# Patient Record
Sex: FEMALE | Race: WHITE | NOT HISPANIC OR LATINO | Employment: OTHER | URBAN - METROPOLITAN AREA
[De-identification: names, ages, dates, MRNs, and addresses within clinical notes are randomized per-mention and may not be internally consistent; named-entity substitution may affect disease eponyms.]

---

## 2017-01-02 ENCOUNTER — HOSPITAL ENCOUNTER (OUTPATIENT)
Dept: PHYSICAL THERAPY | Facility: HOSPITAL | Age: 67
Setting detail: THERAPIES SERIES
Discharge: HOME | End: 2017-01-02
Attending: ORTHOPAEDIC SURGERY

## 2017-01-02 DIAGNOSIS — S83.8X2S MENISCAL INJURY, LEFT, SEQUELA: ICD-10-CM

## 2017-01-02 DIAGNOSIS — Z98.890 S/P ARTHROSCOPY OF LEFT KNEE: Primary | ICD-10-CM

## 2017-01-02 PROCEDURE — 97110 THERAPEUTIC EXERCISES: CPT

## 2017-01-02 PROCEDURE — 97161 PT EVAL LOW COMPLEX 20 MIN: CPT

## 2017-01-02 PROCEDURE — G8979 MOBILITY GOAL STATUS: HCPCS

## 2017-01-02 PROCEDURE — G8978 MOBILITY CURRENT STATUS: HCPCS

## 2017-01-02 NOTE — PROGRESS NOTES
Outpatient Physical Therapy Ortho Initial Evaluation  [facility]     Patient Name: Hellen Delgado  : 1950  MRN: [id number]  Today's Date: 2017      Visit Date: 2017    Patient Active Problem List   Diagnosis   • Current tear of meniscus of knee   • S/P arthroscopy of knee        Past Medical History   Diagnosis Date   • Anxiety    • Arthritis    • Bronchitis    • Depression    • Depression    • Disease of thyroid gland      underactive thyroid   • GERD (gastroesophageal reflux disease)    • Hypercholesteremia    • Migraines    • Osteopenia    • PONV (postoperative nausea and vomiting)    • Seasonal allergies    • Sleep apnea      CPAP   • Vertigo         Past Surgical History   Procedure Laterality Date   • Back surgery  2008   • Knee arthroscopy  1988   • Thoracic spine surgery  2001   • Hysterectomy     • Gallbladder surgery     • Knee arthroscopy Left 2016     Procedure: LEFT KNEE ARTHROSCOPY AND PARTIAL MEDIAL AND LATERAL MENISECTOMIES; DEBRIDEMENT ARTHRITIS;  Surgeon: Starr Thomas MD;  Location: [facility] OR [facility];  Service:        Visit Dx:     ICD-10-CM ICD-9-CM   1. S/P arthroscopy of left knee Z98.890 V45.89   2. Meniscal injury, left, sequela S83.92XS 905.7             Patient History       17 1400          History    Chief Complaint Joint stiffness  -LS      Date Current Problem(s) Began 16  -LS      Brief Description of Current Complaint Pt reports in March she was cleaning on her hands and knees and tore R medial and lateral meniscus. She had R knee arthroscopic surgery years ago which is unsure exactly what was done but she thinks she tore something. She had L knee B menisectomy 16 performed by Dr. Thomas. She currently reports minimal pain unless she sits for long periods of time or twists.  -LS      Previous treatment for THIS PROBLEM Surgery  -LS      Onset Date- PT 17  -LS      Surgery Date: 16  -LS      Patient/Caregiver Goals  Relieve pain;Return to prior level of function;Improve mobility;Improve strength  -LS      Occupation/sports/leisure activities walking, playing with her granddaughter  -LS      Patient seeing anyone else for problem(s)? Yes  -LS      How has patient tried to help current problem? rest, ice  -LS      Surgery Date 1 12/13/16  -LS      Pain     Pain Location Knee  -LS      Pain at Present 1  -LS      Pain at Best 1  -LS      Pain at Worst 1  -LS      Pain Frequency Intermittent  -LS      Pain Description Sore;Sharp   generally sore, sharp with twisting  -LS      What Performance Factors Make the Current Problem(s) WORSE? twisting, standing for long periods of time  -LS      What Performance Factors Make the Current Problem(s) BETTER? ice, rest  -LS      Pain Comments I don't have hardly any pain unless I twist, or sit for a long period of time.  -LS      Tolerance Time- Standing no issue  -LS      Tolerance Time- Sitting increased pain after long period of time  -LS      Tolerance Time- Walking 4 hours  -LS      Tolerance Time- Lying no issue  -LS      Is your sleep disturbed? No  -LS      Is medication used to assist with sleep? No  -LS      What position do you sleep in? Right sidelying  -LS      Difficulties at work? pt is retired  -LS      Difficulties with ADL's? no issue  -LS      Difficulties with recreational activities? pt would like to return to normal walking for exercise  -LS      Fall Risk Assessment    Any falls in the past year: No  -LS      Services    Prior Rehab/Home Health Experiences No  -LS      Daily Activities    Primary Language English  -LS      Pt Participated in POC and Goals Yes  -LS      Safety    Are you being hurt, hit, or frightened by anyone at home or in your life? No  -LS      Are you being neglected by a caregiver No  -LS        User Key  (r) = Recorded By, (t) = Taken By, (c) = Cosigned By    Initials Name Provider Type    LS Marissa Montague PT Physical Therapist                PT  Ortho       01/02/17 1400    Subjective Pain    Able to rate subjective pain? yes  -LS    Pre-Treatment Pain Level 1  -LS    Post-Treatment Pain Level 1  -LS    Subjective Pain Comment It is sore sometimes and I have occasional sharp pain if I move a certain way but it is generally minimal.  -LS    Posture/Observations    Posture- WNL Posture is WNL  -LS    Sensation    Sensation WNL? WNL  -LS    DTR- Lower Quarter Clearing    Patellar tendon (L2-4) Left:;1- Minimal response;Right:;2- Normal response  -LS    Achilles tendon (S1-2) Left:;1- Minimal response;Right:;2- Normal response  -LS    Sensory Screen for Light Touch- Lower Quarter Clearing    L4 (medial lower leg/foot) Left:;Intact  -LS    L5 (lateral lower leg/great toe) Left:;Intact  -LS    S1 (bottom of foot) Left:;Intact  -LS    Myotomal Screen- Lower Quarter Clearing    Hip flexion (L2) Bilateral:;4- (Good -)  -LS    Knee extension (L3) Left:;4- (Good -);Right:;4+ (Good +)  -LS    Ankle DF (L4) Bilateral:;5 (Normal)  -LS    Ankle PF (S1) Bilateral:;5 (Normal)  -LS    Knee flexion (S2) Left:;4+ (Good +);Right:;5 (Normal)  -LS    Special Tests/Palpation    Special Tests/Palpation Knee  -LS    Knee Palpation    Patella --   non-tender  -LS    Patella Tendon --   non-tender  -LS    Medial Joint Line Left:;Tender  -LS    Lateral Joint Line Left:;Tender  -LS    Lateral Condyle --   non-tender  -LS    Medial Condyle --   non-tender  -LS    Knee Palpation? Yes  -LS    ROM (Range of Motion)    General ROM Detail L knee 0-106; R knee 0-110 degrees; soft end feel due to soft tissue approximation  -LS    MMT (Manual Muscle Testing)    General MMT Assessment Detail L knee flexion/extension decreaed compared to R; painful with resisted extension  -LS    Lower Extremity Flexibility    Hamstrings Bilateral:;WNL  -LS    Hip External Rotators Bilateral:;WNL  -LS    Hip Internal Rotators Bilateral:;WNL  -LS    Gastrocnemius Bilateral:;WNL  -LS    Gait Assessment/Treatment     Gait, Meeker Level independent  -LS    Gait, Distance (Feet) 150  -LS    Gait, Gait Deviations left:;decreased heel strike;weight-shifting ability decreased  -LS    Gait, Comment decreased TKE and heel strike L knee; corrected with cues  -LS    Stairs Assessment/Treatment    Number of Stairs 4  -LS    Stairs, Handrail Location left side (ascending)  -LS    Stairs, Meeker Level independent  -LS    Stairs, Technique Used step over step (ascending);step over step (descending)  -LS    Stairs, Comment slightly antalgic, especially descending, pt very cautious  -LS      User Key  (r) = Recorded By, (t) = Taken By, (c) = Cosigned By    Initials Name Provider Type    ANTHONY Montague PT Physical Therapist                            Therapy Education       01/02/17 1749    Therapy Education    Given HEP;Symptoms/condition management;Pain management  -LS    Program New  -LS    How Provided Verbal;Demonstration;Written  -LS    Provided to Patient  -LS    Level of Understanding Teach back education performed;Verbalized;Demonstrated  -LS      User Key  (r) = Recorded By, (t) = Taken By, (c) = Cosigned By    Initials Name Provider Type    ANTHONY Montague PT Physical Therapist                PT OP Goals       01/02/17 1700          PT Short Term Goals    STG Date to Achieve 01/16/17  -LS      STG 1 Pt will demonstrate understanding and compliance with initial HEP.  -LS      STG 1 Progress New  -LS      STG 2 Pt will report decreased incidence of stiffness with prolonged sitting and tolerance to 30-60 minutes of sitting without exacerbation.  -LS      STG 2 Progress New  -LS      STG 3 Pt will demonstrate equal step length, weight shift, and heel strike B during ambulation distances within clinic.   -LS      STG 3 Progress New  -LS      Long Term Goals    LTG Date to Achieve 02/01/17  -LS      LTG 1 Pt will demonstrate understanding and compliance with advanced HEP and independent exercise program.  -LS      LTG 1  Progress New  -LS      LTG 2 Pt will demonstrate improved overall function as evidenced by reduced KOS disability score from 17% to less than or equal to 5% disability.  -LS      LTG 2 Progress New  -LS      LTG 3 Pt will report ability to tolerate 30-45 minutes of independent ambulation at normal speed to allow her to return to prior level of function.  -LS      LTG 3 Progress New  -LS      Time Calculation    PT Goal Re-Cert Due Date 02/01/17  -LS        User Key  (r) = Recorded By, (t) = Taken By, (c) = Cosigned By    Initials Name Provider Type    LS Marissa Montague PT Physical Therapist                PT Assessment/Plan       01/02/17 2627          PT Assessment    Functional Limitations Impaired gait;Limitations in community activities;Performance in sport activities;Limitations in functional capacity and performance;Impaired locomotion;Performance in leisure activities  -      Impairments Balance;Gait;Pain;Muscle strength;Impaired reflex integrity;Joint mobility;Locomotion;Posture;Range of motion  -LS      Assessment Comments Pt is 66 year old female s/p L medial and lateral arthroscopic meniscectomy 12/13/16. She reports minimal pain but stiffness and occasional sharp pain with twisting movements and prolonged sitting. She demonstrates decreased LLE strength with 4-/5 extension (painful) and 4+/5 flexion. B hip flexion 4-/5 and B hip abduction 4/5. She demonstrates minimal ROM deficits L compared to R with R knee AROM 0-110 and L knee AROM 0-106 degrees. She demonstrates mildlly antalgic gait pattern with decreased R step length, decreased L heel strike and decreased L TKE. She demonstrates decreased L quadricep control and requires increased time and UE with descending stairs due to decreased quad eccentric control. Her patellar and [place]'s reflexes are decreased L compared to R. Light touch sensation is intact and equal B and pt flexibility is WFL B. She will benefit from continued skilled PT services  to improve LLE strength and stability and provide her with advanced HEP in order to continue to improve her tolerance to functional mobility and recreational exercise.   -LS      Please refer to paper survey for additional self-reported information Yes  -LS      Rehab Potential Good  -LS      Patient/caregiver participated in establishment of treatment plan and goals Yes  -LS      Patient would benefit from skilled therapy intervention Yes  -LS      PT Plan    PT Frequency 2x/week;1x/week  -LS      Predicted Duration of Therapy Intervention (days/wks) 4 weeks   -LS      Planned CPT's? PT EVAL: 30534;PT MANUAL THERAPY EA 15 MIN: 38772;PT RE-EVAL: 63964;PT NEUROMUSC RE-EDUCATION EA 15 MIN: 77701;PT THER PROC EA 15 MIN: 99163;PT THER ACT EA 15 MIN: 08816;PT GAIT TRAINING EA 15 MIN: 51278;PT HOT OR COLD PACK TREAT MCARE;PT HOT/COLD PACK WC NONMCARE: 21342  -LS      PT Plan Comments Initiate LLE strengthening and stability program with focus on education and HEP creation for transition to independent exercise.   -LS        User Key  (r) = Recorded By, (t) = Taken By, (c) = Cosigned By    Initials Name Provider Type    LS Marissa Montague, PT Physical Therapist                  Exercises       01/02/17 1400          Subjective Comments    Subjective Comments I am doing well, little pain. I just feel like it is weaker and gets stiff sometimes.  -LS      Subjective Pain    Able to rate subjective pain? yes  -LS      Pre-Treatment Pain Level 1  -LS      Post-Treatment Pain Level 1  -LS      Subjective Pain Comment It is sore sometimes and I have occasional sharp pain if I move a certain way but it is generally minimal.  -LS      Exercise 1    Exercise Name 1 quad set  -LS      Exercise 2    Exercise Name 2 LAQ  -LS      Exercise 3    Exercise Name 3 heel slide  -LS      Exercise 4    Exercise Name 4 standing abduction/extension  -LS        User Key  (r) = Recorded By, (t) = Taken By, (c) = Cosigned By    Initials Name Provider  Type    LS Marissa Montague PT Physical Therapist                              Outcome Measures       01/02/17 1400          Knee Outcome Score    Knee Outcome Score Comments 17% disability   66/80  -LS      Functional Assessment    Outcome Measure Options Knee Outcome Score- ADL  -LS        User Key  (r) = Recorded By, (t) = Taken By, (c) = Cosigned By    Initials Name Provider Type    LS Marissa Montague PT Physical Therapist            Time Calculation:   Start Time: 1400  Stop Time: 1500  Time Calculation (min): 60 min     Therapy Charges for Today     Code Description Service Date Service Provider Modifiers Qty    70797919919 HC PT MOBILITY CURRENT 1/2/2017 Marissa Montague, PT GP, CI 1    39449031942 HC PT MOBILITY PROJECTED 1/2/2017 Marissa Montague, PT GP, CH 1    51022087879 HC PT EVAL LOW COMPLEXITY 3 1/2/2017 Marissa Montague, PT GP 1    30972329729 HC PT THER PROC EA 15 MIN 1/2/2017 Marissa Montague, PT GP 1          PT G-Codes  PT Professional Judgement Used?: Yes  Outcome Measure Options: Knee Outcome Score- ADL  Score: 17%  Functional Limitation: Mobility: Walking and moving around  Mobility: Walking and Moving Around Current Status (): At least 1 percent but less than 20 percent impaired, limited or restricted  Mobility: Walking and Moving Around Goal Status (): 0 percent impaired, limited or restricted         Marissa Montague PT  1/2/2017

## 2017-01-05 ENCOUNTER — HOSPITAL ENCOUNTER (OUTPATIENT)
Dept: PHYSICAL THERAPY | Facility: HOSPITAL | Age: 67
Setting detail: THERAPIES SERIES
Discharge: HOME | End: 2017-01-05

## 2017-01-05 DIAGNOSIS — Z98.890 S/P ARTHROSCOPY OF LEFT KNEE: Primary | ICD-10-CM

## 2017-01-05 DIAGNOSIS — S83.8X2S MENISCAL INJURY, LEFT, SEQUELA: ICD-10-CM

## 2017-01-05 PROCEDURE — 97110 THERAPEUTIC EXERCISES: CPT

## 2017-01-05 NOTE — PROGRESS NOTES
Outpatient Physical Therapy Ortho Treatment Note  [facility]     Patient Name: Hellen Delgado  : 1950  MRN: [id number]  Today's Date: 2017      Visit Date: 2017    Visit Dx:    ICD-10-CM ICD-9-CM   1. S/P arthroscopy of left knee Z98.890 V45.89   2. Meniscal injury, left, sequela S83.92XS 905.7       Patient Active Problem List   Diagnosis   • Current tear of meniscus of knee   • S/P arthroscopy of knee        Past Medical History   Diagnosis Date   • Anxiety    • Arthritis    • Bronchitis    • Depression    • Depression    • Disease of thyroid gland      underactive thyroid   • GERD (gastroesophageal reflux disease)    • Hypercholesteremia    • Migraines    • Osteopenia    • PONV (postoperative nausea and vomiting)    • Seasonal allergies    • Sleep apnea      CPAP   • Vertigo         Past Surgical History   Procedure Laterality Date   • Back surgery  2008   • Knee arthroscopy  1988   • Thoracic spine surgery  2001   • Hysterectomy     • Gallbladder surgery     • Knee arthroscopy Left 2016     Procedure: LEFT KNEE ARTHROSCOPY AND PARTIAL MEDIAL AND LATERAL MENISECTOMIES; DEBRIDEMENT ARTHRITIS;  Surgeon: Starr Thomas MD;  [facility] [place] [facility];  Service:              PT Ortho       17 1400    Subjective Pain    Able to rate subjective pain? yes  -LS    Pre-Treatment Pain Level 1  -LS    Post-Treatment Pain Level 1  -LS    Subjective Pain Comment It is sore sometimes and I have occasional sharp pain if I move a certain way but it is generally minimal.  -LS    Posture/Observations    Posture- WNL Posture is WNL  -LS    Sensation    Sensation WNL? WNL  -LS    DTR- Lower Quarter Clearing    Patellar tendon (L2-4) Left:;1- Minimal response;Right:;2- Normal response  -LS    Achilles tendon (S1-2) Left:;1- Minimal response;Right:;2- Normal response  -LS    Sensory Screen for Light Touch- Lower Quarter Clearing    L4 (medial lower leg/foot) Left:;Intact  -LS    L5  (lateral lower leg/great toe) Left:;Intact  -LS    S1 (bottom of foot) Left:;Intact  -LS    Myotomal Screen- Lower Quarter Clearing    Hip flexion (L2) Bilateral:;4- (Good -)  -LS    Knee extension (L3) Left:;4- (Good -);Right:;4+ (Good +)  -LS    Ankle DF (L4) Bilateral:;5 (Normal)  -LS    Ankle PF (S1) Bilateral:;5 (Normal)  -LS    Knee flexion (S2) Left:;4+ (Good +);Right:;5 (Normal)  -LS    Special Tests/Palpation    Special Tests/Palpation Knee  -LS    Knee Palpation    Patella --   non-tender  -LS    Patella Tendon --   non-tender  -LS    Medial Joint Line Left:;Tender  -LS    Lateral Joint Line Left:;Tender  -LS    Lateral Condyle --   non-tender  -LS    Medial Condyle --   non-tender  -LS    Knee Palpation? Yes  -LS    ROM (Range of Motion)    General ROM Detail L knee 0-106; R knee 0-110 degrees; soft end feel due to soft tissue approximation  -LS    MMT (Manual Muscle Testing)    General MMT Assessment Detail L knee flexion/extension decreaed compared to R; painful with resisted extension  -LS    Lower Extremity Flexibility    Hamstrings Bilateral:;WNL  -LS    Hip External Rotators Bilateral:;WNL  -LS    Hip Internal Rotators Bilateral:;WNL  -LS    Gastrocnemius Bilateral:;WNL  -LS    Gait Assessment/Treatment    Gait, [place] Level independent  -    Gait, Distance (Feet) 150  -LS    Gait, Gait Deviations left:;decreased heel strike;weight-shifting ability decreased  -    Gait, Comment decreased TKE and heel strike L knee; corrected with cues  -LS    Stairs Assessment/Treatment    Number of Stairs 4  -LS    Stairs, Handrail Location left side (ascending)  -    Stairs, [place] Level independent  -    Stairs, Technique Used step over step (ascending);step over step (descending)  -    Stairs, Comment slightly antalgic, especially descending, pt very cautious  -      User Key  (r) = Recorded By, (t) = Taken By, (c) = Cosigned By    Initials Name Provider Type    ANTHONY Montague, PT  Physical Therapist                            PT Assessment/Plan       01/05/17 1012 01/02/17 7326       PT Assessment    Functional Limitations  Impaired gait;Limitations in community activities;Performance in sport activities;Limitations in functional capacity and performance;Impaired locomotion;Performance in leisure activities  -LS     Impairments  Balance;Gait;Pain;Muscle strength;Impaired reflex integrity;Joint mobility;Locomotion;Posture;Range of motion  -LS     Assessment Comments Patient reports no pain in left knee. Mild tenderness to palpation pes-anserine. Added exercises to home program.   -WS Pt is 66 year old female s/p L medial and lateral arthroscopic meniscectomy 12/13/16. She reports minimal pain but stiffness and occasional sharp pain with twisting movements and prolonged sitting. She demonstrates decreased LLE strength with 4-/5 extension (painful) and 4+/5 flexion. B hip flexion 4-/5 and B hip abduction 4/5. She demonstrates minimal ROM deficits L compared to R with R knee AROM 0-110 and L knee AROM 0-106 degrees. She demonstrates mildlly antalgic gait pattern with decreased R step length, decreased L heel strike and decreased L TKE. She demonstrates decreased L quadricep control and requires increased time and UE with descending stairs due to decreased quad eccentric control. Her patellar and [place]'s reflexes are decreased L compared to R. Light touch sensation is intact and equal B and pt flexibility is WFL B. She will benefit from continued skilled PT services to improve LLE strength and stability and provide her with advanced HEP in order to continue to improve her tolerance to functional mobility and recreational exercise.   -LS     Please refer to paper survey for additional self-reported information  Yes  -LS     Rehab Potential  Good  -LS     Patient/caregiver participated in establishment of treatment plan and goals  Yes  -LS     Patient would benefit from skilled therapy  intervention  Yes  -LS     PT Plan    PT Frequency  2x/week;1x/week  -LS     Predicted Duration of Therapy Intervention (days/wks)  4 weeks   -LS     Planned CPT's?  PT EVAL: 67668;PT MANUAL THERAPY EA 15 MIN: 23992;PT RE-EVAL: 95276;PT NEUROMUSC RE-EDUCATION EA 15 MIN: 11968;PT THER PROC EA 15 MIN: 75735;PT THER ACT EA 15 MIN: 72499;PT GAIT TRAINING EA 15 MIN: 34025;PT HOT OR COLD PACK TREAT MCARE;PT HOT/COLD PACK WC NONMCARE: 57786  -LS     PT Plan Comments  Initiate LLE strengthening and stability program with focus on education and HEP creation for transition to independent exercise.   -LS       User Key  (r) = Recorded By, (t) = Taken By, (c) = Cosigned By    Initials Name Provider Type    MALLORIE Mosley PTA Physical Therapy Assistant    LS Marissa Montague, PT Physical Therapist                Modalities       01/05/17 0955          Ice    Patient denies application of Ice Yes  -        User Key  (r) = Recorded By, (t) = Taken By, (c) = Cosigned By    Initials Name Provider Type    MALLORIE Mosley PTA Physical Therapy Assistant                Exercises       01/05/17 0955          Subjective Comments    Subjective Comments No pain in left knee. No problems with exercises.   -WS      Subjective Pain    Able to rate subjective pain? yes  -WS      Pre-Treatment Pain Level 0  -WS      Exercise 1    Exercise Name 1 quad set  -WS      Reps 1 15  -WS      Exercise 2    Exercise Name 2 LAQ  -WS      Weights/Plates 2 2  -WS      Reps 2 15  -WS      Exercise 3    Exercise Name 3 heel slide  -WS      Reps 3 15  -WS      Exercise 4    Exercise Name 4 standing abduction/extension  -WS      Weights/Plates 4 2  -WS      Reps 4 15  -WS      Exercise 5    Exercise Name 5 calf raise  -WS      Reps 5 15  -WS      Exercise 6    Exercise Name 6 Hamstring curl  -WS      Weights/Plates 6 2  -WS      Reps 6 15  -WS      Exercise 7    Exercise Name 7 SAQ  -WS      Weights/Plates 7 2  -WS      Reps 7 15  -WS      Exercise 8     Exercise Name 8 Nu-step  -WS      Resistance 8 --   L4  -WS      Time (Minutes) 8 5  -WS        User Key  (r) = Recorded By, (t) = Taken By, (c) = Cosigned By    Initials Name Provider Type    MALLORIE Mosley PTA Physical Therapy Assistant                               PT OP Goals       01/05/17 1000 01/02/17 1700       PT Short Term Goals    STG Date to Achieve 01/16/17  -WS 01/16/17  -LS     STG 1 Pt will demonstrate understanding and compliance with initial HEP.  -WS Pt will demonstrate understanding and compliance with initial HEP.  -LS     STG 1 Progress Partially Met  -WS New  -LS     STG 2 Pt will report decreased incidence of stiffness with prolonged sitting and tolerance to 30-60 minutes of sitting without exacerbation.  -WS Pt will report decreased incidence of stiffness with prolonged sitting and tolerance to 30-60 minutes of sitting without exacerbation.  -LS     STG 2 Progress Ongoing  -WS New  -LS     STG 3 Pt will demonstrate equal step length, weight shift, and heel strike B during ambulation distances within clinic.   -WS Pt will demonstrate equal step length, weight shift, and heel strike B during ambulation distances within clinic.   -LS     STG 3 Progress Ongoing  -WS New  -LS     Long Term Goals    LTG Date to Achieve 02/01/17  -WS 02/01/17  -LS     LTG 1 Pt will demonstrate understanding and compliance with advanced HEP and independent exercise program.  -WS Pt will demonstrate understanding and compliance with advanced HEP and independent exercise program.  -LS     LTG 1 Progress Ongoing  -WS New  -LS     LTG 2 Pt will demonstrate improved overall function as evidenced by reduced KOS disability score from 17% to less than or equal to 5% disability.  -WS Pt will demonstrate improved overall function as evidenced by reduced KOS disability score from 17% to less than or equal to 5% disability.  -LS     LTG 2 Progress Ongoing  -WS New  -LS     LTG 3 Pt will report ability to tolerate 30-45  minutes of independent ambulation at normal speed to allow her to return to prior level of function.  -WS Pt will report ability to tolerate 30-45 minutes of independent ambulation at normal speed to allow her to return to prior level of function.  -LS     LTG 3 Progress Ongoing  -WS New  -LS     Time Calculation    PT Goal Re-Cert Due Date  02/01/17  -LS       User Key  (r) = Recorded By, (t) = Taken By, (c) = Cosigned By    Initials Name Provider Type    MALLORIE Mosley PTA Physical Therapy Assistant    ANTHONY Montague PT Physical Therapist                Therapy Education       01/05/17 1011    Therapy Education    Given HEP;Symptoms/condition management  -WS    Program Reinforced  -WS    How Provided Verbal  -WS    Provided to Patient  -WS      01/02/17 1749    Therapy Education    Given HEP;Symptoms/condition management;Pain management  -LS    Program New  -LS    How Provided Verbal;Demonstration;Written  -LS    Provided to Patient  -LS    Level of Understanding Teach back education performed;Verbalized;Demonstrated  -LS      User Key  (r) = Recorded By, (t) = Taken By, (c) = Cosigned By    Initials Name Provider Type    MALLORIE Mosley PTA Physical Therapy Assistant    ANTHONY Montague PT Physical Therapist                Outcome Measures       01/02/17 1400          Knee Outcome Score    Knee Outcome Score Comments 17% disability   66/80  -LS      Functional Assessment    Outcome Measure Options Knee Outcome Score- ADL  -LS        User Key  (r) = Recorded By, (t) = Taken By, (c) = Cosigned By    Initials Name Provider Type    ANTHONY Montague PT Physical Therapist            Time Calculation:   Start Time: 0955  Stop Time: 1023  Time Calculation (min): 28 min    Therapy Charges for Today     Code Description Service Date Service Provider Modifiers Qty    [id number]  PT THER PROC EA 15 MIN 1/5/2017 Shahid Mosley PTA GP 2                    Shahid Mosley PTA  1/5/2017

## 2017-01-09 ENCOUNTER — HOSPITAL ENCOUNTER (OUTPATIENT)
Dept: PHYSICAL THERAPY | Facility: HOSPITAL | Age: 67
Setting detail: THERAPIES SERIES
Discharge: HOME | End: 2017-01-09

## 2017-01-09 DIAGNOSIS — Z98.890 S/P ARTHROSCOPY OF LEFT KNEE: Primary | ICD-10-CM

## 2017-01-09 DIAGNOSIS — S83.8X2S MENISCAL INJURY, LEFT, SEQUELA: ICD-10-CM

## 2017-01-09 PROCEDURE — 97110 THERAPEUTIC EXERCISES: CPT

## 2017-01-09 NOTE — PROGRESS NOTES
Outpatient Physical Therapy Ortho Treatment Note  [facility]     Patient Name: Hellen Delgado  : 1950  MRN: [id number]  Today's Date: 2017      Visit Date: 2017    Visit Dx:    ICD-10-CM ICD-9-CM   1. S/P arthroscopy of left knee Z98.890 V45.89   2. Meniscal injury, left, sequela S83.92XS 905.7       Patient Active Problem List   Diagnosis   • Current tear of meniscus of knee   • S/P arthroscopy of knee        Past Medical History   Diagnosis Date   • Anxiety    • Arthritis    • Bronchitis    • Depression    • Depression    • Disease of thyroid gland      underactive thyroid   • GERD (gastroesophageal reflux disease)    • Hypercholesteremia    • Migraines    • Osteopenia    • PONV (postoperative nausea and vomiting)    • Seasonal allergies    • Sleep apnea      CPAP   • Vertigo         Past Surgical History   Procedure Laterality Date   • Back surgery  2008   • Knee arthroscopy  1988   • Thoracic spine surgery  2001   • Hysterectomy     • Gallbladder surgery     • Knee arthroscopy Left 2016     Procedure: LEFT KNEE ARTHROSCOPY AND PARTIAL MEDIAL AND LATERAL MENISECTOMIES; DEBRIDEMENT ARTHRITIS;  Surgeon: Starr Thomas MD;  [place] [facility] [place] [facility] [place]  [place]:                              PT Assessment/Plan       17 1210 17 1012 17 1755    PT Assessment    Functional Limitations   Impaired gait;Limitations in community activities;Performance in sport activities;Limitations in functional capacity and performance;Impaired locomotion;Performance in leisure activities  -LS    Impairments   Balance;Gait;Pain;Muscle strength;Impaired reflex integrity;Joint mobility;Locomotion;Posture;Range of motion  -LS    Assessment Comments Able to walk up/down stairs with reciprocal gait. No pain or tenderness in left knee. Will aquire ankle weights. Next visit new KOS.   -MALLORIE Patient reports no pain in left knee. Mild tenderness to palpation pes-anserine. Added exercises to  home program.   -WS Pt is 66 year old female s/p L medial and lateral arthroscopic meniscectomy 12/13/16. She reports minimal pain but stiffness and occasional sharp pain with twisting movements and prolonged sitting. She demonstrates decreased LLE strength with 4-/5 extension (painful) and 4+/5 flexion. B hip flexion 4-/5 and B hip abduction 4/5. She demonstrates minimal ROM deficits L compared to R with R knee AROM 0-110 and L knee AROM 0-106 degrees. She demonstrates mildlly antalgic gait pattern with decreased R step length, decreased L heel strike and decreased L TKE. She demonstrates decreased L quadricep control and requires increased time and UE with descending stairs due to decreased quad eccentric control. Her patellar and [place]'s reflexes are decreased L compared to R. Light touch sensation is intact and equal B and pt flexibility is WFL B. She will benefit from continued skilled PT services to improve LLE strength and stability and provide her with advanced HEP in order to continue to improve her tolerance to functional mobility and recreational exercise.   -LS    Please refer to paper survey for additional self-reported information   Yes  -LS    Rehab Potential   Good  -LS    Patient/caregiver participated in establishment of treatment plan and goals   Yes  -LS    Patient would benefit from skilled therapy intervention   Yes  -LS    PT Plan    PT Frequency   2x/week;1x/week  -LS    Predicted Duration of Therapy Intervention (days/wks)   4 weeks   -LS    Planned CPT's?   PT EVAL: 14203;PT MANUAL THERAPY EA 15 MIN: 31389;PT RE-EVAL: 12374;PT NEUROMUSC RE-EDUCATION EA 15 MIN: 80386;PT THER PROC EA 15 MIN: 12791;PT THER ACT EA 15 MIN: 51649;PT GAIT TRAINING EA 15 MIN: 02301;PT HOT OR COLD PACK TREAT MCARE;PT HOT/COLD PACK WC NONMCARE: 60846  -LS    PT Plan Comments   Initiate LLE strengthening and stability program with focus on education and HEP creation for transition to independent exercise.   -LS       User Key  (r) = Recorded By, (t) = Taken By, (c) = Cosigned By    Initials Name Provider Type    MALLORIE Mosley PTA Physical Therapy Assistant    LS Marissa Montague, PT Physical Therapist                Modalities       01/09/17 1145          Ice    Patient denies application of Ice Yes  -WS        User Key  (r) = Recorded By, (t) = Taken By, (c) = Cosigned By    Initials Name Provider Type    MALLORIE Mosley PTA Physical Therapy Assistant                Exercises       01/09/17 1145          Subjective Comments    Subjective Comments No pain in knee. Did well with last treatment.   -WS      Subjective Pain    Able to rate subjective pain? yes  -WS      Pre-Treatment Pain Level 0  -WS      Exercise 1    Exercise Name 1 quad set  -WS      Reps 1 15  -WS      Exercise 2    Exercise Name 2 LAQ  -WS      Weights/Plates 2 3  -WS      Reps 2 15  -WS      Exercise 3    Exercise Name 3 heel slide  -WS      Reps 3 15  -WS      Exercise 4    Exercise Name 4 standing abduction/extension  -WS      Weights/Plates 4 3  -WS      Reps 4 15  -WS      Exercise 5    Exercise Name 5 calf raise  -WS      Reps 5 15  -WS      Exercise 6    Exercise Name 6 Hamstring curl  -WS      Weights/Plates 6 3  -WS      Reps 6 15  -WS      Exercise 7    Exercise Name 7 SAQ  -WS      Weights/Plates 7 3  -WS      Reps 7 15  -WS      Exercise 8    Exercise Name 8 Nu-step  -WS      Resistance 8 --   L4  -WS      Time (Minutes) 8 7  -WS        User Key  (r) = Recorded By, (t) = Taken By, (c) = Cosigned By    Initials Name Provider Type    MALLORIE Mosley PTA Physical Therapy Assistant                               PT OP Goals       01/09/17 1200 01/05/17 1000 01/02/17 1700    PT Short Term Goals    STG Date to Achieve 01/16/17  -WS 01/16/17  -WS 01/16/17  -    STG 1 Pt will demonstrate understanding and compliance with initial HEP.  -WS Pt will demonstrate understanding and compliance with initial HEP.  -WS Pt will demonstrate  understanding and compliance with initial HEP.  -LS    STG 1 Progress Met  -WS Partially Met  -WS New  -LS    STG 2 Pt will report decreased incidence of stiffness with prolonged sitting and tolerance to 30-60 minutes of sitting without exacerbation.  -WS Pt will report decreased incidence of stiffness with prolonged sitting and tolerance to 30-60 minutes of sitting without exacerbation.  -WS Pt will report decreased incidence of stiffness with prolonged sitting and tolerance to 30-60 minutes of sitting without exacerbation.  -LS    STG 2 Progress Met  -WS Ongoing  -WS New  -LS    STG 3 Pt will demonstrate equal step length, weight shift, and heel strike B during ambulation distances within clinic.   -WS Pt will demonstrate equal step length, weight shift, and heel strike B during ambulation distances within clinic.   -WS Pt will demonstrate equal step length, weight shift, and heel strike B during ambulation distances within clinic.   -LS    STG 3 Progress Met  -WS Ongoing  -WS New  -LS    Long Term Goals    LTG Date to Achieve 02/01/17  -WS 02/01/17  -WS 02/01/17  -LS    LTG 1 Pt will demonstrate understanding and compliance with advanced HEP and independent exercise program.  -WS Pt will demonstrate understanding and compliance with advanced HEP and independent exercise program.  -WS Pt will demonstrate understanding and compliance with advanced HEP and independent exercise program.  -LS    LTG 1 Progress Ongoing  -WS Ongoing  -WS New  -LS    LTG 2 Pt will demonstrate improved overall function as evidenced by reduced KOS disability score from 17% to less than or equal to 5% disability.  -WS Pt will demonstrate improved overall function as evidenced by reduced KOS disability score from 17% to less than or equal to 5% disability.  -WS Pt will demonstrate improved overall function as evidenced by reduced KOS disability score from 17% to less than or equal to 5% disability.  -LS    LTG 2 Progress Ongoing  -WS Ongoing   -WS New  -LS    LTG 3 Pt will report ability to tolerate 30-45 minutes of independent ambulation at normal speed to allow her to return to prior level of function.  -WS Pt will report ability to tolerate 30-45 minutes of independent ambulation at normal speed to allow her to return to prior level of function.  -WS Pt will report ability to tolerate 30-45 minutes of independent ambulation at normal speed to allow her to return to prior level of function.  -LS    LTG 3 Progress Met  -WS Ongoing  -WS New  -LS    Time Calculation    PT Goal Re-Cert Due Date   02/01/17  -LS      User Key  (r) = Recorded By, (t) = Taken By, (c) = Cosigned By    Initials Name Provider Type    MALLORIE Mosley PTA Physical Therapy Assistant    ANTHONY Montague, JENS Physical Therapist                Therapy Education       01/09/17 1202    Therapy Education    Given HEP;Symptoms/condition management  -WS    Program Reinforced  -WS    How Provided Verbal  -WS    Provided to Patient  -WS      01/05/17 1011    Therapy Education    Given HEP;Symptoms/condition management  -WS    Program Reinforced  -WS    How Provided Verbal  -WS    Provided to Patient  -WS      01/02/17 1749    Therapy Education    Given HEP;Symptoms/condition management;Pain management  -LS    Program New  -LS    How Provided Verbal;Demonstration;Written  -LS    Provided to Patient  -LS    Level of Understanding Teach back education performed;Verbalized;Demonstrated  -LS      User Key  (r) = Recorded By, (t) = Taken By, (c) = Cosigned By    Initials Name Provider Type    MALLORIE Mosley PTA Physical Therapy Assistant    ANTHONY Montague, JENS Physical Therapist                Time Calculation:   Start Time: 1145  Stop Time: 1210  Time Calculation (min): 25 min    Therapy Charges for Today     Code Description Service Date Service Provider Modifiers Qty    [id number]  PT THER PROC EA 15 MIN 1/9/2017 Shahid Mosley PTA GP 2                    Shahid Mosley  PTA  1/9/2017

## 2017-01-12 ENCOUNTER — APPOINTMENT (OUTPATIENT)
Dept: PHYSICAL THERAPY | Facility: HOSPITAL | Age: 67
End: 2017-01-12

## 2017-01-16 ENCOUNTER — HOSPITAL ENCOUNTER (OUTPATIENT)
Dept: PHYSICAL THERAPY | Facility: HOSPITAL | Age: 67
Setting detail: THERAPIES SERIES
Discharge: HOME | End: 2017-01-16

## 2017-01-16 DIAGNOSIS — S83.8X2S MENISCAL INJURY, LEFT, SEQUELA: ICD-10-CM

## 2017-01-16 DIAGNOSIS — Z98.890 S/P ARTHROSCOPY OF LEFT KNEE: Primary | ICD-10-CM

## 2017-01-16 PROCEDURE — 97110 THERAPEUTIC EXERCISES: CPT

## 2017-01-16 NOTE — PROGRESS NOTES
Outpatient Physical Therapy Ortho Treatment Note  [facility]     Patient Name: Hellen Delgado  : 1950  MRN: [id number]  Today's Date: 2017      Visit Date: 2017    Visit Dx:    ICD-10-CM ICD-9-CM   1. S/P arthroscopy of left knee Z98.890 V45.89   2. Meniscal injury, left, sequela S83.92XS 905.7       Patient Active Problem List   Diagnosis   • Current tear of meniscus of knee   • S/P arthroscopy of knee        Past Medical History   Diagnosis Date   • Anxiety    • Arthritis    • Bronchitis    • Depression    • Depression    • Disease of thyroid gland      underactive thyroid   • GERD (gastroesophageal reflux disease)    • Hypercholesteremia    • Migraines    • Osteopenia    • PONV (postoperative nausea and vomiting)    • Seasonal allergies    • Sleep apnea      CPAP   • Vertigo         Past Surgical History   Procedure Laterality Date   • Back surgery  2008   • Knee arthroscopy  1988   • Thoracic spine surgery  2001   • Hysterectomy     • Gallbladder surgery     • Knee arthroscopy Left 2016     Procedure: LEFT KNEE ARTHROSCOPY AND PARTIAL MEDIAL AND LATERAL MENISECTOMIES; DEBRIDEMENT ARTHRITIS;  Surgeon: Starr Thomas MD;  Location: [facility] [place] [facility] [place];  Service:                              PT Assessment/Plan       17 1217          PT Assessment    Assessment Comments Patient is independent with progressive HEP and symptom management. Patient is discharged to continue work on strengthening.   -        User Key  (r) = Recorded By, (t) = Taken By, (c) = Cosigned By    Initials Name Provider Type    MALLORIE Mosley PTA Physical Therapy Assistant                Modalities       17 1100          Ice    Patient denies application of Ice Yes  -        User Key  (r) = Recorded By, (t) = Taken By, (c) = Cosigned By    Initials Name Provider Type    MALLORIE Mosley PTA Physical Therapy Assistant                Exercises       17 1100 17 1045        Subjective Comments    Subjective Comments  No pain.   -WS     Subjective Pain    Able to rate subjective pain?  yes  -WS     Pre-Treatment Pain Level  0  -WS     Exercise 1    Exercise Name 1 quad set  -WS      Reps 1 15  -WS      Exercise 2    Exercise Name 2 LAQ  -WS      Weights/Plates 2 4  -WS      Reps 2 15  -WS      Exercise 3    Exercise Name 3 heel slide  -WS      Reps 3 15  -WS      Exercise 4    Exercise Name 4 standing abduction/extension  -WS      Weights/Plates 4 4  -WS      Reps 4 15  -WS      Exercise 5    Exercise Name 5 calf raise  -WS      Reps 5 15  -WS      Exercise 6    Exercise Name 6 Hamstring curl  -WS      Weights/Plates 6 4  -WS      Reps 6 15  -WS      Exercise 7    Exercise Name 7 SAQ  -WS      Weights/Plates 7 4  -WS      Reps 7 15  -WS      Exercise 8    Exercise Name 8 Nu-step  -WS      Resistance 8 --   L4  -WS      Time (Minutes) 8 7  -WS        User Key  (r) = Recorded By, (t) = Taken By, (c) = Cosigned By    Initials Name Provider Type    MALLORIE Mosley PTA Physical Therapy Assistant                               PT OP Goals       01/16/17 1200 01/16/17 1100 01/09/17 1200    PT Short Term Goals    STG Date to Achieve 01/16/17  -WS 01/16/17  -WS 01/16/17  -WS    STG 1 Pt will demonstrate understanding and compliance with initial HEP.  -WS Pt will demonstrate understanding and compliance with initial HEP.  -WS Pt will demonstrate understanding and compliance with initial HEP.  -WS    STG 1 Progress Met  -WS Met  -WS Met  -WS    STG 2 Pt will report decreased incidence of stiffness with prolonged sitting and tolerance to 30-60 minutes of sitting without exacerbation.  -WS Pt will report decreased incidence of stiffness with prolonged sitting and tolerance to 30-60 minutes of sitting without exacerbation.  -WS Pt will report decreased incidence of stiffness with prolonged sitting and tolerance to 30-60 minutes of sitting without exacerbation.  -    STG 2 Progress Met  - Met   -WS Met  -WS    STG 3 Pt will demonstrate equal step length, weight shift, and heel strike B during ambulation distances within clinic.   -WS Pt will demonstrate equal step length, weight shift, and heel strike B during ambulation distances within clinic.   -WS Pt will demonstrate equal step length, weight shift, and heel strike B during ambulation distances within clinic.   -WS    STG 3 Progress Met  -WS Met  -WS Met  -WS    Long Term Goals    LTG Date to Achieve 02/01/17  -WS 02/01/17  -WS 02/01/17  -WS    LTG 1 Pt will demonstrate understanding and compliance with advanced HEP and independent exercise program.  -WS Pt will demonstrate understanding and compliance with advanced HEP and independent exercise program.  -WS Pt will demonstrate understanding and compliance with advanced HEP and independent exercise program.  -WS    LTG 1 Progress Met  -WS Ongoing  -WS Ongoing  -WS    LTG 2 Pt will demonstrate improved overall function as evidenced by reduced KOS disability score from 17% to less than or equal to 5% disability.  -WS Pt will demonstrate improved overall function as evidenced by reduced KOS disability score from 17% to less than or equal to 5% disability.  -WS Pt will demonstrate improved overall function as evidenced by reduced KOS disability score from 17% to less than or equal to 5% disability.  -WS    LTG 2 Progress Not Met  -WS Not Met  -WS Ongoing  -WS    LTG 3 Pt will report ability to tolerate 30-45 minutes of independent ambulation at normal speed to allow her to return to prior level of function.  -WS Pt will report ability to tolerate 30-45 minutes of independent ambulation at normal speed to allow her to return to prior level of function.  -WS Pt will report ability to tolerate 30-45 minutes of independent ambulation at normal speed to allow her to return to prior level of function.  -WS    LTG 3 Progress Met  -WS Met  -WS Met  -WS      01/05/17 1000          PT Short Term Goals    STG Date to  Achieve 01/16/17  -WS      STG 1 Pt will demonstrate understanding and compliance with initial HEP.  -WS      STG 1 Progress Partially Met  -WS      STG 2 Pt will report decreased incidence of stiffness with prolonged sitting and tolerance to 30-60 minutes of sitting without exacerbation.  -WS      STG 2 Progress Ongoing  -WS      STG 3 Pt will demonstrate equal step length, weight shift, and heel strike B during ambulation distances within clinic.   -WS      STG 3 Progress Ongoing  -WS      Long Term Goals    LTG Date to Achieve 02/01/17  -WS      LTG 1 Pt will demonstrate understanding and compliance with advanced HEP and independent exercise program.  -WS      LTG 1 Progress Ongoing  -WS      LTG 2 Pt will demonstrate improved overall function as evidenced by reduced KOS disability score from 17% to less than or equal to 5% disability.  -WS      LTG 2 Progress Ongoing  -WS      LTG 3 Pt will report ability to tolerate 30-45 minutes of independent ambulation at normal speed to allow her to return to prior level of function.  -WS      LTG 3 Progress Ongoing  -WS        User Key  (r) = Recorded By, (t) = Taken By, (c) = Cosigned By    Initials Name Provider Type    MALLORIE Mosley PTA Physical Therapy Assistant                Therapy Education       01/16/17 1217    Therapy Education    Given HEP;Symptoms/condition management  -WS    Program Reinforced  -WS    How Provided Verbal  -WS    Provided to Patient  -WS      User Key  (r) = Recorded By, (t) = Taken By, (c) = Cosigned By    Initials Name Provider Type    MALLORIE Mosley PTA Physical Therapy Assistant                Time Calculation:   Start Time: 1050  Stop Time: 1120  Time Calculation (min): 30 min    Therapy Charges for Today     Code Description Service Date Service Provider Modifiers Qty    [id number] HC PT THER PROC EA 15 MIN 1/16/2017 Shahid Mosley PTA GP 2                    Shahid Mosley PTA  1/16/2017

## 2017-01-19 ENCOUNTER — OFFICE VISIT (OUTPATIENT)
Dept: ORTHOPEDIC SURGERY | Facility: CLINIC | Age: 67
End: 2017-01-19

## 2017-01-19 VITALS — WEIGHT: 210 LBS | BODY MASS INDEX: 34.99 KG/M2 | TEMPERATURE: 98.3 F | HEIGHT: 65 IN

## 2017-01-19 DIAGNOSIS — Z98.890 S/P ARTHROSCOPY OF KNEE: Primary | ICD-10-CM

## 2017-01-19 PROCEDURE — 99024 POSTOP FOLLOW-UP VISIT: CPT | Performed by: ORTHOPAEDIC SURGERY

## 2017-01-19 NOTE — MR AVS SNAPSHOT
Hellen Delgado   1/19/2017 10:45 AM   Office Visit    Dept Phone:  [phone]   Encounter #:  [id number]    Provider:  Starr Thomas MD   Department:  [facility]                Your Full Care Plan              Your Updated Medication List          This list is accurate as of: 1/19/17 11:25 AM.  Always use your most recent med list.                ADVIL 200 MG tablet   Generic drug:  ibuprofen       atorvastatin 40 MG tablet   Commonly known as:  LIPITOR       azithromycin 250 MG tablet   Commonly known as:  ZITHROMAX       conjugated estrogens 0.625 MG/GM vaginal cream   Commonly known as:  PREMARIN       fexofenadine 180 MG tablet   Commonly known as:  ALLEGRA       fluticasone-salmeterol 100-50 MCG/DOSE DISKUS   Commonly known as:  ADVAIR       HYDROcodone-acetaminophen 7.5-325 MG per tablet   Commonly known as:  NORCO   1-2 Oral Q4H to Q6H PRN severe pain       lansoprazole 30 MG capsule   Commonly known as:  PREVACID       levothyroxine 137 MCG tablet   Commonly known as:  SYNTHROID, LEVOTHROID       liothyronine 25 MCG tablet   Commonly known as:  CYTOMEL       meclizine 25 MG chewable tablet chewable tablet       mometasone 50 MCG/ACT nasal spray   Commonly known as:  NASONEX       * MULTIVITAMIN ADULT PO       * VITAMIN D3 COMPLETE PO       naproxen sodium 220 MG tablet   Commonly known as:  ALEVE       predniSONE 10 MG tablet   Commonly known as:  DELTASONE       PROAIR  (90 BASE) MCG/ACT inhaler   Generic drug:  albuterol       promethazine-codeine 6.25-10 MG/5ML syrup   Commonly known as:  PHENERGAN with CODEINE       SUMAtriptan 50 MG tablet   Commonly known as:  IMITREX       triamcinolone 0.1 % cream   Commonly known as:  KENALOG       vitamin B-12 1000 MCG tablet   Commonly known as:  CYANOCOBALAMIN       vitamin D3 5000 UNITS capsule capsule       zolpidem 5 MG tablet   Commonly known as:  AMBIEN       *  "Notice:  This list has 2 medication(s) that are the same as other medications prescribed for you. Read the directions carefully, and ask your doctor or other care provider to review them with you.            You Were Diagnosed With        Codes Comments    S/P arthroscopy of knee    -  Primary ICD-10-CM: Z98.890  ICD-9-CM: V45.89       Instructions     None    Patient Instructions History      Upcoming Appointments     Visit Type Date Time Department    FOLLOW UP 1/19/2017 10:45 AM [facility]    POST-OP 3/6/2017  1:30 PM [facility]      [employer] Signup     Our records indicate that you have an active [employer] account.    You can view your After Visit Summary by going to [employer] and logging in with your [employer] username and password.  If you don't have a [employer] username and password but a parent or guardian has access to your record, the parent or guardian should login with their own [employer] username and password and access your record to view the After Visit Summary.    If you have questions, you can email [email or web address] or call [phone] to talk to our [employer] staff.  Remember, [employer] is NOT to be used for urgent needs.  For medical emergencies, dial 911.               Other Info from Your Visit           Your Appointments     Mar 06, 2017  1:30 PM EST   Post-Op with Starr Thomas MD   [facility] (--)    [address] [place], [place] [id number] [phone]              Allergies     Gabapentin      MIGRAINE    Telithromycin      PALPITATIONS LIVER ABNORMALITY    Cephalexin  Palpitations, Rash    Tegaderm Ag Mesh 2\"x2\" [Wound Dressings]  Rash    Thyroid  Rash    [place] thyroid      Reason for Visit     Left Knee - Follow-up           Vital Signs     Temperature Height Weight Body Mass Index Smoking Status       98.3 °F (36.8 °C) 65\" (165.1 cm) 210 lb (95.3 kg) 34.95 kg/m2 Never Smoker     "   Problems and Diagnoses Noted     Status post arthroscopy of knee

## 2017-01-19 NOTE — PROGRESS NOTES
"Knee Scope follow Up       Patient: Hellen Delgado        YOB: 1950      Chief Complaints: knee pain      History of Present Illness: Pt is here f/u knee arthroscopy on the left.  She is doing good feels like she has some achiness but overall the stabbing pain is gone she is overall happy with where she is        Allergies:   Allergies   Allergen Reactions   • Gabapentin      MIGRAINE   • Telithromycin      PALPITATIONS LIVER ABNORMALITY   • Cephalexin Palpitations and Rash   • Tegaderm Ag Mesh 2\"X2\" [Wound Dressings] Rash   • Thyroid Rash     [place] thyroid       Medications:   Home Medications:  Current Outpatient Prescriptions on File Prior to Visit   Medication Sig   • atorvastatin (LIPITOR) 40 MG tablet Take 40 mg by mouth daily.   • azithromycin (ZITHROMAX) 250 MG tablet TAKE 2 TABLETS BY MOUTH TODAY, THEN TAKE 1 TABLET DAILY FOR 4 DAYS   • Cholecalciferol (VITAMIN D3) 5000 UNITS capsule capsule Take 7,200 Units by mouth Daily.   • conjugated estrogens (PREMARIN) 0.625 MG/GM vaginal cream Insert 0.625 g into the vagina 2 (Two) Times a Week.   • fexofenadine (ALLEGRA) 180 MG tablet Take 180 mg by mouth As Needed.   • fluticasone-salmeterol (ADVAIR) 100-50 MCG/DOSE DISKUS Inhale 2 puffs As Needed.   • HYDROcodone-acetaminophen (NORCO) 7.5-325 MG per tablet 1-2 Oral Q4H to Q6H PRN severe pain   • ibuprofen (ADVIL) 200 MG tablet Take 600 mg by mouth Every 6 (Six) Hours As Needed for mild pain (1-3). PATIENT AWARE TO STOP FOR SURGERY   • lansoprazole (PREVACID) 30 MG capsule Take 30 mg by mouth As Needed.   • levothyroxine (SYNTHROID, LEVOTHROID) 137 MCG tablet Take 137 mcg by mouth Daily.   • liothyronine (CYTOMEL) 25 MCG tablet Take 12.5 mcg by mouth Daily.   • meclizine 25 MG chewable tablet chewable tablet Chew 25 mg 3 (three) times a day as needed.   • mometasone (NASONEX) 50 MCG/ACT nasal spray 2 sprays into each nostril As Needed.   • Multiple Vitamins-Minerals (MULTIVITAMIN ADULT PO) Take 1 " "tablet by mouth Daily.   • Multiple Vitamins-Minerals (VITAMIN D3 COMPLETE PO) Take 7,200 Units by mouth Daily.   • naproxen sodium (ALEVE) 220 MG tablet Take 220 mg by mouth 2 (Two) Times a Day As Needed for mild pain (1-3). PATIENT AWARE TO STOP FOR SURGERY   • predniSONE (DELTASONE) 10 MG tablet TAKE 3 TABLETS BY MOUTH FOR THREE DAYS, THEN 2 TABLETS FOR THREE DAYS, THEN 1 TABLET FOR FIVE DAYS   • PROAIR  (90 BASE) MCG/ACT inhaler INHALE 2 PUFFS INTO THE LUNGS EVERY 4 (FOUR) HOURS AS NEEDED (WHEEZ)   • promethazine-codeine (PHENERGAN with CODEINE) 6.25-10 MG/5ML syrup TAKE FIVE MLS BY MOUTH EVERY 4 HOURS AS NEEDED FOR COUGH   • SUMAtriptan (IMITREX) 50 MG tablet Take one tablet at onset of headache. May repeat dose one time in 2 hours if headache not relieved.   • triamcinolone (KENALOG) 0.1 % cream Apply 1 application topically 2 (Two) Times a Day. PRN   • vitamin B-12 (CYANOCOBALAMIN) 1000 MCG tablet Take 1,000 mcg by mouth Daily.   • zolpidem (AMBIEN) 5 MG tablet Take 5 mg by mouth at night as needed for sleep.     No current facility-administered medications on file prior to visit.      Current Medications:  Scheduled Meds:  Continuous Infusions:  No current facility-administered medications for this visit.   PRN Meds:.          Physical Exam: 66 y.o. female  General Appearance:    Alert, cooperative, in no acute distress                 Vitals:    01/19/17 1106   Temp: 98.3 °F (36.8 °C)   Weight: 210 lb (95.3 kg)   Height: 65\" (165.1 cm)      Patient is alert and oriented ×3 no acute distress normal mood physical exam.  Physical exam of the knee, incisions looked good there is no erythema, calf is soft and non-tender.  No sign or sx of DVT quads are improving knee is stable      Assessment  S/P knee scope.  Overall doing well.         Plan: Continue with strengthening, progression of activities she will make an appointment for 6 weeks I if she is doing well she may call and cancel that " appointment

## 2017-02-02 ENCOUNTER — DOCUMENTATION (OUTPATIENT)
Dept: PHYSICAL THERAPY | Facility: HOSPITAL | Age: 67
End: 2017-02-02

## 2017-02-02 NOTE — THERAPY DISCHARGE NOTE
Outpatient Physical Therapy Ortho Treatment Note/Discharge Summary       Patient Name: Hellen Delgado  : 1950  MRN: [id number]  Today's Date: 2017      Visit Date: 2017    Visit Dx:  No diagnosis found.    Patient Active Problem List   Diagnosis   • Current tear of meniscus of knee   • S/P arthroscopy of knee        Past Medical History   Diagnosis Date   • Anxiety    • Arthritis    • Bronchitis    • Depression    • Depression    • Disease of thyroid gland      underactive thyroid   • GERD (gastroesophageal reflux disease)    • Hypercholesteremia    • Migraines    • Osteopenia    • PONV (postoperative nausea and vomiting)    • Seasonal allergies    • Sleep apnea      CPAP   • Vertigo         Past Surgical History   Procedure Laterality Date   • Back surgery  2008   • Knee arthroscopy  1988   • Thoracic spine surgery  2001   • Hysterectomy     • Gallbladder surgery     • Knee arthroscopy Left 2016     Procedure: LEFT KNEE ARTHROSCOPY AND PARTIAL MEDIAL AND LATERAL MENISECTOMIES; DEBRIDEMENT ARTHRITIS;  Surgeon: Starr Thomas MD;  Location: [facility] OR [facility] [place];  Service:                                                         PT OP Goals       17 1800          PT Short Term Goals    STG Date to Achieve 17  -WS      STG 1 Pt will demonstrate understanding and compliance with initial HEP.  -WS      STG 1 Progress Met  -WS      STG 2 Pt will report decreased incidence of stiffness with prolonged sitting and tolerance to 30-60 minutes of sitting without exacerbation.  -      STG 2 Progress Met  -      STG 3 Pt will demonstrate equal step length, weight shift, and heel strike B during ambulation distances within clinic.   -      STG 3 Progress Met  -      Long Term Goals    LTG Date to Achieve 17  -WS      LTG 1 Pt will demonstrate understanding and compliance with advanced HEP and independent exercise program.  -      LTG 1 Progress Met  -      LTG 2 Pt  will demonstrate improved overall function as evidenced by reduced KOS disability score from 17% to less than or equal to 5% disability.  -      LTG 2 Progress Not Met  -      LTG 3 Pt will report ability to tolerate 30-45 minutes of independent ambulation at normal speed to allow her to return to prior level of function.  -      LTG 3 Progress Met  -        User Key  (r) = Recorded By, (t) = Taken By, (c) = Cosigned By    Initials Name Provider Type    MALLORIE Mosley PTA Physical Therapy Assistant                    Time Calculation:                  OP PT Discharge Summary  Date of Discharge: 02/02/17  Outcomes Achieved: Patient able to partially acheive established goals  Discharge Destination: Home with home program  Discharge Instructions: Patient reported no knee pain. Independent with progressive home exercise program and symptom management. Patient was discharged to continue work on strengthening with home program.       Shahid Mosley PTA  2/2/2017

## 2017-03-06 ENCOUNTER — OFFICE VISIT (OUTPATIENT)
Dept: ORTHOPEDIC SURGERY | Facility: CLINIC | Age: 67
End: 2017-03-06

## 2017-03-06 VITALS — HEIGHT: 65 IN | BODY MASS INDEX: 34.99 KG/M2 | WEIGHT: 210 LBS | TEMPERATURE: 98.3 F

## 2017-03-06 DIAGNOSIS — Z98.890 S/P ARTHROSCOPY OF KNEE: Primary | ICD-10-CM

## 2017-03-06 PROCEDURE — 20610 DRAIN/INJ JOINT/BURSA W/O US: CPT | Performed by: ORTHOPAEDIC SURGERY

## 2017-03-06 RX ORDER — METHYLPREDNISOLONE ACETATE 80 MG/ML
80 INJECTION, SUSPENSION INTRA-ARTICULAR; INTRALESIONAL; INTRAMUSCULAR; SOFT TISSUE
Status: COMPLETED | OUTPATIENT
Start: 2017-03-06 | End: 2017-03-06

## 2017-03-06 RX ORDER — TRIAMCINOLONE ACETONIDE 55 UG/1
2 SPRAY, METERED NASAL DAILY
COMMUNITY
End: 2018-03-12

## 2017-03-06 RX ORDER — BUPIVACAINE HYDROCHLORIDE 5 MG/ML
4 INJECTION, SOLUTION EPIDURAL; INTRACAUDAL
Status: COMPLETED | OUTPATIENT
Start: 2017-03-06 | End: 2017-03-06

## 2017-03-06 RX ADMIN — BUPIVACAINE HYDROCHLORIDE 4 ML: 5 INJECTION, SOLUTION EPIDURAL; INTRACAUDAL at 14:22

## 2017-03-06 RX ADMIN — METHYLPREDNISOLONE ACETATE 80 MG: 80 INJECTION, SUSPENSION INTRA-ARTICULAR; INTRALESIONAL; INTRAMUSCULAR; SOFT TISSUE at 14:22

## 2017-03-06 NOTE — PROGRESS NOTES
"Knee scope follow up follow Up 1st Visit      Patient: Hellen Delgado        YOB: 1950      Chief Complaints: knee pain  Chief Complaint   Patient presents with   • Left Knee - Follow-up         History of Present Illness: Pt is here f/u knee arthroscopy, she's doing well still little achy she thought she be less achy by now.  I did review her operative pictures with her she did have grade 4 changes in the medial compartment and patellofemoral.  I think her achiness is from her arthritis        Allergies:   Allergies   Allergen Reactions   • Gabapentin      MIGRAINE   • Telithromycin      PALPITATIONS LIVER ABNORMALITY   • Cephalexin Palpitations and Rash   • Tegaderm Ag Mesh 2\"X2\" [Wound Dressings] Rash   • Thyroid Rash     [place] thyroid       Medications:   Home Medications:  Current Outpatient Prescriptions on File Prior to Visit   Medication Sig   • atorvastatin (LIPITOR) 40 MG tablet Take 40 mg by mouth daily.   • Cholecalciferol (VITAMIN D3) 5000 UNITS capsule capsule Take 7,200 Units by mouth Daily.   • conjugated estrogens (PREMARIN) 0.625 MG/GM vaginal cream Insert 0.625 g into the vagina 2 (Two) Times a Week.   • fexofenadine (ALLEGRA) 180 MG tablet Take 180 mg by mouth As Needed.   • fluticasone-salmeterol (ADVAIR) 100-50 MCG/DOSE DISKUS Inhale 2 puffs As Needed.   • ibuprofen (ADVIL) 200 MG tablet Take 600 mg by mouth Every 6 (Six) Hours As Needed for mild pain (1-3). PATIENT AWARE TO STOP FOR SURGERY   • lansoprazole (PREVACID) 30 MG capsule Take 30 mg by mouth As Needed.   • levothyroxine (SYNTHROID, LEVOTHROID) 137 MCG tablet Take 137 mcg by mouth Daily.   • liothyronine (CYTOMEL) 25 MCG tablet Take 12.5 mcg by mouth Daily.   • meclizine 25 MG chewable tablet chewable tablet Chew 25 mg 3 (three) times a day as needed.   • Multiple Vitamins-Minerals (MULTIVITAMIN ADULT PO) Take 1 tablet by mouth Daily.   • Multiple Vitamins-Minerals (VITAMIN D3 COMPLETE PO) Take 7,200 Units by mouth " "Daily.   • naproxen sodium (ALEVE) 220 MG tablet Take 220 mg by mouth 2 (Two) Times a Day As Needed for mild pain (1-3). PATIENT AWARE TO STOP FOR SURGERY   • PROAIR  (90 BASE) MCG/ACT inhaler INHALE 2 PUFFS INTO THE LUNGS EVERY 4 (FOUR) HOURS AS NEEDED (WHEEZ)   • SUMAtriptan (IMITREX) 50 MG tablet Take one tablet at onset of headache. May repeat dose one time in 2 hours if headache not relieved.   • triamcinolone (KENALOG) 0.1 % cream Apply 1 application topically 2 (Two) Times a Day. PRN   • vitamin B-12 (CYANOCOBALAMIN) 1000 MCG tablet Take 1,000 mcg by mouth Daily.   • zolpidem (AMBIEN) 5 MG tablet Take 5 mg by mouth at night as needed for sleep.   • [DISCONTINUED] azithromycin (ZITHROMAX) 250 MG tablet TAKE 2 TABLETS BY MOUTH TODAY, THEN TAKE 1 TABLET DAILY FOR 4 DAYS   • [DISCONTINUED] HYDROcodone-acetaminophen (NORCO) 7.5-325 MG per tablet 1-2 Oral Q4H to Q6H PRN severe pain   • [DISCONTINUED] mometasone (NASONEX) 50 MCG/ACT nasal spray 2 sprays into each nostril As Needed.   • [DISCONTINUED] predniSONE (DELTASONE) 10 MG tablet TAKE 3 TABLETS BY MOUTH FOR THREE DAYS, THEN 2 TABLETS FOR THREE DAYS, THEN 1 TABLET FOR FIVE DAYS   • [DISCONTINUED] promethazine-codeine (PHENERGAN with CODEINE) 6.25-10 MG/5ML syrup TAKE FIVE MLS BY MOUTH EVERY 4 HOURS AS NEEDED FOR COUGH     No current facility-administered medications on file prior to visit.      Current Medications:  Scheduled Meds:  Continuous Infusions:  No current facility-administered medications for this visit.   PRN Meds:.          Physical Exam: 66 y.o. female  General Appearance:    Alert, cooperative, in no acute distress                 Vitals:    03/06/17 1320   Temp: 98.3 °F (36.8 °C)   TempSrc: Temporal Artery    Weight: 210 lb (95.3 kg)   Height: 65\" (165.1 cm)      Patient is alert and oriented ×3 no acute distress normal mood physical exam.  Physical exam of the knee, incisions looked good there is no erythema, calf is soft and non-tender. "  No sign or sx of DVT. Full ROM, Neg Lachman, Good SLR and quad control      Assessment  S/P knee scope.  I did review intraoperative findings and arthroscopic pictures with the patient.  Again          Plan: She is symptomatically today she like to proceed with an injection which I think is a great idea and then have her continue working on weight loss.  She can follow-up as needed    Large Joint Arthrocentesis  Date/Time: 3/6/2017 2:22 PM  Consent given by: patient  Site marked: site marked  Timeout: Immediately prior to procedure a time out was called to verify the correct patient, procedure, equipment, support staff and site/side marked as required   Supporting Documentation  Indications: pain   Procedure Details  Location: knee - L knee  Needle size: 25 G  Approach: anteromedial  Medications administered: 80 mg methylPREDNISolone acetate 80 MG/ML; 4 mL bupivacaine (PF) 0.5 %

## 2017-06-06 ENCOUNTER — CLINICAL SUPPORT (OUTPATIENT)
Dept: ORTHOPEDIC SURGERY | Facility: CLINIC | Age: 67
End: 2017-06-06

## 2017-06-06 VITALS — WEIGHT: 220 LBS | BODY MASS INDEX: 36.65 KG/M2 | TEMPERATURE: 99 F | HEIGHT: 65 IN

## 2017-06-06 DIAGNOSIS — M72.2 PLANTAR FASCIITIS: ICD-10-CM

## 2017-06-06 DIAGNOSIS — R52 PAIN: Primary | ICD-10-CM

## 2017-06-06 PROCEDURE — 20610 DRAIN/INJ JOINT/BURSA W/O US: CPT | Performed by: ORTHOPAEDIC SURGERY

## 2017-06-06 PROCEDURE — 99212 OFFICE O/P EST SF 10 MIN: CPT | Performed by: ORTHOPAEDIC SURGERY

## 2017-06-06 RX ORDER — METHYLPREDNISOLONE ACETATE 80 MG/ML
80 INJECTION, SUSPENSION INTRA-ARTICULAR; INTRALESIONAL; INTRAMUSCULAR; SOFT TISSUE
Status: COMPLETED | OUTPATIENT
Start: 2017-06-06 | End: 2017-06-06

## 2017-06-06 RX ORDER — BUPIVACAINE HYDROCHLORIDE 5 MG/ML
4 INJECTION, SOLUTION EPIDURAL; INTRACAUDAL
Status: COMPLETED | OUTPATIENT
Start: 2017-06-06 | End: 2017-06-06

## 2017-06-06 RX ORDER — BUPROPION HYDROCHLORIDE 150 MG/1
TABLET, EXTENDED RELEASE ORAL
Refills: 3 | COMMUNITY
Start: 2017-05-10 | End: 2019-08-01

## 2017-06-06 RX ADMIN — BUPIVACAINE HYDROCHLORIDE 4 ML: 5 INJECTION, SOLUTION EPIDURAL; INTRACAUDAL at 12:56

## 2017-06-06 RX ADMIN — METHYLPREDNISOLONE ACETATE 80 MG: 80 INJECTION, SUSPENSION INTRA-ARTICULAR; INTRALESIONAL; INTRAMUSCULAR; SOFT TISSUE at 12:56

## 2017-06-06 NOTE — PROGRESS NOTES
"Left Knee Joint Injection      Patient: Hellen Delgado        YOB: 1950            Chief Complaints: Left Knee pain right heel pain  Chief Complaint   Patient presents with   • Left Knee - Follow-up           History of Present Illness: Pt gets intermittent  injections with good relief. Is here for repeat injection. Understands options.Her heels been bothering for several months.  She does some active stretching just standing nonweightbearing worse in the morning and then progressed with activities it is dedicated heel pain no swelling overlying skin changes no history of injury or change in shoe wear  Physical Exam: 66 y.o. female  General Appearance:    Alert, cooperative, in no acute distress                   Vitals:    06/06/17 1254   Temp: 99 °F (37.2 °C)   TempSrc: Temporal Artery    Weight: 220 lb (99.8 kg)   Height: 65\" (165.1 cm)      Patient is alert and read ×3 no acute distress appears her above-listed at height weight and age.  Affect is normal respiratory rate is normal unlabored. Heart rate regular rate rhythm, sclera, dentition and hearing are normal for the purpose of this exam.  Exam and complaints are unchanged.Physical exam her right heel she has palpable tenderness over the origin the plantar fascia she has tight heel cords no overlying skin changes ankle exam is otherwise normal good distal pulses  Procedure:  Large Joint Arthrocentesis  Date/Time: 6/6/2017 12:56 PM  Consent given by: patient  Site marked: site marked  Timeout: Immediately prior to procedure a time out was called to verify the correct patient, procedure, equipment, support staff and site/side marked as required   Supporting Documentation  Indications: pain   Procedure Details  Location: knee - L knee  Needle size: 25 G  Approach: anteromedial  Medications administered: 4 mL bupivacaine (PF) 0.5 %; 80 mg methylPREDNISolone acetate 80 MG/ML                  Assessment. Persistent knee pain plan is proceed with " an injection, as far as her heel goes I think this is plantar fasciitis.  I did show her how to stretch this against the wall we talked about specifics are stretching she understands these      Plan: Is to proceed with injection    The knee joint was injected under strict sterile technique with Marcaine and Depo-Medrol this was done sterilely and tolerated tolerated well.  We'll work on stretching her heel cords if her heel pain does not improve we will pursue other means of testing

## 2017-09-12 ENCOUNTER — CLINICAL SUPPORT (OUTPATIENT)
Dept: ORTHOPEDIC SURGERY | Facility: CLINIC | Age: 67
End: 2017-09-12

## 2017-09-12 VITALS — WEIGHT: 221.6 LBS | TEMPERATURE: 98 F | HEIGHT: 65 IN | BODY MASS INDEX: 36.92 KG/M2

## 2017-09-12 DIAGNOSIS — G89.29 CHRONIC PAIN OF LEFT KNEE: Primary | ICD-10-CM

## 2017-09-12 DIAGNOSIS — M25.562 CHRONIC PAIN OF LEFT KNEE: Primary | ICD-10-CM

## 2017-09-12 PROCEDURE — 20610 DRAIN/INJ JOINT/BURSA W/O US: CPT | Performed by: ORTHOPAEDIC SURGERY

## 2017-09-12 PROCEDURE — 73562 X-RAY EXAM OF KNEE 3: CPT | Performed by: ORTHOPAEDIC SURGERY

## 2017-09-12 RX ORDER — METHYLPREDNISOLONE ACETATE 80 MG/ML
80 INJECTION, SUSPENSION INTRA-ARTICULAR; INTRALESIONAL; INTRAMUSCULAR; SOFT TISSUE
Status: COMPLETED | OUTPATIENT
Start: 2017-09-12 | End: 2017-09-12

## 2017-09-12 RX ORDER — BUPIVACAINE HYDROCHLORIDE 5 MG/ML
4 INJECTION, SOLUTION EPIDURAL; INTRACAUDAL
Status: COMPLETED | OUTPATIENT
Start: 2017-09-12 | End: 2017-09-12

## 2017-09-12 RX ADMIN — METHYLPREDNISOLONE ACETATE 80 MG: 80 INJECTION, SUSPENSION INTRA-ARTICULAR; INTRALESIONAL; INTRAMUSCULAR; SOFT TISSUE at 12:15

## 2017-09-12 RX ADMIN — BUPIVACAINE HYDROCHLORIDE 4 ML: 5 INJECTION, SOLUTION EPIDURAL; INTRACAUDAL at 12:15

## 2017-09-12 NOTE — PROGRESS NOTES
"Left Knee Joint Injection      Patient: Hellen Delgado        YOB: 1950            Chief Complaints: Left Knee pain  Chief Complaint   Patient presents with   • Left Knee - Follow-up           History of Present Illness: Pt gets intermittent  injections with good relief. Is here for repeat injection. Understands options.      Physical Exam: 66 y.o. female  General Appearance:    Alert, cooperative, in no acute distress                   Vitals:    09/12/17 1214   Temp: 98 °F (36.7 °C)   TempSrc: Temporal Artery    Weight: 221 lb 9.6 oz (101 kg)   Height: 65\" (165.1 cm)      Patient is alert and read ×3 no acute distress appears her above-listed at height weight and age.  Affect is normal respiratory rate is normal unlabored. Heart rate regular rate rhythm, sclera, dentition and hearing are normal for the purpose of this exam.  Exam and complaints are unchanged.      Procedure:  Large Joint Arthrocentesis  Date/Time: 9/12/2017 12:15 PM  Consent given by: patient  Site marked: site marked  Timeout: Immediately prior to procedure a time out was called to verify the correct patient, procedure, equipment, support staff and site/side marked as required   Supporting Documentation  Indications: pain   Procedure Details  Location: knee - L knee  Preparation: Patient was prepped and draped in the usual sterile fashion  Needle size: 25 G  Approach: anteromedial  Medications administered: 80 mg methylPREDNISolone acetate 80 MG/ML; 4 mL bupivacaine (PF) 0.5 %            X-rays AP lateral merchant view were taken of the left knee to evaluate her symptoms and compared to previous films.  She does have narrowing of her medial compartment with approximately 50% less joint space apparent on the medial aspect versus her last x-rays      Assessment. Persistent knee pain      Plan: Is to proceed with injection    The knee joint was injected under strict sterile technique with Marcaine and Depo-Medrol this was done " sterilely and tolerated tolerated well.

## 2017-12-12 ENCOUNTER — CLINICAL SUPPORT (OUTPATIENT)
Dept: ORTHOPEDIC SURGERY | Facility: CLINIC | Age: 67
End: 2017-12-12

## 2017-12-12 VITALS — HEIGHT: 65 IN | WEIGHT: 220 LBS | BODY MASS INDEX: 36.65 KG/M2

## 2017-12-12 DIAGNOSIS — G89.29 CHRONIC PAIN OF LEFT KNEE: Primary | ICD-10-CM

## 2017-12-12 DIAGNOSIS — M17.12 PRIMARY LOCALIZED OSTEOARTHROSIS OF LEFT LOWER LEG: ICD-10-CM

## 2017-12-12 DIAGNOSIS — M25.562 CHRONIC PAIN OF LEFT KNEE: Primary | ICD-10-CM

## 2017-12-12 PROCEDURE — 20610 DRAIN/INJ JOINT/BURSA W/O US: CPT | Performed by: ORTHOPAEDIC SURGERY

## 2017-12-12 RX ADMIN — BUPIVACAINE HYDROCHLORIDE 4 ML: 5 INJECTION, SOLUTION EPIDURAL; INTRACAUDAL at 13:25

## 2017-12-12 RX ADMIN — METHYLPREDNISOLONE ACETATE 80 MG: 80 INJECTION, SUSPENSION INTRA-ARTICULAR; INTRALESIONAL; INTRAMUSCULAR; SOFT TISSUE at 13:25

## 2017-12-12 NOTE — PROGRESS NOTES
"Knee Joint Injection      Patient: Hellen Delgado        YOB: 1950            Chief Complaints: Knee pain left  History of Present Illness: Pt gets intermittent  injections with good relief. Is here for repeat injection. Understands options.      Physical Exam: 66 y.o. female  General Appearance:    Alert, cooperative, in no acute distress                   Vitals:    12/12/17 1324   Weight: 99.8 kg (220 lb)   Height: 165.1 cm (65\")      Patient is alert and read ×3 no acute distress appears her above-listed at height weight and age.  Affect is normal respiratory rate is normal unlabored. Heart rate regular rate rhythm, sclera, dentition and hearing are normal for the purpose of this exam.  Exam and complaints are unchanged.      Procedure:  Large Joint Arthrocentesis  Date/Time: 12/12/2017 1:25 PM  Consent given by: patient  Site marked: site marked  Timeout: Immediately prior to procedure a time out was called to verify the correct patient, procedure, equipment, support staff and site/side marked as required   Supporting Documentation  Indications: pain   Procedure Details  Location: knee - L knee  Needle size: 25 G  Approach: anteromedial  Medications administered: 80 mg methylPREDNISolone acetate 80 MG/ML; 4 mL bupivacaine (PF) 0.5 %  Patient tolerance: patient tolerated the procedure well with no immediate complications                Assessment. Persistent knee pain      Plan: Is to proceed with injection    The knee joint was injected under strict sterile technique with Marcaine and Depo-Medrol this was done sterilely and tolerated tolerated well.  I would like her to see Dr. Kinsey to talk about arthroplasty options she would like to hold off on this at this time.        "

## 2017-12-14 RX ORDER — BUPIVACAINE HYDROCHLORIDE 5 MG/ML
4 INJECTION, SOLUTION EPIDURAL; INTRACAUDAL
Status: COMPLETED | OUTPATIENT
Start: 2017-12-12 | End: 2017-12-12

## 2017-12-14 RX ORDER — METHYLPREDNISOLONE ACETATE 80 MG/ML
80 INJECTION, SUSPENSION INTRA-ARTICULAR; INTRALESIONAL; INTRAMUSCULAR; SOFT TISSUE
Status: COMPLETED | OUTPATIENT
Start: 2017-12-12 | End: 2017-12-12

## 2018-03-12 ENCOUNTER — CLINICAL SUPPORT (OUTPATIENT)
Dept: ORTHOPEDIC SURGERY | Facility: CLINIC | Age: 68
End: 2018-03-12

## 2018-03-12 VITALS — BODY MASS INDEX: 35.03 KG/M2 | WEIGHT: 218 LBS | TEMPERATURE: 99 F | HEIGHT: 66 IN

## 2018-03-12 DIAGNOSIS — M17.12 PRIMARY LOCALIZED OSTEOARTHROSIS OF LEFT LOWER LEG: Primary | ICD-10-CM

## 2018-03-12 PROCEDURE — 20610 DRAIN/INJ JOINT/BURSA W/O US: CPT | Performed by: ORTHOPAEDIC SURGERY

## 2018-03-12 RX ORDER — LIDOCAINE HYDROCHLORIDE 10 MG/ML
4 INJECTION, SOLUTION EPIDURAL; INFILTRATION; INTRACAUDAL; PERINEURAL
Status: COMPLETED | OUTPATIENT
Start: 2018-03-12 | End: 2018-03-12

## 2018-03-12 RX ORDER — METHYLPREDNISOLONE ACETATE 80 MG/ML
80 INJECTION, SUSPENSION INTRA-ARTICULAR; INTRALESIONAL; INTRAMUSCULAR; SOFT TISSUE
Status: COMPLETED | OUTPATIENT
Start: 2018-03-12 | End: 2018-03-12

## 2018-03-12 RX ADMIN — METHYLPREDNISOLONE ACETATE 80 MG: 80 INJECTION, SUSPENSION INTRA-ARTICULAR; INTRALESIONAL; INTRAMUSCULAR; SOFT TISSUE at 12:34

## 2018-03-12 RX ADMIN — LIDOCAINE HYDROCHLORIDE 4 ML: 10 INJECTION, SOLUTION EPIDURAL; INFILTRATION; INTRACAUDAL; PERINEURAL at 12:34

## 2018-03-12 NOTE — PROGRESS NOTES
"Left Knee Joint Injection      Patient: Hellen Delgado        YOB: 1950            Chief Complaints: Left knee pain      History of Present Illness: Pt gets intermittent  injections with good relief. Is here for repeat injection. Understands options.      Physical Exam: 67 y.o. female  General Appearance:    Alert, cooperative, in no acute distress                   Vitals:    03/12/18 1228   Temp: 99 °F (37.2 °C)   TempSrc: Temporal Artery    Weight: 98.9 kg (218 lb)   Height: 166.4 cm (65.5\")      Patient is alert and read ×3 no acute distress appears her above-listed at height weight and age.  Affect is normal respiratory rate is normal unlabored. Heart rate regular rate rhythm, sclera, dentition and hearing are normal for the purpose of this exam.  Exam and complaints are unchanged.      Procedure:  Large Joint Arthrocentesis  Date/Time: 3/12/2018 12:34 PM  Consent given by: patient  Site marked: site marked  Timeout: Immediately prior to procedure a time out was called to verify the correct patient, procedure, equipment, support staff and site/side marked as required   Supporting Documentation  Indications: pain   Procedure Details  Location: knee - L knee  Preparation: Patient was prepped and draped in the usual sterile fashion  Needle size: 25 G  Approach: anteromedial  Medications administered: 4 mL lidocaine PF 1% 1 %; 80 mg methylPREDNISolone acetate 80 MG/ML                  Assessment. Persistent knee pain      Plan: Is to proceed with injection    The knee joint was injected under strict sterile technique with Marcaine and Depo-Medrol this was done sterilely and tolerated tolerated well.          "

## 2018-06-25 ENCOUNTER — CLINICAL SUPPORT (OUTPATIENT)
Dept: ORTHOPEDIC SURGERY | Facility: CLINIC | Age: 68
End: 2018-06-25

## 2018-06-25 VITALS — BODY MASS INDEX: 34.89 KG/M2 | HEIGHT: 65 IN | TEMPERATURE: 98.4 F | WEIGHT: 209.4 LBS

## 2018-06-25 DIAGNOSIS — M19.90 ARTHRITIS: Primary | ICD-10-CM

## 2018-06-25 PROCEDURE — 20610 DRAIN/INJ JOINT/BURSA W/O US: CPT | Performed by: ORTHOPAEDIC SURGERY

## 2018-06-25 RX ORDER — LIDOCAINE HYDROCHLORIDE 10 MG/ML
4 INJECTION, SOLUTION EPIDURAL; INFILTRATION; INTRACAUDAL; PERINEURAL
Status: COMPLETED | OUTPATIENT
Start: 2018-06-25 | End: 2018-06-25

## 2018-06-25 RX ORDER — MULTIVIT WITH MINERALS/LUTEIN
250 TABLET ORAL DAILY
COMMUNITY
End: 2019-08-15 | Stop reason: HOSPADM

## 2018-06-25 RX ORDER — METHYLPREDNISOLONE ACETATE 80 MG/ML
80 INJECTION, SUSPENSION INTRA-ARTICULAR; INTRALESIONAL; INTRAMUSCULAR; SOFT TISSUE
Status: COMPLETED | OUTPATIENT
Start: 2018-06-25 | End: 2018-06-25

## 2018-06-25 RX ADMIN — LIDOCAINE HYDROCHLORIDE 4 ML: 10 INJECTION, SOLUTION EPIDURAL; INFILTRATION; INTRACAUDAL; PERINEURAL at 12:36

## 2018-06-25 RX ADMIN — METHYLPREDNISOLONE ACETATE 80 MG: 80 INJECTION, SUSPENSION INTRA-ARTICULAR; INTRALESIONAL; INTRAMUSCULAR; SOFT TISSUE at 12:36

## 2018-06-25 NOTE — PROGRESS NOTES
"Left Knee Joint Injection      Patient: Hellen Delgado        YOB: 1950            Chief Complaints: left Knee pain      History of Present Illness: Pt gets intermittent  injections with good relief. Is here for repeat injection. Understands options.      Physical Exam: 67 y.o. female  General Appearance:    Alert, cooperative, in no acute distress                   Vitals:    06/25/18 1233   Temp: 98.4 °F (36.9 °C)   TempSrc: Temporal Artery    Weight: 95 kg (209 lb 6.4 oz)   Height: 163.8 cm (64.5\")      Patient is alert and read ×3 no acute distress appears her above-listed at height weight and age.  Affect is normal respiratory rate is normal unlabored. Heart rate regular rate rhythm, sclera, dentition and hearing are normal for the purpose of this exam.  Exam and complaints are unchanged.      Procedure:  Large Joint Arthrocentesis  Date/Time: 6/25/2018 12:36 PM  Consent given by: patient  Site marked: site marked  Timeout: Immediately prior to procedure a time out was called to verify the correct patient, procedure, equipment, support staff and site/side marked as required   Supporting Documentation  Indications: pain   Procedure Details  Location: knee - L knee  Preparation: Patient was prepped and draped in the usual sterile fashion  Needle size: 22 G  Approach: anteromedial  Medications administered: 4 mL lidocaine PF 1% 1 %; 80 mg methylPREDNISolone acetate 80 MG/ML  Patient tolerance: patient tolerated the procedure well with no immediate complications                Assessment. Persistent knee pain      Plan: Is to proceed with injection    The knee joint was injected under strict sterile technique with Marcaine and Depo-Medrol this was done sterilely and tolerated tolerated well.          "

## 2018-10-08 ENCOUNTER — CLINICAL SUPPORT (OUTPATIENT)
Dept: ORTHOPEDIC SURGERY | Facility: CLINIC | Age: 68
End: 2018-10-08

## 2018-10-08 VITALS — TEMPERATURE: 98.1 F | WEIGHT: 213.2 LBS | HEIGHT: 65 IN | BODY MASS INDEX: 35.52 KG/M2

## 2018-10-08 DIAGNOSIS — M17.12 PRIMARY LOCALIZED OSTEOARTHROSIS OF LEFT LOWER LEG: Primary | ICD-10-CM

## 2018-10-08 PROCEDURE — 73562 X-RAY EXAM OF KNEE 3: CPT | Performed by: ORTHOPAEDIC SURGERY

## 2018-10-08 PROCEDURE — 20610 DRAIN/INJ JOINT/BURSA W/O US: CPT | Performed by: ORTHOPAEDIC SURGERY

## 2018-10-08 RX ADMIN — LIDOCAINE HYDROCHLORIDE 4 ML: 20 INJECTION, SOLUTION EPIDURAL; INFILTRATION; INTRACAUDAL; PERINEURAL at 12:29

## 2018-10-08 RX ADMIN — METHYLPREDNISOLONE ACETATE 80 MG: 80 INJECTION, SUSPENSION INTRA-ARTICULAR; INTRALESIONAL; INTRAMUSCULAR; SOFT TISSUE at 12:29

## 2018-10-08 NOTE — PROGRESS NOTES
"Left Knee Joint Injection      Patient: Hellen Delgado        YOB: 1950            Chief Complaints:  left Knee pain  Chief Complaint   Patient presents with   • Left Knee - Follow-up, Pain           History of Present Illness: Pt gets intermittent  injections with good relief. Is here for repeat injection. Understands options.      Physical Exam: 67 y.o. female  General Appearance:    Alert, cooperative, in no acute distress                   Vitals:    10/08/18 1227   Temp: 98.1 °F (36.7 °C)   TempSrc: Temporal Artery    Weight: 96.7 kg (213 lb 3.2 oz)   Height: 165.1 cm (65\")      Patient is alert and read ×3 no acute distress appears her above-listed at height weight and age.  Affect is normal respiratory rate is normal unlabored. Heart rate regular rate rhythm, sclera, dentition and hearing are normal for the purpose of this exam.  Exam and complaints are unchanged.      Procedure:  Large Joint Arthrocentesis  Date/Time: 10/8/2018 12:29 PM  Consent given by: patient  Site marked: site marked  Timeout: Immediately prior to procedure a time out was called to verify the correct patient, procedure, equipment, support staff and site/side marked as required   Supporting Documentation  Indications: pain   Procedure Details  Location: knee - L knee  Needle size: 25 G  Approach: anteromedial  Medications administered: 80 mg methylPREDNISolone acetate 80 MG/ML; 4 mL lidocaine PF 2% 2 %  Patient tolerance: patient tolerated the procedure well with no immediate complications            X-rays AP lateral merchant view of the left knee were taken to evaluate her symptoms and compared to previous films she does have some narrowing about her medial compartment which is somewhat worse from her last x-rays    Assessment. Persistent knee pain      Plan: Is to proceed with injection    The knee joint was injected under strict sterile technique with Marcaine and Depo-Medrol this was done sterilely and tolerated " tolerated well.

## 2018-10-09 RX ORDER — LIDOCAINE HYDROCHLORIDE 20 MG/ML
4 INJECTION, SOLUTION EPIDURAL; INFILTRATION; INTRACAUDAL; PERINEURAL
Status: COMPLETED | OUTPATIENT
Start: 2018-10-08 | End: 2018-10-08

## 2018-10-09 RX ORDER — METHYLPREDNISOLONE ACETATE 80 MG/ML
80 INJECTION, SUSPENSION INTRA-ARTICULAR; INTRALESIONAL; INTRAMUSCULAR; SOFT TISSUE
Status: COMPLETED | OUTPATIENT
Start: 2018-10-08 | End: 2018-10-08

## 2019-01-22 ENCOUNTER — CLINICAL SUPPORT (OUTPATIENT)
Dept: ORTHOPEDIC SURGERY | Facility: CLINIC | Age: 69
End: 2019-01-22

## 2019-01-22 VITALS — WEIGHT: 210 LBS | HEIGHT: 65 IN | TEMPERATURE: 98.3 F | BODY MASS INDEX: 34.99 KG/M2

## 2019-01-22 DIAGNOSIS — M17.12 PRIMARY LOCALIZED OSTEOARTHROSIS OF LEFT LOWER LEG: Primary | ICD-10-CM

## 2019-01-22 PROCEDURE — 20610 DRAIN/INJ JOINT/BURSA W/O US: CPT | Performed by: ORTHOPAEDIC SURGERY

## 2019-01-22 RX ORDER — METHYLPREDNISOLONE ACETATE 80 MG/ML
80 INJECTION, SUSPENSION INTRA-ARTICULAR; INTRALESIONAL; INTRAMUSCULAR; SOFT TISSUE
Status: COMPLETED | OUTPATIENT
Start: 2019-01-22 | End: 2019-01-22

## 2019-01-22 RX ORDER — LIDOCAINE HYDROCHLORIDE 20 MG/ML
4 INJECTION, SOLUTION EPIDURAL; INFILTRATION; INTRACAUDAL; PERINEURAL
Status: COMPLETED | OUTPATIENT
Start: 2019-01-22 | End: 2019-01-22

## 2019-01-22 RX ADMIN — METHYLPREDNISOLONE ACETATE 80 MG: 80 INJECTION, SUSPENSION INTRA-ARTICULAR; INTRALESIONAL; INTRAMUSCULAR; SOFT TISSUE at 12:29

## 2019-01-22 RX ADMIN — LIDOCAINE HYDROCHLORIDE 4 ML: 20 INJECTION, SOLUTION EPIDURAL; INFILTRATION; INTRACAUDAL; PERINEURAL at 12:29

## 2019-01-22 NOTE — PROGRESS NOTES
Left Knee Joint Injection      Patient: Hellen Delgado        YOB: 1950            Chief Complaints:left Knee pain  Chief Complaint   Patient presents with   • Left Knee - Injections         History of Present Illness: Pt gets intermittent  injections with good relief. Is here for repeat injection. Understands options.      Physical Exam: 68 y.o. female  General Appearance:    Alert, cooperative, in no acute distress                 There were no vitals filed for this visit.   Patient is alert and read ×3 no acute distress appears her above-listed at height weight and age.  Affect is normal respiratory rate is normal unlabored. Heart rate regular rate rhythm, sclera, dentition and hearing are normal for the purpose of this exam.  Exam and complaints are unchanged.      Procedure:  Large Joint Arthrocentesis: L knee  Date/Time: 1/22/2019 12:29 PM  Consent given by: patient  Site marked: site marked  Timeout: Immediately prior to procedure a time out was called to verify the correct patient, procedure, equipment, support staff and site/side marked as required   Supporting Documentation  Indications: pain   Procedure Details  Location: knee - L knee  Preparation: Patient was prepped and draped in the usual sterile fashion  Needle size: 22 G  Approach: anteromedial  Medications administered: 4 mL lidocaine PF 2% 2 %; 80 mg methylPREDNISolone acetate 80 MG/ML  Patient tolerance: patient tolerated the procedure well with no immediate complications                Assessment. Persistent knee pain      Plan: Is to proceed with injection    The knee joint was injected under strict sterile technique with Marcaine and Depo-Medrol this was done sterilely and tolerated tolerated well.

## 2019-04-23 ENCOUNTER — CLINICAL SUPPORT (OUTPATIENT)
Dept: ORTHOPEDIC SURGERY | Facility: CLINIC | Age: 69
End: 2019-04-23

## 2019-04-23 VITALS — HEIGHT: 65 IN | TEMPERATURE: 98.8 F | BODY MASS INDEX: 37.82 KG/M2 | WEIGHT: 227 LBS

## 2019-04-23 DIAGNOSIS — M17.12 PRIMARY LOCALIZED OSTEOARTHROSIS OF LEFT LOWER LEG: Primary | ICD-10-CM

## 2019-04-23 PROCEDURE — 20610 DRAIN/INJ JOINT/BURSA W/O US: CPT | Performed by: ORTHOPAEDIC SURGERY

## 2019-04-23 RX ORDER — METHYLPREDNISOLONE ACETATE 80 MG/ML
80 INJECTION, SUSPENSION INTRA-ARTICULAR; INTRALESIONAL; INTRAMUSCULAR; SOFT TISSUE
Status: COMPLETED | OUTPATIENT
Start: 2019-04-23 | End: 2019-04-23

## 2019-04-23 RX ORDER — LIDOCAINE HYDROCHLORIDE 20 MG/ML
4 INJECTION, SOLUTION EPIDURAL; INFILTRATION; INTRACAUDAL; PERINEURAL
Status: COMPLETED | OUTPATIENT
Start: 2019-04-23 | End: 2019-04-23

## 2019-04-23 RX ADMIN — METHYLPREDNISOLONE ACETATE 80 MG: 80 INJECTION, SUSPENSION INTRA-ARTICULAR; INTRALESIONAL; INTRAMUSCULAR; SOFT TISSUE at 12:15

## 2019-04-23 RX ADMIN — LIDOCAINE HYDROCHLORIDE 4 ML: 20 INJECTION, SOLUTION EPIDURAL; INFILTRATION; INTRACAUDAL; PERINEURAL at 12:15

## 2019-04-23 NOTE — PROGRESS NOTES
Left Knee Joint Injection      Patient: Hellen Delgado        YOB: 1950            Chief Complaints:left  Knee pain  Chief Complaint   Patient presents with   • Left Knee - Injections         History of Present Illness: Pt gets intermittent  injections with good relief. Is here for repeat injection. Understands options.      Physical Exam: 68 y.o. female  General Appearance:    Alert, cooperative, in no acute distress                 There were no vitals filed for this visit.   Patient is alert and read ×3 no acute distress appears her above-listed at height weight and age.  Affect is normal respiratory rate is normal unlabored. Heart rate regular rate rhythm, sclera, dentition and hearing are normal for the purpose of this exam.  Exam and complaints are unchanged.      Procedure:  Large Joint Arthrocentesis: L knee  Date/Time: 4/23/2019 12:15 PM  Consent given by: patient  Site marked: site marked  Timeout: Immediately prior to procedure a time out was called to verify the correct patient, procedure, equipment, support staff and site/side marked as required   Supporting Documentation  Indications: pain   Procedure Details  Location: knee - L knee  Preparation: Patient was prepped and draped in the usual sterile fashion  Needle size: 22 G  Approach: anteromedial  Medications administered: 4 mL lidocaine PF 2% 2 %; 80 mg methylPREDNISolone acetate 80 MG/ML  Patient tolerance: patient tolerated the procedure well with no immediate complications                Assessment. Persistent knee pain      Plan: Is to proceed with injection    The knee joint was injected under strict sterile technique with Marcaine and Depo-Medrol this was done sterilely and tolerated tolerated well.    Sx are worsening  I will have her see [facility]

## 2019-05-21 ENCOUNTER — TELEPHONE (OUTPATIENT)
Dept: ORTHOPEDIC SURGERY | Facility: CLINIC | Age: 69
End: 2019-05-21

## 2019-05-22 NOTE — TELEPHONE ENCOUNTER
I would ice this and rested and see if her symptoms resolve.  If it is not fracture it should resolve if it is still symptomatic in a week we could x-ray it.  An alternative option would be to get an x-ray at an urgent care

## 2019-06-13 ENCOUNTER — CONSULT (OUTPATIENT)
Dept: ORTHOPEDIC SURGERY | Facility: CLINIC | Age: 69
End: 2019-06-13

## 2019-06-13 VITALS — WEIGHT: 222.6 LBS | BODY MASS INDEX: 37.09 KG/M2 | HEIGHT: 65 IN

## 2019-06-13 DIAGNOSIS — M25.562 CHRONIC PAIN OF LEFT KNEE: Primary | ICD-10-CM

## 2019-06-13 DIAGNOSIS — G89.29 CHRONIC PAIN OF LEFT KNEE: Primary | ICD-10-CM

## 2019-06-13 DIAGNOSIS — M17.12 PRIMARY OSTEOARTHRITIS OF LEFT KNEE: ICD-10-CM

## 2019-06-13 PROCEDURE — 73562 X-RAY EXAM OF KNEE 3: CPT | Performed by: ORTHOPAEDIC SURGERY

## 2019-06-13 PROCEDURE — 99214 OFFICE O/P EST MOD 30 MIN: CPT | Performed by: ORTHOPAEDIC SURGERY

## 2019-06-13 RX ORDER — MELOXICAM 15 MG/1
15 TABLET ORAL ONCE
Status: CANCELLED | OUTPATIENT
Start: 2019-08-14 | End: 2019-06-13

## 2019-06-13 RX ORDER — CLINDAMYCIN PHOSPHATE 900 MG/50ML
900 INJECTION INTRAVENOUS ONCE
Status: CANCELLED | OUTPATIENT
Start: 2019-08-14 | End: 2019-06-13

## 2019-06-13 RX ORDER — PREGABALIN 75 MG/1
150 CAPSULE ORAL ONCE
Status: CANCELLED | OUTPATIENT
Start: 2019-08-14 | End: 2019-06-13

## 2019-06-13 NOTE — PROGRESS NOTES
"Patient: Hellen Delgado  YOB: 1950 68 y.o. female  Medical Record Number: [id number]    Chief Complaints: Left knee pain  Chief Complaint   Patient presents with   • Left Knee - Establish Care, Pain       History of Present Illness:Hellen Delgado is a 68 y.o. female who presents with complaints of severe constant left knee pain.  She has a moderate to severe stabbing aching pain which is been present for over 4 years.  Is worse with standing or sitting and also has pain with walking even short distances.  The pain has begun to limit basic activities of daily living despite conservative measures such as injections physical therapy and anti-inflammatories.    Allergies:   Allergies   Allergen Reactions   • Gabapentin      MIGRAINE   • Telithromycin      PALPITATIONS LIVER ABNORMALITY   • Cephalexin Palpitations and Rash   • Tegaderm Ag Mesh 2\"X2\" [Wound Dressings] Rash   • Thyroid Rash     [place] thyroid       Medications:   Current Outpatient Medications   Medication Sig Dispense Refill   • atorvastatin (LIPITOR) 40 MG tablet Take 40 mg by mouth daily.     • buPROPion SR (WELLBUTRIN SR) 150 MG 12 hr tablet TAKE 1 TABLET BY MOUTH TWICE A DAY  3   • Cholecalciferol (VITAMIN D3) 5000 UNITS capsule capsule Take 7,200 Units by mouth Daily.     • Coenzyme Q10 10 MG capsule Take  by mouth.     • conjugated estrogens (PREMARIN) 0.625 MG/GM vaginal cream Insert 0.625 g into the vagina 2 (Two) Times a Week.     • fexofenadine (ALLEGRA) 180 MG tablet Take 180 mg by mouth As Needed.     • hydrocortisone 2.5 % ointment Apply 1 application topically 2 (Two) Times a Day.     • ibuprofen (ADVIL) 200 MG tablet Take 600 mg by mouth Every 6 (Six) Hours As Needed for mild pain (1-3). PATIENT AWARE TO STOP FOR SURGERY     • lansoprazole (PREVACID) 30 MG capsule Take 30 mg by mouth As Needed.     • levothyroxine (SYNTHROID, LEVOTHROID) 137 MCG tablet Take 137 mcg by mouth Daily.     • liothyronine (CYTOMEL) 25 MCG " "tablet Take 12.5 mcg by mouth Daily.     • Multiple Vitamins-Minerals (MULTIVITAMIN ADULT PO) Take 1 tablet by mouth Daily.     • Multiple Vitamins-Minerals (VITAMIN D3 COMPLETE PO) Take 7,200 Units by mouth Daily.     • naproxen sodium (ALEVE) 220 MG tablet Take 220 mg by mouth 2 (Two) Times a Day As Needed for mild pain (1-3). PATIENT AWARE TO STOP FOR SURGERY     • SUMAtriptan (IMITREX) 50 MG tablet Take one tablet at onset of headache. May repeat dose one time in 2 hours if headache not relieved.     • triamcinolone (KENALOG) 0.1 % cream Apply 1 application topically 2 (Two) Times a Day. PRN     • vitamin B-12 (CYANOCOBALAMIN) 1000 MCG tablet Take 1,000 mcg by mouth Daily.     • vitamin C (ASCORBIC ACID) 250 MG tablet Take 250 mg by mouth Daily.       No current facility-administered medications for this visit.          The following portions of the patient's history were reviewed and updated as appropriate: allergies, current medications, past family history, past medical history, past social history, past surgical history and problem list.    Review of Systems:   A 14 point review of systems was performed. All systems negative except pertinent positives/negative listed in HPI above    Physical Exam:   Vitals:    06/13/19 0826   Weight: 101 kg (222 lb 9.6 oz)   Height: 163.8 cm (64.5\")       General: A and O x 3, ASA, NAD    SCLERA:    Normal    DENTITION:   Normal   Knee:  left    ALIGNMENT:     Varus  ,   Patella  tracks  midline    GAIT:    Antalgic    SKIN:    No abnormality    RANGE OF MOTION:   3  -  120   DEG    STRENGTH:   4  / 5    LIGAMENTS:    No varus / valgus instability.   Negative  Lachman.    MENISCUS:     Negative   Ney       DISTAL PULSES:    Paplable    DISTAL SENSATION :   Intact    LYMPHATICS:     No   lymphadenopathy    OTHER:          - Positive   effusion      - Crepitance with ROM   .       Radiology:  Xrays 3views left knee (ap,lateral, sunrise) were ordered and reviewed for " evaluation of knee pain demonstrating advanced varus osteoarthritis with bone on bone articulation, subchondral cysts, and periarticular osteophytes. In comparison to previous films there are no changes    Assessment/Plan: Left knee advanced end-stage osteoarthritis.  She has failed the full complement of conservative measures.  Continuation of conservative management vs. TKA discussed.  The patient wishes to proceed with total knee replacement.  At this point the patient has failed the full compliment of conservative treatment and stating complete understanding of the risks/benefits/ anternatives wishes to proceed with surgical treatment.    Risk and benefits of surgery were reviewed.  Including, but not limited to, blood clots or pulmonary embolism, anesthesia risk, infection, fracture, skin/leg numbness, persistent pain/crepitance/popping/catching, failure of the implant, need for future surgeries, hematoma, possible nerve or blood vessel injury, need for transfusion, and potential risk of stroke,heart attack or death, among others.  The patient understands and wishes to proceed.     It was explained that if tissue has been repaired or reconstructed, there is also an increased chance of failure which may require further management.  Following the completion of the discussion, the patient expressed understanding of this planned course of care, all their questions were answered and consent will be obtained preoperatively.    Operative Plan: left [facility] Total Knee Replacement an overnight staywith home health rehab        Stan Kinsey MD  6/13/2019

## 2019-06-19 PROBLEM — M17.12 PRIMARY OSTEOARTHRITIS OF LEFT KNEE: Status: ACTIVE | Noted: 2019-06-19

## 2019-08-01 ENCOUNTER — APPOINTMENT (OUTPATIENT)
Dept: PREADMISSION TESTING | Facility: HOSPITAL | Age: 69
End: 2019-08-01

## 2019-08-01 VITALS
RESPIRATION RATE: 16 BRPM | OXYGEN SATURATION: 100 % | HEART RATE: 56 BPM | SYSTOLIC BLOOD PRESSURE: 119 MMHG | TEMPERATURE: 97.5 F | WEIGHT: 211 LBS | BODY MASS INDEX: 36.02 KG/M2 | DIASTOLIC BLOOD PRESSURE: 76 MMHG | HEIGHT: 64 IN

## 2019-08-01 DIAGNOSIS — M17.12 PRIMARY OSTEOARTHRITIS OF LEFT KNEE: ICD-10-CM

## 2019-08-01 LAB
ANION GAP SERPL CALCULATED.3IONS-SCNC: 11.7 MMOL/L (ref 5–15)
BILIRUB UR QL STRIP: NEGATIVE
BUN BLD-MCNC: 14 MG/DL (ref 8–23)
BUN/CREAT SERPL: 16.1 (ref 7–25)
CALCIUM SPEC-SCNC: 9.8 MG/DL (ref 8.6–10.5)
CHLORIDE SERPL-SCNC: 105 MMOL/L (ref 98–107)
CLARITY UR: CLEAR
CO2 SERPL-SCNC: 26.3 MMOL/L (ref 22–29)
COLOR UR: YELLOW
CREAT BLD-MCNC: 0.87 MG/DL (ref 0.57–1)
DEPRECATED RDW RBC AUTO: 42.6 FL (ref 37–54)
ERYTHROCYTE [DISTWIDTH] IN BLOOD BY AUTOMATED COUNT: 13.1 % (ref 12.3–15.4)
GFR SERPL CREATININE-BSD FRML MDRD: 65 ML/MIN/1.73
GLUCOSE BLD-MCNC: 96 MG/DL (ref 65–99)
GLUCOSE UR STRIP-MCNC: NEGATIVE MG/DL
HCT VFR BLD AUTO: 42.8 % (ref 34–46.6)
HGB BLD-MCNC: 13.7 G/DL (ref 12–15.9)
HGB UR QL STRIP.AUTO: NEGATIVE
KETONES UR QL STRIP: NEGATIVE
LEUKOCYTE ESTERASE UR QL STRIP.AUTO: NEGATIVE
MCH RBC QN AUTO: 28.5 PG (ref 26.6–33)
MCHC RBC AUTO-ENTMCNC: 32 G/DL (ref 31.5–35.7)
MCV RBC AUTO: 89.2 FL (ref 79–97)
NITRITE UR QL STRIP: NEGATIVE
PH UR STRIP.AUTO: 6 [PH] (ref 5–8)
PLATELET # BLD AUTO: 206 10*3/MM3 (ref 140–450)
PMV BLD AUTO: 11.8 FL (ref 6–12)
POTASSIUM BLD-SCNC: 4.6 MMOL/L (ref 3.5–5.2)
PROT UR QL STRIP: NEGATIVE
RBC # BLD AUTO: 4.8 10*6/MM3 (ref 3.77–5.28)
SODIUM BLD-SCNC: 143 MMOL/L (ref 136–145)
SP GR UR STRIP: 1.01 (ref 1–1.03)
UROBILINOGEN UR QL STRIP: NORMAL
WBC NRBC COR # BLD: 7.67 10*3/MM3 (ref 3.4–10.8)

## 2019-08-01 PROCEDURE — 93010 ELECTROCARDIOGRAM REPORT: CPT | Performed by: INTERNAL MEDICINE

## 2019-08-01 PROCEDURE — 36415 COLL VENOUS BLD VENIPUNCTURE: CPT

## 2019-08-01 PROCEDURE — 80048 BASIC METABOLIC PNL TOTAL CA: CPT | Performed by: ORTHOPAEDIC SURGERY

## 2019-08-01 PROCEDURE — 81003 URINALYSIS AUTO W/O SCOPE: CPT | Performed by: ORTHOPAEDIC SURGERY

## 2019-08-01 PROCEDURE — 93005 ELECTROCARDIOGRAM TRACING: CPT

## 2019-08-01 PROCEDURE — 85027 COMPLETE CBC AUTOMATED: CPT | Performed by: ORTHOPAEDIC SURGERY

## 2019-08-01 RX ORDER — BUPROPION HYDROCHLORIDE 150 MG/1
150 TABLET ORAL 2 TIMES DAILY
COMMUNITY
End: 2020-08-11

## 2019-08-01 RX ORDER — CHLORHEXIDINE GLUCONATE 500 MG/1
CLOTH TOPICAL
COMMUNITY
End: 2019-08-15 | Stop reason: HOSPADM

## 2019-08-01 ASSESSMENT — KOOS JR
KOOS JR SCORE: 10
KOOS JR SCORE: 61.583

## 2019-08-01 NOTE — DISCHARGE INSTRUCTIONS
Take the following medications the morning of surgery with a small sip of water:    LEVOTHYROXINE,  CYTOMEL AND WELLBUTRIN    TIME OF ARRIVAL WILL BE GIVEN TO YOU BY THE OFFICE    General Instructions:  • Do not eat solid food after midnight the night before surgery.  • You may drink clear liquids day of surgery but must stop at least one hour before your hospital arrival time.  • It is beneficial for you to have a clear drink that contains carbohydrates the day of surgery.  We suggest a 12 to 20 ounce bottle of Gatorade or Powerade for non-diabetic patients or a 12 to 20 ounce bottle of G2 or Powerade Zero for diabetic patients. (Pediatric patients, are not advised to drink a 12 to 20 ounce carbohydrate drink)    Clear liquids are liquids you can see through.  Nothing red in color.     Plain water                               Sports drinks  Sodas                                   Gelatin (Jell-O)  Fruit juices without pulp such as white grape juice and apple juice  Popsicles that contain no fruit or yogurt  Tea or coffee (no cream or milk added)  Gatorade / Powerade  G2 / Powerade Zero    • Infants may have breast milk up to four hours before surgery.  • Infants drinking formula may drink formula up to six hours before surgery.   • Patients who avoid smoking, chewing tobacco and alcohol for 4 weeks prior to surgery have a reduced risk of post-operative complications.  Quit smoking as many days before surgery as you can.  • Do not smoke, use chewing tobacco or drink alcohol the day of surgery.   • If applicable bring your C-PAP/ BI-PAP machine.  • Bring any papers given to you in the doctor’s office.  • Wear clean comfortable clothes and socks.  • Do not wear contact lenses, false eyelashes or make-up.  Bring a case for your glasses.   • Bring crutches or walker if applicable.  • Remove all piercings.  Leave jewelry and any other valuables at home.  • Hair extensions with metal clips must be removed prior to  surgery.  • The Pre-Admission Testing nurse will instruct you to bring medications if unable to obtain an accurate list in Pre-Admission Testing.        If you were given a blood bank ID arm band remember to bring it with you the day of surgery.    Preventing a Surgical Site Infection:  • For 2 to 3 days before surgery, avoid shaving with a razor because the razor can irritate skin and make it easier to develop an infection.    • Any areas of open skin can increase the risk of a post-operative wound infection by allowing bacteria to enter and travel throughout the body.  Notify your surgeon if you have any skin wounds / rashes even if it is not near the expected surgical site.  The area will need assessed to determine if surgery should be delayed until it is healed.  • The night prior to surgery sleep in a clean bed with clean clothing.  Do not allow pets to sleep with you.  • Shower on the morning of surgery using a fresh bar of anti-bacterial soap (such as Dial) and clean washcloth.  Dry with a clean towel and dress in clean clothing.  • Ask your surgeon if you will be receiving antibiotics prior to surgery.  • Make sure you, your family, and all healthcare providers clean their hands with soap and water or an alcohol based hand  before caring for you or your wound.    Day of surgery:  Upon arrival, a Pre-op nurse and Anesthesiologist will review your health history, obtain vital signs, and answer questions you may have.  The only belongings needed at this time will be a list of your home medications and if applicable your C-PAP/BI-PAP machine.  If you are staying overnight your family can leave the rest of your belongings in the car and bring them to your room later.  A Pre-op nurse will start an IV and you may receive medication in preparation for surgery, including something to help you relax.  Your family will be able to see you in the Pre-op area.  While you are in surgery your family should notify  the waiting room  if they leave the waiting room area and provide a contact phone number.    Please be aware that surgery does come with discomfort.  We want to make every effort to control your discomfort so please discuss any uncontrolled symptoms with your nurse.   Your doctor will most likely have prescribed pain medications.      If you are going home after surgery you will receive individualized written care instructions before being discharged.  A responsible adult must drive you to and from the hospital on the day of your surgery and stay with you for 24 hours.    If you are staying overnight following surgery, you will be transported to your hospital room following the recovery period.  [facility] has all private rooms.    You have received a list of surgical assistants for your reference.  If you have any questions please call Pre-Admission Testing at [phone].  Deductibles and co-payments are collected on the day of service. Please be prepared to pay the required co-pay, deductible or deposit on the day of service as defined by your plan.    2% CHLORAHEXIDINE GLUCONATE* CLOTH  Preparing or “prepping” skin before surgery can reduce the risk of infection at the surgical site. To make the process easier, [facility] has chosen disposable cloths moistened with a rinse-free, 2% Chlorhexidine Gluconate (CHG) antiseptic solution. The steps below outline the prepping process and should be carefully followed.        Use the prep cloth on the area that is circled in the diagram             Directions Night before Surgery  1) Shower using a fresh bar of anti-bacterial soap (such as Dial) and clean washcloth.  Use a clean towel to completely dry your skin.  2) Do not use any lotions, oils or creams on your skin.  3) Open the package and remove 1 cloth, wipe your skin for 30 seconds in a circular motion.  Allow to dry for 3 minutes.  4) Repeat #3 with second cloth.  5) Do not  touch your eyes, ears, or mouth with the prep cloth.  6) Allow the wet prep solution to air dry.  7) Discard the prep cloth and wash your hands with soap and water.   8) Dress in clean bed clothes and sleep on fresh clean bed sheets.   9) You may experience some temporary itching after the prep.    Directions Day of Surgery  1) Repeat steps 1,2,3,4,5,6,7, and 9.   2) Dress in clean clothes before coming to the hospital.    BACTROBAN NASAL OINTMENT  There are many germs normally in your nose. Bactroban is an ointment that will help reduce these germs. Please follow these instructions for Bactroban use:      ____The day before surgery in the morning  Date________    ____The day before surgery in the evening              Date________    ____The day of surgery in the morning    Date________    **Squirt ½ package of Bactroban Ointment onto a cotton applicator and apply to inside of 1st nostril.  Squirt the remaining Bactroban and apply to the inside of the other nostril.    PERIDEX- ORAL:  Use only if your surgeon has ordered  Use the night before and morning of surgery - Swish, gargle, and spit - do not swallow.

## 2019-08-02 NOTE — PROGRESS NOTES
Pre Op Ortho Assessment    [facility]     Patient Name: Hellen Delgado  MRN: [id number]  Today's Date: 8/2/2019        PRE-OPERATIVE ORTHOPEDIC ASSESSMENT     Row Name 08/02/19 1531       Discharge Disposition/Planning:    Outpatient Rehabilitation Facility of Choice:  other *see comment    Home Health Services Preferred:  Other *see comment    Row Name 08/02/19 1525       Question    What is your age group?  1    Gender?  1    How far on average can you walk? (a block is 200 meters)  1    Which gait aid do you use? (more often than not)  2    Do you use community supports: (home help, meals on wheels, district nursing)  1    Will you live with someone who can care for you after your operation?  3    Your Score (out of 12)  9       Discharge Disposition/Planning:    Discussed the predicted discharge disposition needed based on RAPT Assessment with the patient  Yes    Patient Selected Discharge Disposition:  Home    Outpatient Rehabilitation Facility of Choice:  -- [facility]    Home Health Services Preferred:  -- [facility]    Post-operative Caregiver Name and Phone Number  Aracely WATT - [phone]       Home Equipment    Does patient have a walker for home use?  No    Does patient have a 3 in 1 commode for home use?  Yes    Does patient have a shower chair for home use?  No    Does patient have an elevated commode seat for home use?  No    Does patient have a cane for home use?  No    Is there any other medical equipment in the home?  No       Pre-Operative Class Attendance    Attended or scheduled to attend the pre-operative class within 1 year of total joint replacement?  Yes       Patient Education    Expected time of discharge discussed  Yes    Encouraged to attend pre-operative class  Yes    Education regarding predicted discharge disposition based on RAPT score  Yes    Patient receptive and voiced understanding of information given  Yes            Gordy Ha LPN

## 2019-08-08 ENCOUNTER — OFFICE VISIT (OUTPATIENT)
Dept: ORTHOPEDIC SURGERY | Facility: CLINIC | Age: 69
End: 2019-08-08

## 2019-08-08 VITALS
TEMPERATURE: 98 F | BODY MASS INDEX: 36.02 KG/M2 | SYSTOLIC BLOOD PRESSURE: 116 MMHG | DIASTOLIC BLOOD PRESSURE: 66 MMHG | WEIGHT: 211 LBS | HEIGHT: 64 IN

## 2019-08-08 DIAGNOSIS — M17.12 PRIMARY OSTEOARTHRITIS OF LEFT KNEE: Primary | ICD-10-CM

## 2019-08-08 PROCEDURE — 77077 JOINT SURVEY SINGLE VIEW: CPT | Performed by: NURSE PRACTITIONER

## 2019-08-08 PROCEDURE — S0260 H&P FOR SURGERY: HCPCS | Performed by: NURSE PRACTITIONER

## 2019-08-08 NOTE — H&P
"Patient: Hellen Delgado    Date of Admission: 8/14/19    YOB: 1950    Medical Record Number: [id number]    Admitting Physician: Dr. Stan Kinsey    Reason for Admission: End Stage Left Knee OA    History of Present Illness: 68 y.o. female presents with severe end stage knee osteoarthritis which has not been responsive to the full compliment of conservative measures. Despite conservative attempts, there is still severe, constant activity limiting pain. Given the severity of the pain, the patient has elected to proceed with knee replacement.    Allergies:   Allergies   Allergen Reactions   • Gabapentin      MIGRAINE   • Telithromycin      PALPITATIONS LIVER ABNORMALITY   • Cephalexin Palpitations and Rash   • Tegaderm Ag Mesh 2\"X2\" [Wound Dressings] Rash   • Thyroid Rash     [employer] thyroid         Current Medications:  Home Medications:    Current Outpatient Medications on File Prior to Visit   Medication Sig   • atorvastatin (LIPITOR) 40 MG tablet Take 40 mg by mouth Every Night.   • buPROPion XL (WELLBUTRIN XL) 150 MG 24 hr tablet Take 150 mg by mouth 2 (Two) Times a Day.   • Chlorhexidine Gluconate Cloth 2 % pads Apply  topically. As directed pre op   • Coenzyme Q10 10 MG capsule Take 10 mg by mouth Every Night. TO HOLD 1 WEEK BEFORE SURGERY   • conjugated estrogens (PREMARIN) 0.625 MG/GM vaginal cream Insert 0.625 g into the vagina 2 (Two) Times a Week.   • fexofenadine (ALLEGRA) 180 MG tablet Take 180 mg by mouth As Needed.   • hydrocortisone 2.5 % ointment Apply 1 application topically to the appropriate area as directed Daily As Needed.   • ibuprofen (ADVIL) 200 MG tablet Take 600 mg by mouth Every 6 (Six) Hours As Needed for Mild Pain . TO HOLD 1 WEEK BEFORE SURGERY   • lansoprazole (PREVACID) 30 MG capsule Take 30 mg by mouth As Needed.   • levothyroxine (SYNTHROID, LEVOTHROID) 137 MCG tablet Take 137 mcg by mouth Every Morning.   • liothyronine (CYTOMEL) 25 MCG tablet Take 12.5 mcg by mouth " Every Morning.   • Multiple Vitamins-Minerals (MULTIVITAMIN ADULT PO) Take 1 tablet by mouth Every Morning. TO HOLD 1 WEEK BEFORE SURGERY   • mupirocin (BACTROBAN) 2 % nasal ointment into the nostril(s) as directed by provider. As directed pre op   • naproxen sodium (ALEVE) 220 MG tablet Take 220 mg by mouth 2 (Two) Times a Day As Needed for Mild Pain . TO HOLD 1 WEEK BEFORE SURGERY   • SUMAtriptan (IMITREX) 50 MG tablet Take one tablet at onset of headache. May repeat dose one time in 2 hours if headache not relieved.   • triamcinolone (KENALOG) 0.1 % cream Apply 1 application topically to the appropriate area as directed 2 (Two) Times a Day As Needed. PRN   • vitamin B-12 (CYANOCOBALAMIN) 1000 MCG tablet Take 1,000 mcg by mouth Daily.   • vitamin C (ASCORBIC ACID) 250 MG tablet Take 250 mg by mouth Daily.     Current Facility-Administered Medications on File Prior to Visit   Medication   • mupirocin (BACTROBAN) 2 % nasal ointment     PRN Meds:.    PMH:     Past Medical History:   Diagnosis Date   • Anxiety    • Arthritis    • Bronchitis    • Depression    • Disease of thyroid gland     underactive thyroid   • GERD (gastroesophageal reflux disease)    • Hypercholesteremia    • Left knee pain    • Migraines    • Osteopenia    • PONV (postoperative nausea and vomiting)    • Sleep apnea     CPAP   • Vertigo        PF/Surg/Soc Hx:     Past Surgical History:   Procedure Laterality Date   • BACK SURGERY  04/2008   • FOREIGN BODY REMOVAL      LEFT FOOT   • GALLBLADDER SURGERY     • HYSTERECTOMY     • KNEE ARTHROSCOPY  12/1988   • KNEE ARTHROSCOPY Left 12/13/2016    Procedure: LEFT KNEE ARTHROSCOPY AND PARTIAL MEDIAL AND LATERAL MENISECTOMIES; DEBRIDEMENT ARTHRITIS;  Surgeon: Starr Thomas MD;  Location: [facility] OR [facility] [place];  Service:    • THORACIC SPINE SURGERY  07/2001        Social History     Occupational History   • Not on file   Tobacco Use   • Smoking status: Never Smoker   • Smokeless tobacco: Never Used   Substance  "and Sexual Activity   • Alcohol use: No     Comment: quit over a year ago   • Drug use: No   • Sexual activity: Defer      Social History     Social History Narrative   • Not on file        Family History   Problem Relation Age of Onset   • Colon cancer Other    • Lung disease Other    • Heart disease Other    • Clotting disorder Other    • Hypertension Other          Review of Systems:   A 14 point review of systems was performed, pertinent positives discussed above, all other systems are negative    Physical Exam: 68 y.o. female  Vital Signs :   Vitals:    08/08/19 1344   BP: 116/66   Temp: 98 °F (36.7 °C)   Weight: 95.7 kg (211 lb)   Height: 162.6 cm (64\")     General: Alert and Oriented x 3, No acute distress.  Psych: mood and affect appropriate; recent and remote memory intact  Eyes: conjunctiva clear; pupils equally round and reactive, sclera nonicteric  CV: no peripheral edema  Resp: normal respiratory effort  Skin: no rashes or wounds; normal turgor  Musculosketetal; pain and crepitance with knee range of motion, 0-112  Vascular: palpable distal pulses    Xrays:  -3 views (AP, lateral, and sunrise) were reviewed demonstrating end-stage OA with bone on bone articulation.  -A full length AP xray was ordered and reviewed today for purposes of operative alignment demonstrating end stage arthritic findings. There are no previous full length films for review    Assessment:  End-stage Left knee osteoarthritis. Conservative measures have failed.      Plan:  The plan is to proceed with Left Total Knee Replacement. The patient voiced understanding of the risks, benefits, and alternative forms of treatment that were discussed with Dr Kinsey at the time of scheduling.  Patient's plan on going home with home health next day    Erica Marmolejo, APRN  8/8/2019        "

## 2019-08-08 NOTE — H&P (VIEW-ONLY)
"Patient: Hellen Delgado    Date of Admission: 8/14/19    YOB: 1950    Medical Record Number: [id number]    Admitting Physician: Dr. Stan Kinsey    Reason for Admission: End Stage Left Knee OA    History of Present Illness: 68 y.o. female presents with severe end stage knee osteoarthritis which has not been responsive to the full compliment of conservative measures. Despite conservative attempts, there is still severe, constant activity limiting pain. Given the severity of the pain, the patient has elected to proceed with knee replacement.    Allergies:   Allergies   Allergen Reactions   • Gabapentin      MIGRAINE   • Telithromycin      PALPITATIONS LIVER ABNORMALITY   • Cephalexin Palpitations and Rash   • Tegaderm Ag Mesh 2\"X2\" [Wound Dressings] Rash   • Thyroid Rash     [employer] thyroid         Current Medications:  Home Medications:    Current Outpatient Medications on File Prior to Visit   Medication Sig   • atorvastatin (LIPITOR) 40 MG tablet Take 40 mg by mouth Every Night.   • buPROPion XL (WELLBUTRIN XL) 150 MG 24 hr tablet Take 150 mg by mouth 2 (Two) Times a Day.   • Chlorhexidine Gluconate Cloth 2 % pads Apply  topically. As directed pre op   • Coenzyme Q10 10 MG capsule Take 10 mg by mouth Every Night. TO HOLD 1 WEEK BEFORE SURGERY   • conjugated estrogens (PREMARIN) 0.625 MG/GM vaginal cream Insert 0.625 g into the vagina 2 (Two) Times a Week.   • fexofenadine (ALLEGRA) 180 MG tablet Take 180 mg by mouth As Needed.   • hydrocortisone 2.5 % ointment Apply 1 application topically to the appropriate area as directed Daily As Needed.   • ibuprofen (ADVIL) 200 MG tablet Take 600 mg by mouth Every 6 (Six) Hours As Needed for Mild Pain . TO HOLD 1 WEEK BEFORE SURGERY   • lansoprazole (PREVACID) 30 MG capsule Take 30 mg by mouth As Needed.   • levothyroxine (SYNTHROID, LEVOTHROID) 137 MCG tablet Take 137 mcg by mouth Every Morning.   • liothyronine (CYTOMEL) 25 MCG tablet Take 12.5 mcg by mouth " Every Morning.   • Multiple Vitamins-Minerals (MULTIVITAMIN ADULT PO) Take 1 tablet by mouth Every Morning. TO HOLD 1 WEEK BEFORE SURGERY   • mupirocin (BACTROBAN) 2 % nasal ointment into the nostril(s) as directed by provider. As directed pre op   • naproxen sodium (ALEVE) 220 MG tablet Take 220 mg by mouth 2 (Two) Times a Day As Needed for Mild Pain . TO HOLD 1 WEEK BEFORE SURGERY   • SUMAtriptan (IMITREX) 50 MG tablet Take one tablet at onset of headache. May repeat dose one time in 2 hours if headache not relieved.   • triamcinolone (KENALOG) 0.1 % cream Apply 1 application topically to the appropriate area as directed 2 (Two) Times a Day As Needed. PRN   • vitamin B-12 (CYANOCOBALAMIN) 1000 MCG tablet Take 1,000 mcg by mouth Daily.   • vitamin C (ASCORBIC ACID) 250 MG tablet Take 250 mg by mouth Daily.     Current Facility-Administered Medications on File Prior to Visit   Medication   • mupirocin (BACTROBAN) 2 % nasal ointment     PRN Meds:.    PMH:     Past Medical History:   Diagnosis Date   • Anxiety    • Arthritis    • Bronchitis    • Depression    • Disease of thyroid gland     underactive thyroid   • GERD (gastroesophageal reflux disease)    • Hypercholesteremia    • Left knee pain    • Migraines    • Osteopenia    • PONV (postoperative nausea and vomiting)    • Sleep apnea     CPAP   • Vertigo        PF/Surg/Soc Hx:     Past Surgical History:   Procedure Laterality Date   • BACK SURGERY  04/2008   • FOREIGN BODY REMOVAL      LEFT FOOT   • GALLBLADDER SURGERY     • HYSTERECTOMY     • KNEE ARTHROSCOPY  12/1988   • KNEE ARTHROSCOPY Left 12/13/2016    Procedure: LEFT KNEE ARTHROSCOPY AND PARTIAL MEDIAL AND LATERAL MENISECTOMIES; DEBRIDEMENT ARTHRITIS;  Surgeon: Starr Thomas MD;  Location: [facility] [place];  Service:    • THORACIC SPINE SURGERY  07/2001        Social History     Occupational History   • Not on file   Tobacco Use   • Smoking status: Never Smoker   • Smokeless tobacco: Never Used   Substance  "and Sexual Activity   • Alcohol use: No     Comment: quit over a year ago   • Drug use: No   • Sexual activity: Defer      Social History     Social History Narrative   • Not on file        Family History   Problem Relation Age of Onset   • Colon cancer Other    • Lung disease Other    • Heart disease Other    • Clotting disorder Other    • Hypertension Other          Review of Systems:   A 14 point review of systems was performed, pertinent positives discussed above, all other systems are negative    Physical Exam: 68 y.o. female  Vital Signs :   Vitals:    08/08/19 1344   BP: 116/66   Temp: 98 °F (36.7 °C)   Weight: 95.7 kg (211 lb)   Height: 162.6 cm (64\")     General: Alert and Oriented x 3, No acute distress.  Psych: mood and affect appropriate; recent and remote memory intact  Eyes: conjunctiva clear; pupils equally round and reactive, sclera nonicteric  CV: no peripheral edema  Resp: normal respiratory effort  Skin: no rashes or wounds; normal turgor  Musculosketetal; pain and crepitance with knee range of motion, 0-112  Vascular: palpable distal pulses    Xrays:  -3 views (AP, lateral, and sunrise) were reviewed demonstrating end-stage OA with bone on bone articulation.  -A full length AP xray was ordered and reviewed today for purposes of operative alignment demonstrating end stage arthritic findings. There are no previous full length films for review    Assessment:  End-stage Left knee osteoarthritis. Conservative measures have failed.      Plan:  The plan is to proceed with Left Total Knee Replacement. The patient voiced understanding of the risks, benefits, and alternative forms of treatment that were discussed with Dr Kinsey at the time of scheduling.  Patient's plan on going home with home health next day    Erica Marmolejo, APRN  8/8/2019        "

## 2019-08-14 ENCOUNTER — ANESTHESIA EVENT (OUTPATIENT)
Dept: PERIOP | Facility: HOSPITAL | Age: 69
End: 2019-08-14

## 2019-08-14 ENCOUNTER — ANESTHESIA (OUTPATIENT)
Dept: PERIOP | Facility: HOSPITAL | Age: 69
End: 2019-08-14

## 2019-08-14 ENCOUNTER — HOSPITAL ENCOUNTER (INPATIENT)
Facility: HOSPITAL | Age: 69
LOS: 1 days | Discharge: HOME HEALTH | End: 2019-08-15
Attending: ORTHOPAEDIC SURGERY | Admitting: ORTHOPAEDIC SURGERY

## 2019-08-14 ENCOUNTER — APPOINTMENT (OUTPATIENT)
Dept: GENERAL RADIOLOGY | Facility: HOSPITAL | Age: 69
End: 2019-08-14

## 2019-08-14 DIAGNOSIS — M17.12 PRIMARY OSTEOARTHRITIS OF LEFT KNEE: ICD-10-CM

## 2019-08-14 PROBLEM — G89.29 CHRONIC PAIN OF LEFT KNEE: Status: ACTIVE | Noted: 2019-08-14

## 2019-08-14 PROBLEM — M25.562 CHRONIC PAIN OF LEFT KNEE: Status: ACTIVE | Noted: 2019-08-14

## 2019-08-14 PROCEDURE — 73560 X-RAY EXAM OF KNEE 1 OR 2: CPT

## 2019-08-14 PROCEDURE — C1713 ANCHOR/SCREW BN/BN,TIS/BN: HCPCS | Performed by: ORTHOPAEDIC SURGERY

## 2019-08-14 PROCEDURE — C9290 INJ, BUPIVACAINE LIPOSOME: HCPCS | Performed by: ORTHOPAEDIC SURGERY

## 2019-08-14 PROCEDURE — C1776 JOINT DEVICE (IMPLANTABLE): HCPCS | Performed by: ORTHOPAEDIC SURGERY

## 2019-08-14 PROCEDURE — 27447 TOTAL KNEE ARTHROPLASTY: CPT | Performed by: ORTHOPAEDIC SURGERY

## 2019-08-14 PROCEDURE — 97110 THERAPEUTIC EXERCISES: CPT

## 2019-08-14 PROCEDURE — 25010000003 BUPIVACAINE LIPOSOME 1.3 % SUSPENSION 20 ML VIAL: Performed by: ORTHOPAEDIC SURGERY

## 2019-08-14 PROCEDURE — 25010000002 DEXAMETHASONE PER 1 MG: Performed by: NURSE ANESTHETIST, CERTIFIED REGISTERED

## 2019-08-14 PROCEDURE — 25010000002 VANCOMYCIN 10 G RECONSTITUTED SOLUTION: Performed by: ORTHOPAEDIC SURGERY

## 2019-08-14 PROCEDURE — 97162 PT EVAL MOD COMPLEX 30 MIN: CPT

## 2019-08-14 PROCEDURE — 25010000002 MIDAZOLAM PER 1 MG: Performed by: ANESTHESIOLOGY

## 2019-08-14 PROCEDURE — 25010000002 KETOROLAC TROMETHAMINE PER 15 MG: Performed by: ORTHOPAEDIC SURGERY

## 2019-08-14 PROCEDURE — 25010000002 PHENYLEPHRINE PER 1 ML: Performed by: NURSE ANESTHETIST, CERTIFIED REGISTERED

## 2019-08-14 PROCEDURE — 25010000002 PROPOFOL 10 MG/ML EMULSION: Performed by: NURSE ANESTHETIST, CERTIFIED REGISTERED

## 2019-08-14 PROCEDURE — 0SRD0J9 REPLACEMENT OF LEFT KNEE JOINT WITH SYNTHETIC SUBSTITUTE, CEMENTED, OPEN APPROACH: ICD-10-PCS | Performed by: ORTHOPAEDIC SURGERY

## 2019-08-14 DEVICE — GENESIS II CRUCIATE RETAINING DEEP                                    FLEXION INSERT SIZE 3-4 9MM
Type: IMPLANTABLE DEVICE | Site: KNEE | Status: FUNCTIONAL
Brand: GENESIS II

## 2019-08-14 DEVICE — GENESIS II BICONVEX PATELLA 29MM
Type: IMPLANTABLE DEVICE | Site: KNEE | Status: FUNCTIONAL
Brand: GENESIS II

## 2019-08-14 DEVICE — GENESIS II NON-POROUS TIBIAL                                    BASEPLATE SIZE 3 LEFT
Type: IMPLANTABLE DEVICE | Site: KNEE | Status: FUNCTIONAL
Brand: GENESIS II

## 2019-08-14 DEVICE — PALACOS® R IS A FAST-CURING, RADIOPAQUE, POLY(METHYL METHACRYLATE)-BASED BONE CEMENT.PALACOS ® R CONTAINS THE X-RAY CONTRAST MEDIUM ZIRCONIUM DIOXIDE. TO IMPROVE VISIBILITY IN THE SURGICAL FIELD PALACOS ® R HAS BEEN COLOURED WITH CHLOROPHYLL (E141). THE BONE CEMENT IS PREPARED DIRECTLY BEFORE USE BY MIXING A POLYMER POWDER COMPONENT WITH A LIQUID MONOMER COMPONENT. A DUCTILE DOUGH FORMS WHICH CURES WITHIN A FEW MINUTES.
Type: IMPLANTABLE DEVICE | Site: KNEE | Status: FUNCTIONAL
Brand: PALACOS®

## 2019-08-14 DEVICE — LEGION CRUCIATE RETAINING OXINIUM                                    FEMORAL SIZE 4 LEFT
Type: IMPLANTABLE DEVICE | Site: KNEE | Status: FUNCTIONAL
Brand: LEGION

## 2019-08-14 DEVICE — IMPLANTABLE DEVICE: Type: IMPLANTABLE DEVICE | Site: KNEE | Status: FUNCTIONAL

## 2019-08-14 RX ORDER — EPHEDRINE SULFATE 50 MG/ML
5 INJECTION, SOLUTION INTRAVENOUS ONCE AS NEEDED
Status: DISCONTINUED | OUTPATIENT
Start: 2019-08-14 | End: 2019-08-14 | Stop reason: HOSPADM

## 2019-08-14 RX ORDER — PROMETHAZINE HYDROCHLORIDE 25 MG/ML
12.5 INJECTION, SOLUTION INTRAMUSCULAR; INTRAVENOUS ONCE AS NEEDED
Status: DISCONTINUED | OUTPATIENT
Start: 2019-08-14 | End: 2019-08-14 | Stop reason: HOSPADM

## 2019-08-14 RX ORDER — HYDROCODONE BITARTRATE AND ACETAMINOPHEN 7.5; 325 MG/1; MG/1
2 TABLET ORAL EVERY 4 HOURS PRN
Status: DISCONTINUED | OUTPATIENT
Start: 2019-08-14 | End: 2019-08-15 | Stop reason: HOSPADM

## 2019-08-14 RX ORDER — NALOXONE HCL 0.4 MG/ML
0.2 VIAL (ML) INJECTION AS NEEDED
Status: DISCONTINUED | OUTPATIENT
Start: 2019-08-14 | End: 2019-08-14 | Stop reason: HOSPADM

## 2019-08-14 RX ORDER — PROMETHAZINE HYDROCHLORIDE 25 MG/ML
6.25 INJECTION, SOLUTION INTRAMUSCULAR; INTRAVENOUS
Status: DISCONTINUED | OUTPATIENT
Start: 2019-08-14 | End: 2019-08-14 | Stop reason: HOSPADM

## 2019-08-14 RX ORDER — CLINDAMYCIN PHOSPHATE 900 MG/50ML
900 INJECTION INTRAVENOUS ONCE
Status: COMPLETED | OUTPATIENT
Start: 2019-08-14 | End: 2019-08-14

## 2019-08-14 RX ORDER — ACETAMINOPHEN 325 MG/1
650 TABLET ORAL ONCE AS NEEDED
Status: DISCONTINUED | OUTPATIENT
Start: 2019-08-14 | End: 2019-08-14 | Stop reason: HOSPADM

## 2019-08-14 RX ORDER — DEXAMETHASONE SODIUM PHOSPHATE 4 MG/ML
INJECTION, SOLUTION INTRA-ARTICULAR; INTRALESIONAL; INTRAMUSCULAR; INTRAVENOUS; SOFT TISSUE AS NEEDED
Status: DISCONTINUED | OUTPATIENT
Start: 2019-08-14 | End: 2019-08-14 | Stop reason: SURG

## 2019-08-14 RX ORDER — OXYCODONE AND ACETAMINOPHEN 7.5; 325 MG/1; MG/1
1 TABLET ORAL ONCE AS NEEDED
Status: DISCONTINUED | OUTPATIENT
Start: 2019-08-14 | End: 2019-08-14 | Stop reason: HOSPADM

## 2019-08-14 RX ORDER — HYDROCODONE BITARTRATE AND ACETAMINOPHEN 7.5; 325 MG/1; MG/1
1 TABLET ORAL ONCE AS NEEDED
Status: DISCONTINUED | OUTPATIENT
Start: 2019-08-14 | End: 2019-08-14 | Stop reason: HOSPADM

## 2019-08-14 RX ORDER — MAGNESIUM HYDROXIDE 1200 MG/15ML
LIQUID ORAL AS NEEDED
Status: DISCONTINUED | OUTPATIENT
Start: 2019-08-14 | End: 2019-08-14 | Stop reason: HOSPADM

## 2019-08-14 RX ORDER — MIDAZOLAM HYDROCHLORIDE 1 MG/ML
2 INJECTION INTRAMUSCULAR; INTRAVENOUS
Status: DISCONTINUED | OUTPATIENT
Start: 2019-08-14 | End: 2019-08-14 | Stop reason: HOSPADM

## 2019-08-14 RX ORDER — DIPHENHYDRAMINE HCL 25 MG
25 CAPSULE ORAL
Status: DISCONTINUED | OUTPATIENT
Start: 2019-08-14 | End: 2019-08-14 | Stop reason: HOSPADM

## 2019-08-14 RX ORDER — FENTANYL CITRATE 50 UG/ML
50 INJECTION, SOLUTION INTRAMUSCULAR; INTRAVENOUS
Status: DISCONTINUED | OUTPATIENT
Start: 2019-08-14 | End: 2019-08-14 | Stop reason: HOSPADM

## 2019-08-14 RX ORDER — MELOXICAM 15 MG/1
15 TABLET ORAL ONCE
Status: COMPLETED | OUTPATIENT
Start: 2019-08-14 | End: 2019-08-14

## 2019-08-14 RX ORDER — UREA 10 %
3 LOTION (ML) TOPICAL NIGHTLY PRN
Status: DISCONTINUED | OUTPATIENT
Start: 2019-08-14 | End: 2019-08-15 | Stop reason: HOSPADM

## 2019-08-14 RX ORDER — ACETAMINOPHEN 10 MG/ML
1000 INJECTION, SOLUTION INTRAVENOUS ONCE
Status: COMPLETED | OUTPATIENT
Start: 2019-08-14 | End: 2019-08-14

## 2019-08-14 RX ORDER — PROMETHAZINE HYDROCHLORIDE 25 MG/1
25 TABLET ORAL ONCE AS NEEDED
Status: DISCONTINUED | OUTPATIENT
Start: 2019-08-14 | End: 2019-08-14 | Stop reason: HOSPADM

## 2019-08-14 RX ORDER — PREGABALIN 75 MG/1
150 CAPSULE ORAL ONCE
Status: COMPLETED | OUTPATIENT
Start: 2019-08-14 | End: 2019-08-14

## 2019-08-14 RX ORDER — LIDOCAINE HYDROCHLORIDE 10 MG/ML
0.5 INJECTION, SOLUTION EPIDURAL; INFILTRATION; INTRACAUDAL; PERINEURAL ONCE AS NEEDED
Status: DISCONTINUED | OUTPATIENT
Start: 2019-08-14 | End: 2019-08-14 | Stop reason: HOSPADM

## 2019-08-14 RX ORDER — ACETAMINOPHEN 325 MG/1
325 TABLET ORAL EVERY 4 HOURS PRN
Status: DISCONTINUED | OUTPATIENT
Start: 2019-08-14 | End: 2019-08-15 | Stop reason: HOSPADM

## 2019-08-14 RX ORDER — WOUND DRESSING ADHESIVE - LIQUID
LIQUID MISCELLANEOUS AS NEEDED
Status: DISCONTINUED | OUTPATIENT
Start: 2019-08-14 | End: 2019-08-14 | Stop reason: HOSPADM

## 2019-08-14 RX ORDER — ASPIRIN 325 MG
325 TABLET, DELAYED RELEASE (ENTERIC COATED) ORAL DAILY
Status: DISCONTINUED | OUTPATIENT
Start: 2019-08-14 | End: 2019-08-15 | Stop reason: HOSPADM

## 2019-08-14 RX ORDER — DOCUSATE SODIUM 100 MG/1
100 CAPSULE, LIQUID FILLED ORAL 2 TIMES DAILY
Status: DISCONTINUED | OUTPATIENT
Start: 2019-08-14 | End: 2019-08-15 | Stop reason: HOSPADM

## 2019-08-14 RX ORDER — SODIUM CHLORIDE 0.9 % (FLUSH) 0.9 %
1-10 SYRINGE (ML) INJECTION AS NEEDED
Status: DISCONTINUED | OUTPATIENT
Start: 2019-08-14 | End: 2019-08-14 | Stop reason: HOSPADM

## 2019-08-14 RX ORDER — HYDROCODONE BITARTRATE AND ACETAMINOPHEN 7.5; 325 MG/1; MG/1
1 TABLET ORAL EVERY 4 HOURS PRN
Status: DISCONTINUED | OUTPATIENT
Start: 2019-08-14 | End: 2019-08-15 | Stop reason: HOSPADM

## 2019-08-14 RX ORDER — SODIUM CHLORIDE, SODIUM LACTATE, POTASSIUM CHLORIDE, CALCIUM CHLORIDE 600; 310; 30; 20 MG/100ML; MG/100ML; MG/100ML; MG/100ML
9 INJECTION, SOLUTION INTRAVENOUS CONTINUOUS
Status: DISCONTINUED | OUTPATIENT
Start: 2019-08-14 | End: 2019-08-14 | Stop reason: HOSPADM

## 2019-08-14 RX ORDER — EPHEDRINE SULFATE 50 MG/ML
INJECTION, SOLUTION INTRAVENOUS AS NEEDED
Status: DISCONTINUED | OUTPATIENT
Start: 2019-08-14 | End: 2019-08-14 | Stop reason: SURG

## 2019-08-14 RX ORDER — LIDOCAINE HYDROCHLORIDE 20 MG/ML
INJECTION, SOLUTION INFILTRATION; PERINEURAL AS NEEDED
Status: DISCONTINUED | OUTPATIENT
Start: 2019-08-14 | End: 2019-08-14 | Stop reason: SURG

## 2019-08-14 RX ORDER — KETOROLAC TROMETHAMINE 30 MG/ML
30 INJECTION, SOLUTION INTRAMUSCULAR; INTRAVENOUS EVERY 8 HOURS
Status: DISCONTINUED | OUTPATIENT
Start: 2019-08-14 | End: 2019-08-15 | Stop reason: HOSPADM

## 2019-08-14 RX ORDER — FLUMAZENIL 0.1 MG/ML
0.2 INJECTION INTRAVENOUS AS NEEDED
Status: DISCONTINUED | OUTPATIENT
Start: 2019-08-14 | End: 2019-08-14 | Stop reason: HOSPADM

## 2019-08-14 RX ORDER — HYDRALAZINE HYDROCHLORIDE 20 MG/ML
5 INJECTION INTRAMUSCULAR; INTRAVENOUS
Status: DISCONTINUED | OUTPATIENT
Start: 2019-08-14 | End: 2019-08-14 | Stop reason: HOSPADM

## 2019-08-14 RX ORDER — LABETALOL HYDROCHLORIDE 5 MG/ML
5 INJECTION, SOLUTION INTRAVENOUS
Status: DISCONTINUED | OUTPATIENT
Start: 2019-08-14 | End: 2019-08-14 | Stop reason: HOSPADM

## 2019-08-14 RX ORDER — DIPHENHYDRAMINE HYDROCHLORIDE 50 MG/ML
12.5 INJECTION INTRAMUSCULAR; INTRAVENOUS
Status: DISCONTINUED | OUTPATIENT
Start: 2019-08-14 | End: 2019-08-14 | Stop reason: HOSPADM

## 2019-08-14 RX ORDER — MIDAZOLAM HYDROCHLORIDE 1 MG/ML
1 INJECTION INTRAMUSCULAR; INTRAVENOUS
Status: DISCONTINUED | OUTPATIENT
Start: 2019-08-14 | End: 2019-08-14 | Stop reason: HOSPADM

## 2019-08-14 RX ORDER — TRANEXAMIC ACID 100 MG/ML
INJECTION, SOLUTION INTRAVENOUS AS NEEDED
Status: DISCONTINUED | OUTPATIENT
Start: 2019-08-14 | End: 2019-08-14 | Stop reason: SURG

## 2019-08-14 RX ORDER — PANTOPRAZOLE SODIUM 40 MG/1
40 TABLET, DELAYED RELEASE ORAL
Status: DISCONTINUED | OUTPATIENT
Start: 2019-08-15 | End: 2019-08-15 | Stop reason: HOSPADM

## 2019-08-14 RX ORDER — ONDANSETRON 2 MG/ML
4 INJECTION INTRAMUSCULAR; INTRAVENOUS ONCE AS NEEDED
Status: DISCONTINUED | OUTPATIENT
Start: 2019-08-14 | End: 2019-08-14 | Stop reason: HOSPADM

## 2019-08-14 RX ORDER — BUPIVACAINE HYDROCHLORIDE 7.5 MG/ML
INJECTION, SOLUTION EPIDURAL; RETROBULBAR AS NEEDED
Status: DISCONTINUED | OUTPATIENT
Start: 2019-08-14 | End: 2019-08-14 | Stop reason: SURG

## 2019-08-14 RX ORDER — LIOTHYRONINE SODIUM 25 UG/1
12.5 TABLET ORAL EVERY MORNING
Status: DISCONTINUED | OUTPATIENT
Start: 2019-08-15 | End: 2019-08-15 | Stop reason: HOSPADM

## 2019-08-14 RX ORDER — HYDROMORPHONE HYDROCHLORIDE 1 MG/ML
0.5 INJECTION, SOLUTION INTRAMUSCULAR; INTRAVENOUS; SUBCUTANEOUS
Status: DISCONTINUED | OUTPATIENT
Start: 2019-08-14 | End: 2019-08-14 | Stop reason: HOSPADM

## 2019-08-14 RX ORDER — LEVOTHYROXINE SODIUM 137 UG/1
137 TABLET ORAL EVERY MORNING
Status: DISCONTINUED | OUTPATIENT
Start: 2019-08-15 | End: 2019-08-15 | Stop reason: HOSPADM

## 2019-08-14 RX ORDER — BUPROPION HYDROCHLORIDE 150 MG/1
150 TABLET ORAL 2 TIMES DAILY
Status: DISCONTINUED | OUTPATIENT
Start: 2019-08-14 | End: 2019-08-15 | Stop reason: HOSPADM

## 2019-08-14 RX ORDER — MELOXICAM 15 MG/1
15 TABLET ORAL DAILY
Status: DISCONTINUED | OUTPATIENT
Start: 2019-08-14 | End: 2019-08-14

## 2019-08-14 RX ORDER — PROMETHAZINE HYDROCHLORIDE 25 MG/1
25 SUPPOSITORY RECTAL ONCE AS NEEDED
Status: DISCONTINUED | OUTPATIENT
Start: 2019-08-14 | End: 2019-08-14 | Stop reason: HOSPADM

## 2019-08-14 RX ORDER — SUMATRIPTAN 100 MG/1
50 TABLET, FILM COATED ORAL
Status: DISCONTINUED | OUTPATIENT
Start: 2019-08-14 | End: 2019-08-15 | Stop reason: HOSPADM

## 2019-08-14 RX ORDER — FAMOTIDINE 10 MG/ML
20 INJECTION, SOLUTION INTRAVENOUS ONCE
Status: COMPLETED | OUTPATIENT
Start: 2019-08-14 | End: 2019-08-14

## 2019-08-14 RX ORDER — PROPOFOL 10 MG/ML
VIAL (ML) INTRAVENOUS CONTINUOUS PRN
Status: DISCONTINUED | OUTPATIENT
Start: 2019-08-14 | End: 2019-08-14 | Stop reason: SURG

## 2019-08-14 RX ADMIN — CLINDAMYCIN PHOSPHATE 900 MG: 900 INJECTION INTRAVENOUS at 06:52

## 2019-08-14 RX ADMIN — SODIUM CHLORIDE, POTASSIUM CHLORIDE, SODIUM LACTATE AND CALCIUM CHLORIDE 500 ML: 600; 310; 30; 20 INJECTION, SOLUTION INTRAVENOUS at 05:57

## 2019-08-14 RX ADMIN — ASPIRIN 325 MG: 325 TABLET, COATED ORAL at 15:35

## 2019-08-14 RX ADMIN — MUPIROCIN 1 APPLICATION: 20 OINTMENT TOPICAL at 21:29

## 2019-08-14 RX ADMIN — SODIUM CHLORIDE, POTASSIUM CHLORIDE, SODIUM LACTATE AND CALCIUM CHLORIDE: 600; 310; 30; 20 INJECTION, SOLUTION INTRAVENOUS at 06:53

## 2019-08-14 RX ADMIN — ACETAMINOPHEN 1000 MG: 10 INJECTION, SOLUTION INTRAVENOUS at 07:10

## 2019-08-14 RX ADMIN — SODIUM CHLORIDE, POTASSIUM CHLORIDE, SODIUM LACTATE AND CALCIUM CHLORIDE: 600; 310; 30; 20 INJECTION, SOLUTION INTRAVENOUS at 08:28

## 2019-08-14 RX ADMIN — BUPIVACAINE HYDROCHLORIDE 15 MG: 7.5 INJECTION, SOLUTION EPIDURAL; RETROBULBAR at 06:57

## 2019-08-14 RX ADMIN — DOCUSATE SODIUM 100 MG: 100 CAPSULE, LIQUID FILLED ORAL at 15:35

## 2019-08-14 RX ADMIN — POLYETHYLENE GLYCOL 3350 17 G: 17 POWDER, FOR SOLUTION ORAL at 21:28

## 2019-08-14 RX ADMIN — MELOXICAM 15 MG: 15 TABLET ORAL at 05:58

## 2019-08-14 RX ADMIN — VANCOMYCIN HYDROCHLORIDE 1500 MG: 10 INJECTION, POWDER, LYOPHILIZED, FOR SOLUTION INTRAVENOUS at 18:48

## 2019-08-14 RX ADMIN — BUPROPION HYDROCHLORIDE 150 MG: 150 TABLET, EXTENDED RELEASE ORAL at 21:28

## 2019-08-14 RX ADMIN — VANCOMYCIN HYDROCHLORIDE 1500 MG: 10 INJECTION, POWDER, LYOPHILIZED, FOR SOLUTION INTRAVENOUS at 05:57

## 2019-08-14 RX ADMIN — MELOXICAM 15 MG: 15 TABLET ORAL at 15:35

## 2019-08-14 RX ADMIN — TRANEXAMIC ACID 1000 MG: 100 INJECTION, SOLUTION INTRAVENOUS at 07:57

## 2019-08-14 RX ADMIN — EPHEDRINE SULFATE 10 MG: 50 INJECTION INTRAMUSCULAR; INTRAVENOUS; SUBCUTANEOUS at 07:34

## 2019-08-14 RX ADMIN — MUPIROCIN 1 APPLICATION: 20 OINTMENT TOPICAL at 15:36

## 2019-08-14 RX ADMIN — HYDROCODONE BITARTRATE AND ACETAMINOPHEN 1 TABLET: 7.5; 325 TABLET ORAL at 18:50

## 2019-08-14 RX ADMIN — DEXAMETHASONE SODIUM PHOSPHATE 8 MG: 4 INJECTION INTRA-ARTICULAR; INTRALESIONAL; INTRAMUSCULAR; INTRAVENOUS; SOFT TISSUE at 07:15

## 2019-08-14 RX ADMIN — LIDOCAINE HYDROCHLORIDE 100 MG: 20 INJECTION, SOLUTION INFILTRATION; PERINEURAL at 07:01

## 2019-08-14 RX ADMIN — HYDROCODONE BITARTRATE AND ACETAMINOPHEN 1 TABLET: 7.5; 325 TABLET ORAL at 22:52

## 2019-08-14 RX ADMIN — MIDAZOLAM 2 MG: 1 INJECTION INTRAMUSCULAR; INTRAVENOUS at 06:41

## 2019-08-14 RX ADMIN — PREGABALIN 150 MG: 75 CAPSULE ORAL at 05:58

## 2019-08-14 RX ADMIN — FAMOTIDINE 20 MG: 10 INJECTION INTRAVENOUS at 06:41

## 2019-08-14 RX ADMIN — PROPOFOL 75 MCG/KG/MIN: 10 INJECTION, EMULSION INTRAVENOUS at 07:01

## 2019-08-14 RX ADMIN — PHENYLEPHRINE HYDROCHLORIDE 200 MCG: 10 INJECTION INTRAVENOUS at 07:10

## 2019-08-14 RX ADMIN — DOCUSATE SODIUM 100 MG: 100 CAPSULE, LIQUID FILLED ORAL at 21:29

## 2019-08-14 RX ADMIN — KETOROLAC TROMETHAMINE 30 MG: 30 INJECTION, SOLUTION INTRAMUSCULAR at 10:09

## 2019-08-14 NOTE — DISCHARGE PLACEMENT REQUEST
"Hellen Delgado (68 y.o. Female)     Date of Birth Social Security Number Address Home Phone MRN    1950  [id number] [id number] [phone] [id number]    Jain Marital Status          Episcopalian        Admission Date Admission Type Admitting Provider Attending Provider Department, Room/Bed    8/14/19 Elective Stan Kinsey MD Brown, Reid B, MD [address], P884/1    Discharge Date Discharge Disposition Discharge Destination                       Attending Provider:  Stan Kinsey MD    Allergies:  Gabapentin, Telithromycin, Cephalexin, Tegaderm Ag Mesh 2\"x2\" [Wound Dressings], Thyroid    Isolation:  None   Infection:  None   Code Status:  CPR    Ht:  162.6 cm (64\")   Wt:  94.6 kg (208 lb 8.9 oz)    Admission Cmt:  None   Principal Problem:  Primary osteoarthritis of left knee [M17.12] More...                 Active Insurance as of 8/14/2019     Primary Coverage     Payor Plan Insurance Group Employer/Plan Group    MEDICARE MEDICARE A & B      Payor Plan Address Payor Plan Phone Number Payor Plan Fax Number Effective Dates    [address] [phone]  12/1/2017 - None Entered    [facility] [id number]       Subscriber Name Subscriber Birth Date Member ID       HELLEN DELGADO 1950 [id number]           Secondary Coverage     Payor Plan Insurance Group Employer/Plan Group    [facility] SUPP [id number]     Payor Plan Address Payor Plan Phone Number Payor Plan Fax Number Effective Dates    [address]   1/1/2018 - None Entered    [facility] [id number]       Subscriber Name Subscriber Birth Date Member ID       HELLEN DELGADO 1950 [id number]                 Emergency Contacts      (Rel.) Home Phone Work Phone Mobile Phone    DannyJuanjose (Spouse) [phone] -- --        "

## 2019-08-14 NOTE — THERAPY EVALUATION
Patient Name: Hellen Delgado  : 1950    MRN: [id number]                              Today's Date: 2019       Admit Date: 2019    Visit Dx:     ICD-10-CM ICD-9-CM   1. Primary osteoarthritis of left knee M17.12 715.16     Patient Active Problem List   Diagnosis   • Current tear of meniscus of knee   • S/P arthroscopy of knee   • Primary localized osteoarthrosis of left lower leg   • Primary osteoarthritis of left knee   • Chronic pain of left knee     Past Medical History:   Diagnosis Date   • Anxiety    • Arthritis    • Bronchitis    • Depression    • Disease of thyroid gland     underactive thyroid   • GERD (gastroesophageal reflux disease)    • Hypercholesteremia    • Left knee pain    • Migraines    • Osteopenia    • PONV (postoperative nausea and vomiting)    • Sleep apnea     CPAP   • Vertigo      Past Surgical History:   Procedure Laterality Date   • BACK SURGERY  2008   • COLONOSCOPY     • FOREIGN BODY REMOVAL      LEFT FOOT   • GALLBLADDER SURGERY     • HYSTERECTOMY     • KNEE ARTHROSCOPY  1988   • KNEE ARTHROSCOPY Left 2016    Procedure: LEFT KNEE ARTHROSCOPY AND PARTIAL MEDIAL AND LATERAL MENISECTOMIES; DEBRIDEMENT ARTHRITIS;  Surgeon: Starr Thomas MD;  [place] [facility] [place] [facility] [place]  Service:    • THORACIC SPINE SURGERY  2001     General Information     Row Name 19 1607          PT Evaluation Time/Intention    Document Type  evaluation  (Pended)   -BK     Mode of Treatment  physical therapy  (Pended)   -BK     Row Name 19 1607          General Information    Prior Level of Function  independent:  (Pended)   -BK     Existing Precautions/Restrictions  fall  (Pended)   -BK     Barriers to Rehab  none identified  (Pended)   -BK     Row Name 19 1607 19 1456       Relationship/Environment    Lives With  spouse  (Pended)   -BK  spouse  -AT    Row Name 19 0547          Relationship/Environment    Primary Source of Support/Comfort  spouse  -DK      Name of Support/Comfort Primary Source  [place]  -DK     Lives With  spouse  -DK     Name(s) of Who Lives With Patient  [place]  -DK     Family Caregiver if Needed  spouse  -DK     Family Caregiver Names  [place]  -DK     Primary Roles/Responsibilities  retired  -DK     Expected Impact of Illness/Hospitalization  none  -DK     Concerns About Impact on Relationships  none  -DK     Row Name 08/14/19 1607 08/14/19 1456       Resource/Environmental Concerns    Current Living Arrangements  home/apartment/condo  (Pended)   -BK  home/apartment/condo  -AT    Row Name 08/14/19 1119 08/14/19 0547       Resource/Environmental Concerns    Current Living Arrangements  home/apartment/condo  -MP  home/apartment/condo  -DK    Resource/Environmental Concerns  none  -MP  none  -DK    Row Name 08/14/19 1607          Home Main Entrance    Number of Stairs, Main Entrance  three  (Pended)   -BK     Stair Railings, Main Entrance  railings safe and in good condition  (Pended)   -     Row Name 08/14/19 1607          Cognitive Assessment/Intervention- PT/OT    Orientation Status (Cognition)  oriented x 3  (Pended)   -     Row Name 08/14/19 1607          Safety Issues, Functional Mobility    Impairments Affecting Function (Mobility)  range of motion (ROM);strength;pain  (Pended)   -       User Key  (r) = Recorded By, (t) = Taken By, (c) = Cosigned By    Initials Name Provider Type    Laverne Simon, RN Registered Nurse    DEDRICK [place] RN Case Manager    Patsy Breaux, RN Registered Nurse    Katina Soares, PT Student PT Student        Mobility     Row Name 08/14/19 1607          Bed Mobility Assessment/Treatment    Bed Mobility Assessment/Treatment  supine-sit;sit-supine  (Pended)   -ANIYA     Supine-Sit [place] (Bed Mobility)  not tested  (Pended)   -ANIYA     Sit-Supine [place] (Bed Mobility)  not tested  (Pended)   -ANIYA     Comment (Bed Mobility)  sitting in chair  (Pended)   -     Row Name 08/14/19 1607           Sit-Stand Transfer    Sit-Stand [place] (Transfers)  contact guard  (Pended)   -BK     Assistive Device (Sit-Stand Transfers)  walker, front-wheeled  (Pended)   -BK     Row Name 08/14/19 1607          Gait/Stairs Assessment/Training    [place] Level (Gait)  contact guard  (Pended)   -BK     Assistive Device (Gait)  walker, front-wheeled  (Pended)   -BK     Distance in Feet (Gait)  30  (Pended)   -BK     Deviations/Abnormal Patterns (Gait)  antalgic  (Pended)   -BK       User Key  (r) = Recorded By, (t) = Taken By, (c) = Cosigned By    Initials Name Provider Type    Katina Soares, PT Student PT Student        Obj/Interventions     Row Name 08/14/19 1607          General ROM    GENERAL ROM COMMENTS  L knee ROM limited post op   (Pended)   -BK     Row Name 08/14/19 1607          MMT (Manual Muscle Testing)    General MMT Comments  L LE strength decreased post op  (Pended)   -BK     Row Name 08/14/19 1607          Therapeutic Exercise    Comment (Therapeutic Exercise)  10 reps L TKA protocol   (Pended)   -BK     Row Name 08/14/19 1607          Static Standing Balance    Level of [place] (Supported Standing, Static Balance)  contact guard assist  (Pended)   -BK     Assistive Device Utilized (Supported Standing, Static Balance)  walker, rolling  (Pended)   -BK     Row Name 08/14/19 1607          Dynamic Standing Balance    Level of [place], Reaches Outside Midline (Standing, Dynamic Balance)  contact guard assist  (Pended)   -BK     Assistive Device Utilized (Supported Standing, Dynamic Balance)  walker, rolling  (Pended)   -BK       User Key  (r) = Recorded By, (t) = Taken By, (c) = Cosigned By    Initials Name Provider Type    Katina Soares, PT Student PT Student        Goals/Plan     Row Name 08/14/19 1607          Transfer Goal 1 (PT)    Activity/Assistive Device (Transfer Goal 1, PT)  transfers, all;walker, rolling  (Pended)   -ANIYA     [place] Level/Cues Needed (Transfer  Goal 1, PT)  conditional independence  (Pended)   -BK     Time Frame (Transfer Goal 1, PT)  3 days  (Pended)   -BK     Row Name 08/14/19 1607          Gait Training Goal 1 (PT)    Activity/Assistive Device (Gait Training Goal 1, PT)  gait (walking locomotion);walker, rolling  (Pended)   -BK     [place] Level (Gait Training Goal 1, PT)  supervision required  (Pended)   -BK     Distance (Gait Goal 1, PT)  120  (Pended)   -BK     Time Frame (Gait Training Goal 1, PT)  3 days  (Pended)   -BK     Row Name 08/14/19 1607          ROM Goal 1 (PT)    ROM Goal 1 (PT)  0-90 L knee ROM   (Pended)   -BK     Time Frame (ROM Goal 1, PT)  3 days  (Pended)   -BK     Row Name 08/14/19 1607          Stairs Goal 1 (PT)    Activity/Assistive Device (Stairs Goal 1, PT)  stairs, all skills  (Pended)   -BK     [place] Level/Cues Needed (Stairs Goal 1, PT)  contact guard assist  (Pended)   -BK     Number of Stairs (Stairs Goal 1, PT)  4  (Pended)   -BK     Time Frame (Stairs Goal 1, PT)  3 days  (Pended)   -BK       User Key  (r) = Recorded By, (t) = Taken By, (c) = Cosigned By    Initials Name Provider Type    Katina Soares, PT Student PT Student        Clinical Impression     Row Name 08/14/19 1607          Pain Assessment    Additional Documentation  Pain Scale: Numbers Pre/Post-Treatment (Group)  (Pended)   -BK     Row Name 08/14/19 1607          Pain Scale: Numbers Pre/Post-Treatment    Pain Scale: Numbers, Post-Treatment  4/10  (Pended)   -BK     Pain Location - Side  Left  (Pended)   -BK     Pain Location  knee  (Pended)   -BK     Pain Intervention(s)  Repositioned  (Pended)   -BK     Row Name 08/14/19 1640 08/14/19 1607       Plan of Care Review    Plan of Care Reviewed With  patient  (Pended)   -BK  patient  (Pended)   -BK    Row Name 08/14/19 1122 08/14/19 0553       Plan of Care Review    Plan of Care Reviewed With  patient  -MP  patient  -DK    Row Name 08/14/19 1607          Physical Therapy Clinical  Impression    Patient/Family Goals Statement (PT Clinical Impression)  to return to PLOF  (Pended)   -BK     Criteria for Skilled Interventions Met (PT Clinical Impression)  treatment indicated  (Pended)   -BK     Rehab Potential (PT Clinical Summary)  good, to achieve stated therapy goals  (Pended)   -BK     Row Name 08/14/19 1607          Positioning and Restraints    Pre-Treatment Position  bedside commode  (Pended)   -BK     Post Treatment Position  chair  (Pended)   -BK     In Chair  reclined;call light within reach;encouraged to call for assist;exit alarm on;with family/caregiver  (Pended)   -BK       User Key  (r) = Recorded By, (t) = Taken By, (c) = Cosigned By    Initials Name Provider Type    Laverne Simon, RN Registered Nurse    Patsy Breaux RN Registered Nurse    Katina Soares, PT Student PT Student        Outcome Measures     Row Name 08/14/19 1607          How much help from another person do you currently need...    Turning from your back to your side while in flat bed without using bedrails?  3  (Pended)   -BK     Moving from lying on back to sitting on the side of a flat bed without bedrails?  3  (Pended)   -BK     Moving to and from a bed to a chair (including a wheelchair)?  3  (Pended)   -BK     Standing up from a chair using your arms (e.g., wheelchair, bedside chair)?  3  (Pended)   -BK     Climbing 3-5 steps with a railing?  2  (Pended)   -BK     To walk in hospital room?  3  (Pended)   -BK     AM-PAC 6 Clicks Score (PT)  17  (Pended)   -BK     Row Name 08/14/19 1607          Functional Assessment    Outcome Measure Options  AM-PAC 6 Clicks Basic Mobility (PT)  (Pended)   -BK       User Key  (r) = Recorded By, (t) = Taken By, (c) = Cosigned By    Initials Name Provider Type    Katina Soares, PT Student PT Student        Physical Therapy Education     Title: PT OT SLP Therapies (Done)     Topic: Physical Therapy (Done)     Point: Mobility training (Done)      Learning Progress Summary           Patient Acceptance, E,TB,D, VU,NR by ANIYA at 8/14/2019  4:40 PM                   Point: Home exercise program (Done)     Learning Progress Summary           Patient Acceptance, E,TB,D, VU,NR by ANIYA at 8/14/2019  4:40 PM                   Point: Body mechanics (Done)     Learning Progress Summary           Patient Acceptance, E,TB,D, VU,NR by  at 8/14/2019  4:40 PM                   Point: Precautions (Done)     Learning Progress Summary           Patient Acceptance, E,TB,D, VU,NR by  at 8/14/2019  4:40 PM                               User Key     Initials Effective Dates Name Provider Type Discipline     07/05/19 -  Dmitri Santana., PT Student PT Student PT              PT Recommendation and Plan  Planned Therapy Interventions (PT Eval): (P) balance training, bed mobility training, gait training, home exercise program, patient/family education, transfer training, ROM (range of motion), stair training  Outcome Summary/Treatment Plan (PT)  Anticipated Equipment Needs at Discharge (PT): (P) front wheeled walker  Anticipated Discharge Disposition (PT): (P) home with home health  Plan of Care Reviewed With: (P) patient  Outcome Summary: (P) Pt is post op L TKA on 8/14. Pt presents with decreased L knee ROM and L LE strength post op. Pt presents with difficulty walking secondary to pain and required cueing for gait sequencing and to not step too close to AD. Pt is a good candidate for skilled therapy to address gait, ROM, strength, and stair navigation to improve functional independence.      Time Calculation:   PT Charges     Row Name 08/14/19 7028             Time Calculation    Start Time  1548  (Pended)   -BK      Stop Time  1607  (Pended)   -BK      Time Calculation (min)  19 min  (Pended)   -BK      PT Received On  08/14/19  (Pended)   -BK      PT - Next Appointment  08/15/19  (Pended)   -ANIYA      PT Goal Re-Cert Due Date  08/17/19  (Pended)   -ANIYA         Time  Calculation- PT    Total Timed Code Minutes- PT  15 minute(s)  (Pended)   -ANIYA        User Key  (r) = Recorded By, (t) = Taken By, (c) = Cosigned By    Initials Name Provider Type    Katina Soares, PT Student PT Student        Therapy Charges for Today     Code Description Service Date Service Provider Modifiers Qty    14165332986 HC PT EVAL MOD COMPLEXITY 2 8/14/2019 Katina Santana, PT Student GP 1    86162232058  PT THER PROC EA 15 MIN 8/14/2019 Katina Santana, PT Student GP 1          PT G-Codes  Outcome Measure Options: (P) AM-PAC 6 Clicks Basic Mobility (PT)  AM-PAC 6 Clicks Score (PT): (P) 17    Esther Rizvi, PT Student  8/14/2019

## 2019-08-14 NOTE — ANESTHESIA POSTPROCEDURE EVALUATION
Patient: Hellen Delgado    Procedure Summary     Date:  08/14/19 Room / Location:  [address] MAIN OR    Anesthesia Start:  0652 Anesthesia Stop:  0848    Procedure:  TOTAL KNEE ARTHROPLASTY (Left Knee) Diagnosis:       Primary osteoarthritis of left knee      (Primary osteoarthritis of left knee [M17.12])    Surgeon:  Stan Kinsey MD Provider:  Tee Ceja MD    Anesthesia Type:  spinal ASA Status:  3          Anesthesia Type: spinal  Last vitals  BP   109/65 (08/14/19 0930)   Temp   36.5 °C (97.7 °F) (08/14/19 0843)   Pulse   59 (08/14/19 0930)   Resp   16 (08/14/19 0930)     SpO2   99 % (08/14/19 0930)     Post Anesthesia Care and Evaluation    Patient location during evaluation: PACU  Patient participation: complete - patient participated  Level of consciousness: awake and alert  Pain management: adequate  Airway patency: patent  Anesthetic complications: No anesthetic complications    Cardiovascular status: acceptable  Respiratory status: acceptable  Hydration status: acceptable    Comments: --------------------            08/14/19 0930     --------------------   BP:       109/65     Pulse:      59       Resp:       16       Temp:                SpO2:      99%      --------------------

## 2019-08-14 NOTE — OP NOTE
Name: Hellen Delgado  YOB: 1950    DATE OF SURGERY: 8/14/2019    PREOPERATIVE DIAGNOSIS: Left knee end-stage osteoarthritis    POSTOPERATIVE DIAGNOSIS: Left knee end-stage osteoarthritis    PROCEDURE PERFORMED: Left total knee replacement    SURGEON: Stan Kinsey M.D.    ASSISTANT: VIRAL DUMONT    IMPLANTS: [employer]:     Implant Name Type Inv. Item Serial No.  Lot No. LRB No. Used   CMT BONE PALACOS R HI/VISC 1X40 - [id number] Implant CMT BONE PALACOS R HI/VISC 1X40  [facility] [id number] Left 1   PAT GEN2 BICONVEX [id number] - [id number] Implant PAT GEN2 BICONVEX [id number]  [employer] [id number] Left 1   BASE TIB/KN GEN2 NONPOR TI SZ3 LT - [id number] Implant BASE TIB/KN GEN2 NONPOR TI SZ3 LT  [employer] [id number] Left 1   COMP FEM LEGION OXINIUM CR SZ4 LT - [id number] Implant COMP FEM LEGION OXINIUM CR SZ4 LT  [employer] [id number] Left 1   INSRT KN CR FLX DEEP GEN2 SZ3 4 9MM - [id number] Implant INSRT KN CR FLX DEEP GEN2 SZ3 4 9MM  [employer] [id number] Left 1       Estimated Blood Loss: 200cc  Specimens : none  Complications: none    DESCRIPTION OF PROCEDURE: The patient was taken to the operating room and placed in the supine position. A sequential compression device was carefully placed on the non-operative leg. Preoperative antibiotics were administered. Surgical time out was performed. After adequate induction of anesthesia, the leg was prepped and draped in the usual sterile fashion, exsanguinated with an Esmarch bandage and the tourniquet inflated to 250 mmHg. A midline incision was performed followed by a medial parapatellar arthrotomy. The patella was subluxed laterally.  A portion of the fat pad, ACL, and anterior horns of the meniscus were excised. The drill hole was placed in the distal femur and the canal was the irrigated and suctioned. The IM darian was placed and a 5 degree distal valgus cut was performed on the femur. The femur was  then sized with a sizing guide. The femoral cutting block was placed and all femoral cuts were performed. The proximal tibia was exposed. We used the extramedullary tibial cutting guide set for removal of 9mm of bone off the high side. The tibial cut was performed. The posterior horns of the menisci were excised. The posterior osteophytes were removed. Flexion extension blocks were then used to balance the knee. The tibial cut surface was then sized with the sizing templates and the tibial and femoral trial were then placed. The knee was placed in full extension and then the tibial tray rotation was then matched to the femoral rotation and marked.    Attention was then placed to the patella. The patella was noted to track centrally through range of motion. The patella was then sized with the trials. The thickness of the patella was then measured. The patella was resurfaced and the surrounding osteophytes were removed. The preoperative thickness was reproduced. The patella tracked centrally through range of motion.   At this point all trial components were removed, the knee was copiously irrigated with pulsed lavage, and the knee was injected with anesthetic cocktail solution. The cut surfaces were then dried with clean lap sponges, and the components were cemented tibia, followed by femur, then patella. The knee was held in full extension and all excess cement was removed. The knee was held still until the cement had completely hardened. We then placed the trial polyethylene spacer which resulted in full extension and excellent flexion-extension balance. We placed the final polyethylene spacer.   The knee was then copiously irrigated. The tourniquet was then released. There was excellent hemostasis. We placed a one-eighth inch Hemovac drain. We closed the knee in multiple layers in standard fashion. Sterile dressing were applied. At the end of the case, the sponge and needle counts were reported as being correct.  There were no known complications. The patient was then transported to the recovery room.      Stan Kinsey M.D.

## 2019-08-14 NOTE — ANESTHESIA PROCEDURE NOTES
Spinal Block      Patient location during procedure: OB  Start Time: 8/14/2019 6:57 AM  Performed By  Anesthesiologist: Tee Ceja MD  CRNA: Esperanza Hoffman CRNA  Preanesthetic Checklist  Completed: patient identified, site marked, surgical consent, pre-op evaluation, timeout performed, IV checked, risks and benefits discussed and monitors and equipment checked  Spinal Block Prep:  Patient Position:sitting  Sterile Tech:cap, gloves, mask and sterile barriers  Prep:Chloraprep  Patient Monitoring:blood pressure monitoring and continuous pulse oximetry  Spinal Block Procedure  Approach:midline  Guidance:landmark technique  Location:L3-L4  Needle Type:Sprotte  Needle Gauge:22 G  Placement of Spinal needle event:cerebrospinal fluid aspirated  Paresthesia: no  Fluid Appearance:clear     Post Assessment  Patient Tolerance:patient tolerated the procedure well with no apparent complications  Complications no

## 2019-08-14 NOTE — PLAN OF CARE
Problem: Patient Care Overview  Goal: Plan of Care Review  Outcome: Ongoing (interventions implemented as appropriate)   08/14/19 1640   Coping/Psychosocial   Plan of Care Reviewed With patient   OTHER   Outcome Summary Pt is post op L TKA on 8/14. Pt presents with decreased L knee ROM and L LE strength post op. Pt presents with difficulty walking secondary to pain and required cueing for gait sequencing and to not step too close to AD. Pt is a good candidate for skilled therapy to address gait, ROM, strength, and stair navigation to improve functional independence.

## 2019-08-14 NOTE — ANESTHESIA PREPROCEDURE EVALUATION
Anesthesia Evaluation     Patient summary reviewed and Nursing notes reviewed   history of anesthetic complications: PONV               Airway   Mallampati: II  TM distance: >3 FB  Neck ROM: full  no difficulty expected  Dental - normal exam     Pulmonary     breath sounds clear to auscultation  (+) sleep apnea on CPAP,   (-) shortness of breath, decreased breath sounds, wheezes  Cardiovascular - normal exam  Exercise tolerance: good (4-7 METS)    Rhythm: regular  Rate: normal    (+) hyperlipidemia,   (-) past MI, angina, CHF, orthopnea, PND, [place], PVD      Neuro/Psych  (+) headaches, dizziness/light headedness, psychiatric history Anxiety and Depression,     (-) seizures, neuromuscular disease, TIA, CVA, weakness, numbness  GI/Hepatic/Renal/Endo    (+) obesity,  GERD,  hypothyroidism,   (-) liver disease, diabetes    Musculoskeletal     (+) arthralgias, chronic pain, gait problem, joint swelling,   Abdominal   (+) obese,    Substance History - negative use  (-) alcohol use, drug use     OB/GYN negative ob/gyn ROS         Other   (+) arthritis                     Anesthesia Plan    ASA 3     spinal     intravenous induction   Anesthetic plan, all risks, benefits, and alternatives have been provided, discussed and informed consent has been obtained with: patient.    Plan discussed with CRNA.

## 2019-08-15 VITALS
WEIGHT: 208.56 LBS | SYSTOLIC BLOOD PRESSURE: 120 MMHG | RESPIRATION RATE: 16 BRPM | HEIGHT: 64 IN | TEMPERATURE: 96.6 F | OXYGEN SATURATION: 99 % | BODY MASS INDEX: 35.61 KG/M2 | DIASTOLIC BLOOD PRESSURE: 68 MMHG | HEART RATE: 54 BPM

## 2019-08-15 LAB
HCT VFR BLD AUTO: 39.7 % (ref 34–46.6)
HGB BLD-MCNC: 12.5 G/DL (ref 12–15.9)

## 2019-08-15 PROCEDURE — 85018 HEMOGLOBIN: CPT | Performed by: ORTHOPAEDIC SURGERY

## 2019-08-15 PROCEDURE — 25010000002 KETOROLAC TROMETHAMINE PER 15 MG: Performed by: ORTHOPAEDIC SURGERY

## 2019-08-15 PROCEDURE — 97110 THERAPEUTIC EXERCISES: CPT

## 2019-08-15 PROCEDURE — 97150 GROUP THERAPEUTIC PROCEDURES: CPT

## 2019-08-15 PROCEDURE — 85014 HEMATOCRIT: CPT | Performed by: ORTHOPAEDIC SURGERY

## 2019-08-15 RX ORDER — PSEUDOEPHEDRINE HCL 30 MG
100 TABLET ORAL 2 TIMES DAILY
Qty: 26 EACH | Refills: 0 | Status: SHIPPED | OUTPATIENT
Start: 2019-08-15 | End: 2019-08-29 | Stop reason: SDUPTHER

## 2019-08-15 RX ORDER — HYDROCODONE BITARTRATE AND ACETAMINOPHEN 7.5; 325 MG/1; MG/1
TABLET ORAL
Qty: 84 TABLET | Refills: 0 | Status: SHIPPED | OUTPATIENT
Start: 2019-08-15 | End: 2020-08-11

## 2019-08-15 RX ADMIN — ASPIRIN 325 MG: 325 TABLET, COATED ORAL at 08:38

## 2019-08-15 RX ADMIN — KETOROLAC TROMETHAMINE 30 MG: 30 INJECTION, SOLUTION INTRAMUSCULAR at 08:38

## 2019-08-15 RX ADMIN — HYDROCODONE BITARTRATE AND ACETAMINOPHEN 1 TABLET: 7.5; 325 TABLET ORAL at 02:48

## 2019-08-15 RX ADMIN — PANTOPRAZOLE SODIUM 40 MG: 40 TABLET, DELAYED RELEASE ORAL at 06:48

## 2019-08-15 RX ADMIN — DOCUSATE SODIUM 100 MG: 100 CAPSULE, LIQUID FILLED ORAL at 08:38

## 2019-08-15 RX ADMIN — HYDROCODONE BITARTRATE AND ACETAMINOPHEN 1 TABLET: 7.5; 325 TABLET ORAL at 12:04

## 2019-08-15 RX ADMIN — HYDROCODONE BITARTRATE AND ACETAMINOPHEN 2 TABLET: 7.5; 325 TABLET ORAL at 06:49

## 2019-08-15 RX ADMIN — POLYETHYLENE GLYCOL 3350 17 G: 17 POWDER, FOR SOLUTION ORAL at 08:38

## 2019-08-15 RX ADMIN — KETOROLAC TROMETHAMINE 30 MG: 30 INJECTION, SOLUTION INTRAMUSCULAR at 00:29

## 2019-08-15 RX ADMIN — BUPROPION HYDROCHLORIDE 150 MG: 150 TABLET, EXTENDED RELEASE ORAL at 08:38

## 2019-08-15 RX ADMIN — MUPIROCIN 1 APPLICATION: 20 OINTMENT TOPICAL at 08:38

## 2019-08-15 RX ADMIN — LEVOTHYROXINE SODIUM 137 MCG: 137 TABLET ORAL at 06:48

## 2019-08-15 RX ADMIN — LIOTHYRONINE SODIUM 12.5 MCG: 25 TABLET ORAL at 06:48

## 2019-08-15 NOTE — PLAN OF CARE
Problem: Patient Care Overview  Goal: Plan of Care Review  Outcome: Ongoing (interventions implemented as appropriate)   08/15/19 0334   Coping/Psychosocial   Plan of Care Reviewed With patient   OTHER   Outcome Summary pt wearing own C_PAP. pain well controlled.   Plan of Care Review   Progress improving       Problem: Fall Risk (Adult)  Goal: Absence of Fall  Outcome: Ongoing (interventions implemented as appropriate)   08/15/19 0334   Fall Risk (Adult)   Absence of Fall making progress toward outcome

## 2019-08-15 NOTE — PLAN OF CARE
Problem: Patient Care Overview  Goal: Plan of Care Review  Outcome: Ongoing (interventions implemented as appropriate)   08/15/19 1027   Coping/Psychosocial   Plan of Care Reviewed With patient   OTHER   Outcome Summary Pt demonstrates increased activity tolerance as she was able to ambulate further today. Pt educated on proper stair navigation and verbalizes understanding. PT to follow up to continue to address ROM, gait, strength, and functional mobility. Pt plans to DC home today.

## 2019-08-15 NOTE — PROGRESS NOTES
Continued Stay Note  [facility]     Patient Name: Hellen Delgado  MRN: [id number]  Today's Date: 8/15/2019    Admit Date: 8/14/2019    Discharge Plan     Row Name 08/15/19 1341       Plan    Final Discharge Disposition Code  06 - home with home health care    Final Note  Pt to d/c home with [facility].        Discharge Codes    No documentation.       Expected Discharge Date and Time     Expected Discharge Date Expected Discharge Time    Aug 15, 2019             Niharika Crouch RN

## 2019-08-15 NOTE — THERAPY TREATMENT NOTE
Patient Name: Hellen Delgado  : 1950    MRN: [id number]                              Today's Date: 8/15/2019       Admit Date: 2019    Visit Dx:     ICD-10-CM ICD-9-CM   1. Primary osteoarthritis of left knee M17.12 715.16     Patient Active Problem List   Diagnosis   • Current tear of meniscus of knee   • S/P arthroscopy of knee   • Primary localized osteoarthrosis of left lower leg   • Primary osteoarthritis of left knee   • Chronic pain of left knee     Past Medical History:   Diagnosis Date   • Anxiety    • Arthritis    • Bronchitis    • Depression    • Disease of thyroid gland     underactive thyroid   • GERD (gastroesophageal reflux disease)    • Hypercholesteremia    • Left knee pain    • Migraines    • Osteopenia    • PONV (postoperative nausea and vomiting)    • Sleep apnea     CPAP   • Vertigo      Past Surgical History:   Procedure Laterality Date   • BACK SURGERY  2008   • COLONOSCOPY     • FOREIGN BODY REMOVAL      LEFT FOOT   • GALLBLADDER SURGERY     • HYSTERECTOMY     • KNEE ARTHROSCOPY  1988   • KNEE ARTHROSCOPY Left 2016    Procedure: LEFT KNEE ARTHROSCOPY AND PARTIAL MEDIAL AND LATERAL MENISECTOMIES; DEBRIDEMENT ARTHRITIS;  Surgeon: Starr Thomas MD;  Location: [facility];  Service:    • THORACIC SPINE SURGERY  2001   • TOTAL KNEE ARTHROPLASTY Left 2019    Procedure: TOTAL KNEE ARTHROPLASTY;  Surgeon: Stan Kinsey MD;  Location: [facility];  Service: Orthopedics     General Information     Row Name 08/15/19 1002          PT Evaluation Time/Intention    Document Type  therapy note (daily note)  (Pended)   -ANIYA     Mode of Treatment  physical therapy;group therapy  (Pended)   -ANIYA     Row Name 08/15/19 1002          General Information    Existing Precautions/Restrictions  fall  (Pended)   -ANIYA     Row Name 08/15/19 1002          Cognitive Assessment/Intervention- PT/OT    Orientation Status (Cognition)  oriented x 4  (Pended)   -ANIYA     Row Name 08/15/19  1002          Safety Issues, Functional Mobility    Impairments Affecting Function (Mobility)  pain;strength;range of motion (ROM)  (Pended)   -BK       User Key  (r) = Recorded By, (t) = Taken By, (c) = Cosigned By    Initials Name Provider Type    Katina Soares, PT Student PT Student        Mobility     Row Name 08/15/19 1002          Bed Mobility Assessment/Treatment    Bed Mobility Assessment/Treatment  supine-sit;sit-supine  (Pended)   -BK     Supine-Sit Kend[place] (Bed Mobility)  not tested  (Pended)   -BK     Sit-Supine [place] (Bed Mobility)  not tested  (Pended)   -BK     Comment (Bed Mobility)  sitting in chair   (Pended)   -BK     Row Name 08/15/19 1002          Sit-Stand Transfer    Sit-Stand Kendall (Transfers)  contact guard  (Pended)   -BK     Assistive Device (Sit-Stand Transfers)  walker, front-wheeled  (Pended)   -BK     Row Name 08/15/19 1002          Gait/Stairs Assessment/Training    Kendall Level (Gait)  contact guard;stand by assist  (Pended)   -BK     Assistive Device (Gait)  walker, front-wheeled  (Pended)   -BK     Distance in Feet (Gait)  180  (Pended)   -BK     Deviations/Abnormal Patterns (Gait)  antalgic  (Pended)   -BK     Number of Steps (Stairs)  4  (Pended)   -BK     Ascending Technique (Stairs)  step-to-step  (Pended)   -BK     Descending Technique (Stairs)  step-to-step  (Pended)   -BK     Comment (Gait/Stairs)  CGA for stairs   (Pended)   -BK       User Key  (r) = Recorded By, (t) = Taken By, (c) = Cosigned By    Initials Name Provider Type    Katina Soares, PT Student PT Student        Obj/Interventions     Row Name 08/15/19 1002          General ROM    GENERAL ROM COMMENTS  2-89 L knee ROM   (Pended)   -BK     Row Name 08/15/19 1002          Therapeutic Exercise    Comment (Therapeutic Exercise)  15 reps L TKA protocol   (Pended)   -BK       User Key  (r) = Recorded By, (t) = Taken By, (c) = Cosigned By    Initials Name Provider Type    ANIYA  Dmitri Santana., PT Student PT Student        Goals/Plan    No documentation.       Clinical Impression     Row Name 08/15/19 1002          Pain Assessment    Additional Documentation  Pain Scale: Numbers Pre/Post-Treatment (Group)  (Pended)   -BK     Row Name 08/15/19 1002          Pain Scale: Numbers Pre/Post-Treatment    Pain Scale: Numbers, Post-Treatment  2/10  (Pended)   -BK     Pain Location - Side  Left  (Pended)   -BK     Pain Location  knee  (Pended)   -BK     Pain Intervention(s)  Repositioned  (Pended)   -BK     Row Name 08/15/19 1027 08/15/19 1002       Plan of Care Review    Plan of Care Reviewed With  patient  (Pended)   -BK  patient  (Pended)   -BK    Row Name 08/15/19 0334          Plan of Care Review    Plan of Care Reviewed With  patient  -DJ     Row Name 08/15/19 1002          Positioning and Restraints    Pre-Treatment Position  sitting in chair/recliner  (Pended)   -BK     Post Treatment Position  chair  (Pended)   -BK     In Chair  reclined;call light within reach;encouraged to call for assist;with family/caregiver  (Pended)   -BK       User Key  (r) = Recorded By, (t) = Taken By, (c) = Cosigned By    Initials Name Provider Type    Melany Martin, RN Registered Nurse    Dmitri Soares., PT Student PT Student        Outcome Measures     Row Name 08/15/19 1028          How much help from another person do you currently need...    Turning from your back to your side while in flat bed without using bedrails?  3  (Pended)   -BK     Moving from lying on back to sitting on the side of a flat bed without bedrails?  3  (Pended)   -BK     Moving to and from a bed to a chair (including a wheelchair)?  3  (Pended)   -BK     Standing up from a chair using your arms (e.g., wheelchair, bedside chair)?  3  (Pended)   -BK     Climbing 3-5 steps with a railing?  3  (Pended)   -BK     To walk in hospital room?  3  (Pended)   -BK     AM-PAC 6 Clicks Score (PT)  18  (Pended)   -BK     Row Name  08/15/19 1028          Functional Assessment    Outcome Measure Options  AM-PAC 6 Clicks Basic Mobility (PT)  (Pended)   -       User Key  (r) = Recorded By, (t) = Taken By, (c) = Cosigned By    Initials Name Provider Type    Katina Soares, PT Student PT Student        Physical Therapy Education     Title: PT OT SLP Therapies (Done)     Topic: Physical Therapy (Done)     Point: Mobility training (Done)     Learning Progress Summary           Patient Acceptance, E,TB,D, VU by ANIYA at 8/15/2019 10:27 AM    Acceptance, E,TB,D, VU,NR by ANIYA at 8/14/2019  4:40 PM                   Point: Home exercise program (Done)     Learning Progress Summary           Patient Acceptance, E,TB,D, VU by ANIYA at 8/15/2019 10:27 AM    Acceptance, E,TB,D, VU,NR by ANIYA at 8/14/2019  4:40 PM                   Point: Body mechanics (Done)     Learning Progress Summary           Patient Acceptance, E,TB,D, VU by ANIYA at 8/15/2019 10:27 AM    Acceptance, E,TB,D, VU,NR by ANIYA at 8/14/2019  4:40 PM                   Point: Precautions (Done)     Learning Progress Summary           Patient Acceptance, E,TB,D, VU by ANIYA at 8/15/2019 10:27 AM    Acceptance, E,TB,D, VU,NR by ANIYA at 8/14/2019  4:40 PM                               User Key     Initials Effective Dates Name Provider Type [facility] 07/05/19 -  Katina Santana, PT Student PT Student PT              PT Recommendation and Plan  Planned Therapy Interventions (PT Eval): balance training, bed mobility training, gait training, home exercise program, patient/family education, transfer training, ROM (range of motion), stair training  Outcome Summary/Treatment Plan (PT)  Anticipated Equipment Needs at Discharge (PT): (P) front wheeled walker  Anticipated Discharge Disposition (PT): (P) home with home health  Plan of Care Reviewed With: (P) patient  Outcome Summary: (P) Pt demonstrates increased activity tolerance as she was able to ambulate further today. Pt educated on proper stair  navigation and verbalizes understanding. PT to follow up to continue to address ROM, gait, strength, and functional mobility. Pt plans to DC home today.      Time Calculation:   PT Charges     Row Name 08/15/19 1029             Time Calculation    Start Time  0930  (Pended)   -BK      Stop Time  1002  (Pended)   -BK      Time Calculation (min)  32 min  (Pended)   -BK      PT Received On  08/15/19  (Pended)   -BK      PT - Next Appointment  08/15/19  (Pended)   -BK         Time Calculation- PT    Total Timed Code Minutes- PT  17 minute(s)  (Pended)   -BK        User Key  (r) = Recorded By, (t) = Taken By, (c) = Cosigned By    Initials Name Provider Type    Katina Soares, PT Student PT Student        Therapy Charges for Today     Code Description Service Date Service Provider Modifiers Qty    55769838669 HC PT EVAL MOD COMPLEXITY 2 8/14/2019 Katina Santana, PT Student GP 1    13295264291 HC PT THER PROC EA 15 MIN 8/14/2019 Katina Santana, PT Student GP 1    54076756490 HC PT THER PROC EA 15 MIN 8/15/2019 Dmitri Santana., PT Student GP 1    13989860653 HC PT THER PROC GROUP 8/15/2019 Katina Santana, PT Student GP 1          PT G-Codes  Outcome Measure Options: (P) AM-PAC 6 Clicks Basic Mobility (PT)  AM-PAC 6 Clicks Score (PT): (P) 18    Esther Rizvi, PT Student  8/15/2019

## 2019-08-15 NOTE — DISCHARGE SUMMARY
Patient Name: Hellen Delgado  Patient YOB: 1950    Date of Admission:  8/14/2019  Date of Discharge:  8/15/2019  Discharge Diagnosis: TOTAL KNEE ARTHROPLASTY  Presenting Problem/History of Present Illness: Primary osteoarthritis of left knee [M17.12]  Chronic pain of left knee [M25.562, G89.29]  Admitting Physician: Dr Stan Kinsey  Consults:   Consults     No orders found for last 30 day(s).          DETAILS OF HOSPITAL STAY:  Patient is a 68 y.o. female was admitted to the floor following the above procedure and underwent an uncomplicated hospital stay.  Patient did well with physical therapy and was ambulating well without problems.  On the day of discharge the wound was clean, dry and intact and calf was soft and non tender and Homans sign was negative.  Patient was tolerating  without problems.  Patient will be discharged home.    Condition on Discharge:  Stable    Vital Signs  Temp:  [96.2 °F (35.7 °C)-97.8 °F (36.6 °C)] 96.2 °F (35.7 °C)  Heart Rate:  [49-69] 58  Resp:  [16] 16  BP: ()/(49-90) 100/53    LABS:      Admission on 08/14/2019   Component Date Value Ref Range Status   • Hemoglobin 08/15/2019 12.5  12.0 - 15.9 g/dL Final   • Hematocrit 08/15/2019 39.7  34.0 - 46.6 % Final       No results found.    Discharge Medications     Discharge Medications      New Medications      Instructions Start Date   aspirin 325 MG EC tablet   325 mg, Oral, 2 Times Daily      docusate sodium 100 MG capsule   100 mg, Oral, 2 Times Daily      HYDROcodone-acetaminophen 7.5-325 MG per tablet  Commonly known as:  NORCO   1-2 po q 4-6 hr prn pain      polyethylene glycol pack packet  Commonly known as:  MIRALAX   17 g, Oral, 2 Times Daily         Continue These Medications      Instructions Start Date   atorvastatin 40 MG tablet  Commonly known as:  LIPITOR   40 mg, Oral, Nightly      buPROPion  MG 24 hr tablet  Commonly known as:  WELLBUTRIN XL   150 mg, Oral, 2 Times Daily      conjugated  estrogens 0.625 MG/GM vaginal cream  Commonly known as:  PREMARIN   0.625 g, Vaginal, 2 Times Weekly      fexofenadine 180 MG tablet  Commonly known as:  ALLEGRA   180 mg, Oral, As Needed      hydrocortisone 2.5 % ointment   1 application, Topical, Daily PRN      lansoprazole 30 MG capsule  Commonly known as:  PREVACID   30 mg, Oral, As Needed      levothyroxine 137 MCG tablet  Commonly known as:  SYNTHROID, LEVOTHROID   137 mcg, Oral, Every Morning      liothyronine 25 MCG tablet  Commonly known as:  CYTOMEL   12.5 mcg, Oral, Every Morning      SUMAtriptan 50 MG tablet  Commonly known as:  IMITREX   50 mg, Oral, Every 2 Hours PRN      triamcinolone 0.1 % cream  Commonly known as:  KENALOG   1 application, Topical, 2 Times Daily PRN, PRN         Stop These Medications    ADVIL 200 MG tablet  Generic drug:  ibuprofen     Chlorhexidine Gluconate Cloth 2 % pads     Coenzyme Q10 10 MG capsule     MULTIVITAMIN ADULT PO     mupirocin 2 % nasal ointment  Commonly known as:  BACTROBAN     naproxen sodium 220 MG tablet  Commonly known as:  ALEVE     vitamin B-12 1000 MCG tablet  Commonly known as:  CYANOCOBALAMIN     vitamin C 250 MG tablet  Commonly known as:  ASCORBIC ACID            Activity at Discharge:     Discharge Instructions: Patient is to continue with physical therapy exercises daily and continue working with the physical therapist as ordered. Patient may weight bear as tolerated. Apply ice regularly. Patient may ice for long periods of time as long as ice is not directly on the skin. Patient instructed on frequent calf pumping exercises.  Patient also instructed on incentive spirometer during hospitalization and encouraged to continue to use at home regularly.    Dressing: The dressing is designed to be left in place until you return to the office in 2 weeks.  The suction unit should stop functioning at 7 days and the green light will switch to yellow.  At that point the suction unit and tubing can be  disconnected at the port closest to the dressing.  The suction unit and tubing may be discarded.  You may shower immediately upon return home, you will need to turn the pump off by depressing the orange button once and then you may disconnect the pump and tubing at the connection port.  After showering, shake off the excess water and reattach the tubing.  Restart the pump by depressing the orange button one time and you will notice the green light flashing again.  If the dressing becomes disloged or saturated it should be changed. Please refer to the MARY JANE information sheet if you have any questions about the dressing.  If you have a home health nurse or therapist they can be contacted to assist with dressing change or repair. You may also call the MARY JANE dressing hotline for questions related to the dressing ([phone]). If there still other problems or questions related to the dressing despite these measures then you can contact Carlie at our office [phone].  If for some reason the MARY JANE dressing is removed, after 7 days the wound can be gently cleaned with antibacterial soap then allowed to dry and covered with a dry sterile dressing. The wound should be covered at all times except while showering.  Patient may change dressings daily and prn using sterile 4x4 and paper tape, and should call if any unusual drainage, redness or swelling.*  Follow up appointment in 2 weeks - patient to call the office at [phone] to schedule.  Patient will be discharged on aspirin 325mg BID x 2weeks, then daily x 4weeks    Discharge Diagnosis:    Patient Active Problem List   Diagnosis   • Current tear of meniscus of knee   • S/P arthroscopy of knee   • Primary localized osteoarthrosis of left lower leg   • Primary osteoarthritis of left knee   • Chronic pain of left knee       Follow-up Appointments  Future Appointments   Date Time Provider Department Center   8/27/2019  2:10 PM Stan Kinsey MD [facility] None     Additional  Instructions for the Follow-ups that You Need to Schedule     Referral to home health   As directed      PT to see for Home PT protocol. Daily for first week then 3x/ wk for second week. Staples to be removed at f/u appointment in 2 weeks.    Order Comments:  PT to see for Home PT protocol. Daily for first week then 3x/ wk for second week. Staples to be removed at f/u appointment in 2 weeks.     Face to Face Visit Date:  8/15/2019    Follow-up Provider for Plan of Care?:  I will be treating the patient on an ongoing basis.  Please send me the Plan of Care for signature.    Follow-up Provider:  EMY KINSEY [[id number]]    Reason/Clinical Findings:  post op knee replacement    Describe mobility limitations that make leaving home difficult:  pain, swelling, weakness, limited mobility, difficulty ambulating without assistive device    Nursing/Therapeutic Services Requested:  Physicial Therapy                  Emy Kinsey MD  08/15/19  8:16 AM

## 2019-08-15 NOTE — PLAN OF CARE
Problem: Patient Care Overview  Goal: Plan of Care Review  Outcome: Ongoing (interventions implemented as appropriate)   08/14/19 1942   Coping/Psychosocial   Plan of Care Reviewed With patient   OTHER   Outcome Summary Post op L TKA, c/o pain that was relieved w/ PRN meds, A&Ox4, up w/ assist x1, will continue to monitor.    Plan of Care Review   Progress no change

## 2019-08-15 NOTE — PLAN OF CARE
Problem: Patient Care Overview  Goal: Plan of Care Review  Outcome: Ongoing (interventions implemented as appropriate)   08/15/19 1231   Coping/Psychosocial   Plan of Care Reviewed With patient   OTHER   Outcome Summary VSS. Pt denies pain. Premedicated for activities. Dressing c/d/i. Up to chair and worked with PT today. Ambulates with assist and walker. Voiding per BRP. Discussed 02 monitoring r/t JON. Discharging home with HH.    Plan of Care Review   Progress improving       Problem: Skin Injury Risk (Adult)  Goal: Skin Health and Integrity  Outcome: Ongoing (interventions implemented as appropriate)      Problem: Fall Risk (Adult)  Goal: Absence of Fall  Outcome: Ongoing (interventions implemented as appropriate)

## 2019-08-27 ENCOUNTER — OFFICE VISIT (OUTPATIENT)
Dept: ORTHOPEDIC SURGERY | Facility: CLINIC | Age: 69
End: 2019-08-27

## 2019-08-27 VITALS — TEMPERATURE: 98.6 F

## 2019-08-27 DIAGNOSIS — Z96.652 STATUS POST LEFT KNEE REPLACEMENT: Primary | ICD-10-CM

## 2019-08-27 PROCEDURE — 99024 POSTOP FOLLOW-UP VISIT: CPT | Performed by: ORTHOPAEDIC SURGERY

## 2019-08-27 NOTE — PROGRESS NOTES
Hellen Delgado : 1950 MRN: [id number] DATE: 2019    DIAGNOSIS: 2 week follow up left total knee      SUBJECTIVE:Patient returns today for 2 week follow up of left total knee replacement. Patient reports doing well with no unusual complaints. Appears to be progressing appropriately.    OBJECTIVE:   Exam:. The incision is healing appropriately. No sign of infection. Range of motion is progressing as expected. The calf is soft and nontender with a negative Homans sign.    ASSESSMENT: 2 week status post left knee replacement.    PLAN: 1) Staples removed and steri strips applied   2) Order given for PT   3) Discontinue KAYLAH hose   4) Continue ice PRN   5) aspirin 325 mg orally every day for 1 month   6) Follow up in 6 weeks with repeat Xrays of left knee (3views)    Stan Kinsey MD  2019

## 2019-08-29 ENCOUNTER — HOSPITAL ENCOUNTER (OUTPATIENT)
Dept: PHYSICAL THERAPY | Facility: HOSPITAL | Age: 69
Setting detail: THERAPIES SERIES
Discharge: HOME | End: 2019-08-29

## 2019-08-29 DIAGNOSIS — Z96.652 STATUS POST TOTAL LEFT KNEE REPLACEMENT: Primary | ICD-10-CM

## 2019-08-29 DIAGNOSIS — M25.562 ACUTE PAIN OF LEFT KNEE: ICD-10-CM

## 2019-08-29 DIAGNOSIS — R26.9 ABNORMAL GAIT: ICD-10-CM

## 2019-08-29 PROCEDURE — 97110 THERAPEUTIC EXERCISES: CPT | Performed by: PHYSICAL THERAPIST

## 2019-08-29 PROCEDURE — 97162 PT EVAL MOD COMPLEX 30 MIN: CPT | Performed by: PHYSICAL THERAPIST

## 2019-08-29 NOTE — THERAPY EVALUATION
Outpatient Physical Therapy Ortho Initial Evaluation  [facility]     Patient Name: Hellen Delgado  : 1950  MRN: [id number]  Today's Date: 2019      Visit Date: 2019    Patient Active Problem List   Diagnosis   • Current tear of meniscus of knee   • S/P arthroscopy of knee   • Primary localized osteoarthrosis of left lower leg   • Primary osteoarthritis of left knee   • Chronic pain of left knee        Past Medical History:   Diagnosis Date   • Anxiety    • Arthritis    • Bronchitis    • Depression    • Disease of thyroid gland     underactive thyroid   • GERD (gastroesophageal reflux disease)    • Hypercholesteremia    • Left knee pain    • Migraines    • Osteopenia    • PONV (postoperative nausea and vomiting)    • Sleep apnea     CPAP   • Vertigo         Past Surgical History:   Procedure Laterality Date   • BACK SURGERY  2008   • COLONOSCOPY     • FOREIGN BODY REMOVAL      LEFT FOOT   • GALLBLADDER SURGERY     • HYSTERECTOMY     • KNEE ARTHROSCOPY  1988   • KNEE ARTHROSCOPY Left 2016    Procedure: LEFT KNEE ARTHROSCOPY AND PARTIAL MEDIAL AND LATERAL MENISECTOMIES; DEBRIDEMENT ARTHRITIS;  Surgeon: Starr Thomas MD;  [place] [facility], [place] operated by [facility];  Service:    • THORACIC SPINE SURGERY  2001   • TOTAL KNEE ARTHROPLASTY Left 2019    Procedure: TOTAL KNEE ARTHROPLASTY;  Surgeon: Stan Kinsey MD;  Location: [facility];  Service: Orthopedics       Visit Dx:     ICD-10-CM ICD-9-CM   1. Status post total left knee replacement Z96.652 V43.65   2. Acute pain of left knee M25.562 719.46   3. Abnormal gait R26.9 781.2         Patient History     Row Name 19 0700             History    Chief Complaint  Difficulty Walking;Difficulty with daily activities;Pain;Swelling  -      Date Current Problem(s) Began  19  -      Brief Description of Current Complaint  Pt started with L knee pain 5 years ago. Previous scope for meniscus tear. She had been getting  injections until they stopped working. She had a L TKA on 8/14. No problems in the R knee. She is mostly using the SC for walking, walker at night. She is retired. She lives with her  in a one level home. She is taking pain meds less often now. HHPT was at home yesterday  -      Previous treatment for THIS PROBLEM  Injections;Surgery;Medication;Rehabilitation  -      Surgery Date:  08/14/19  -      Patient/Caregiver Goals  Relieve pain;Return to prior level of function;Improve mobility;Improve strength  -      Patient's Rating of General Health  Good  -      Occupation/sports/leisure activities  retired; walking, travel, granddaughter  -      How has patient tried to help current problem?  ice, rest  -         Pain     Pain Location  Knee  -      Pain at Present  3  -      Pain at Best  2  -      Pain at Worst  3  -      Pain Frequency  Constant/continuous  -      Pain Description  Aching;Sore  -      What Performance Factors Make the Current Problem(s) WORSE?  up to long, pivoting, sleeping, bed mobility  -      What Performance Factors Make the Current Problem(s) BETTER?  rest, ice, elevate  -      Is your sleep disturbed?  Yes  -         Fall Risk Assessment    Any falls in the past year:  No  -         Services    Prior Rehab/Home Health Experiences  Yes  -         Daily Activities    Primary Language  English  -      Teaching needs identified  Home Exercise Program;Management of Condition  -      Patient is concerned about/has problems with  Difficulty with self care (i.e. bathing, dressing, toileting:;Performing home management (household chores, shopping, care of dependents);Transfers (getting out of a chair, bed);Walking;Standing  -      Barriers to learning  None  -      Pt Participated in POC and Goals  Yes  -         Safety    Are you being hurt, hit, or frightened by anyone at home or in your life?  No  -LH      Are you being neglected by a caregiver   No  -LH        User Key  (r) = Recorded By, (t) = Taken By, (c) = Cosigned By    Initials Name Provider Type    Sierra Vickers PT Physical Therapist          PT Ortho     Row Name 08/29/19 0800       Posture/Observations    Observations  Incision healing;Edema;Erythema steri strips intact  -LH       Right Lower Ext    Rt Knee Extension/Flexion AROM  ext=0 deg, flex=130 deg  -LH    RT Lower Extremity Comments  flex in sitting, ext supine  -LH       Left Lower Ext    Lt Knee Extension/Flexion AROM  ext=8 deg, flex=90 deg  -LH    Lt Knee Extension/Flexion PROM  flex=107 deg  -LH    LT Lower Extremity Comments  in sitting  -LH       MMT Right Lower Ext    Rt Hip Flexion MMT, Gross Movement  (4-/5) good minus  -LH    Rt Hip ABduction MMT, Gross Movement  (4/5) good  -LH    Rt Knee Extension MMT, Gross Movement  (4+/5) good plus  -LH    Rt Knee Flexion MMT, Gross Movement  (4/5) good  -LH       MMT Left Lower Ext    Lt Hip Flexion MMT, Gross Movement  (3/5) fair  -LH    Lt Hip ABduction MMT, Gross Movement  (3-/5) fair minus  -LH    Lt Knee Extension MMT, Gross Movement  (3-/5) fair minus  -LH    Lt Knee Flexion MMT, Gross Movement  (2+/5) poor plus  -LH    Lt Lower Extremity Comments   pt with a 17 deg quad lag with SLR  -LH       Lower Extremity Flexibility    Hamstrings  Left:;Mildly limited;Moderately limited  -    Gastrocnemius  Left:;Mildly limited  -LH       Gait/Stairs Assessment/Training    Comment (Gait/Stairs)  mild antalgia with SC, decreased stance time on L LE, dec TKE  -LH      User Key  (r) = Recorded By, (t) = Taken By, (c) = Cosigned By    Initials Name Provider Type    Sierra Vickers PT Physical Therapist                      Therapy Education  Given: HEP, Symptoms/condition management, Pain management  Program: New  How Provided: Verbal, Written  Provided to: Patient, Caregiver  Level of Understanding: Teach back education performed, Verbalized, Demonstrated     PT OP Goals     Row Name  08/29/19 0700          PT Short Term Goals    STG 1  Patient will be independent with education for symptom management, joint protection and strategies to minimize stress on affected tissues  -     STG 1 Progress  New  -     STG 2  Pt to improve knee ROM to ext=4 deg and flex=105 deg  -     STG 2 Progress  New  -     STG 3  Patient will ambulate without assistive device community distances with near normal gait  -     STG 3 Progress  New  [facility]     STG 4  Pt to have a 10 deg or less quad lag with SLR  -     STG 4 Progress  New  [facility]     STG 5  Pt to complete KOS next visit  -     STG 5 Progress  New  [facility]        Long Term Goals    LTG 1  Pt to improve knee ROM to ext=2 deg and flex=115 deg  -     LTG 1 Progress  New  -     LTG 2  Patient will increase knee strength to 4 to 4+/5 to improve functional mobility  -     LTG 2 Progress  New  -     LTG 3  Pt to improve score on Knee Outcome Survey by 20% for overall functional improvement  -     LTG 3 Progress  New  -     LTG 4  Patient will demonstrate an independent HEP for core and knee strength and flexibility/ROM.  -     LTG 4 Progress  New  [facility]     LTG 5  Pt to have no quad lag with SLR  -     LTG 5 Progress  New  -LH        Time Calculation    PT Goal Re-Cert Due Date  11/29/19  -       User Key  (r) = Recorded By, (t) = Taken By, (c) = Cosigned By    Initials Name Provider Type     Sierra Ortega, PT Physical Therapist          PT Assessment/Plan     Row Name 08/29/19 1300          PT Assessment    Functional Limitations  Impaired gait;Limitation in home management;Limitations in community activities;Limitations in functional capacity and performance;Performance in leisure activities  -     Impairments  Edema;Gait;Impaired flexibility;Muscle strength;Pain;Range of motion  -     Assessment Comments  Hellen Delgado is a 68 y.o. year-old female referred to physical therapy for s/p left TKA on 8/14/19. She presents with a  evolving clinical presentation.  She has no comorbidities and personal factors that may affect her progress in the plan of care.  Pt has decreased ROM in ext=8 deg and flex=90/107 deg, 3/5, and decreased flexibility of the HS and gastroc/soleus complex. She has a significant quad lag of 17 deg with SLR. She is ambulating shorter distances with a SC.  Pt would benefit from therapy to help improve her ability to walk, perform stairs, transfer, and play with her granddaughter.  -     Please refer to paper survey for additional self-reported information  Yes  -     Rehab Potential  Good  -     Patient/caregiver participated in establishment of treatment plan and goals  Yes  -     Patient would benefit from skilled therapy intervention  Yes  -        PT Plan    PT Frequency  3x/week  -     Predicted Duration of Therapy Intervention (Therapy Eval)  6-8 weeks  -     Planned CPT's?  PT EVAL MOD COMPLELITY: 20135;PT THER ACT EA 15 MIN: 59754;PT THER PROC EA 15 MIN: 81537;PT MANUAL THERAPY EA 15 MIN: 86087;PT NEUROMUSC RE-EDUCATION EA 15 MIN: 11506;PT GAIT TRAINING EA 15 MIN: 56067;PT HOT OR COLD PACK TREAT MCARE;PT ELECTRICAL STIM UNATTEND:   -     Physical Therapy Interventions (Optional Details)  balance training;gait training;home exercise program;joint mobilization;manual therapy techniques;modalities;neuromuscular re-education;patient/family education;ROM (Range of Motion);stair training;strengthening;stretching;transfer training  -     PT Plan Comments  plan to see pt 3x week for ROM, strength, and gait following L TKA. May consider use estim for NM re-ed  -       User Key  (r) = Recorded By, (t) = Taken By, (c) = Cosigned By    Initials Name Provider Type     Sierra Ortega, PT Physical Therapist            Exercises     Row Name 08/29/19 0800             Total Minutes    31558 - PT Therapeutic Exercise Minutes  20  -         Exercise 1    Exercise Name 1  QS  -      Sets 1  1  -       Reps 1  10  -LH      Time 1  5 sec  -LH         Exercise 2    Exercise Name 2  SAQ w/assist  -LH      Sets 2  1  -LH      Reps 2  10  -LH         Exercise 3    Exercise Name 3  SLR from leg on 2 pillows  -LH      Cueing 3  Verbal tighten quad, slowly lower  -LH      Sets 3  2  -LH      Reps 3  5  -LH         Exercise 4    Exercise Name 4  hip abd supine w/assist  -LH      Sets 4  1  -LH      Reps 4  10  -LH         Exercise 5    Exercise Name 5  supine heel slides with strap  -LH      Sets 5  1  -LH      Reps 5  10  -LH      Time 5  10 sec  -LH         Exercise 6    Exercise Name 6  LAQ  -LH      Sets 6  1  -LH      Reps 6  10  -LH         Exercise 7    Exercise Name 7  seated HS curls  -LH      Sets 7  1  -LH      Reps 7  10  -LH         Exercise 8    Exercise Name 8  longsitting HS and calf stretching  -LH      Sets 8  1  -LH      Reps 8  3  -LH      Time 8  20 sec  -LH        User Key  (r) = Recorded By, (t) = Taken By, (c) = Cosigned By    Initials Name Provider Type     Sierra Ortega, PT Physical Therapist                        Outcome Measure Options: Knee Outcome Score- ADL  Knee Outcome Score  Knee Outcome Score Comments: will complete next visit      Time Calculation:     Start Time: 0800  Stop Time: 0845  Time Calculation (min): 45 min  Total Timed Code Minutes- PT: 45 minute(s)     Therapy Charges for Today     Code Description Service Date Service Provider Modifiers Qty    34111747258  PT THER PROC EA 15 MIN 8/29/2019 Sierra Ortega, PT GP 1    [id number]  PT EVAL MOD COMPLEXITY 2 8/29/2019 Sierra Ortega, PT GP 1          PT G-Codes  Outcome Measure Options: Knee Outcome Score- ADL         Sierra Ortega, PT  8/29/2019

## 2019-08-30 ENCOUNTER — HOSPITAL ENCOUNTER (OUTPATIENT)
Dept: PHYSICAL THERAPY | Facility: HOSPITAL | Age: 69
Setting detail: THERAPIES SERIES
Discharge: HOME | End: 2019-08-30

## 2019-08-30 DIAGNOSIS — M25.562 ACUTE PAIN OF LEFT KNEE: ICD-10-CM

## 2019-08-30 DIAGNOSIS — Z96.652 STATUS POST TOTAL LEFT KNEE REPLACEMENT: Primary | ICD-10-CM

## 2019-08-30 DIAGNOSIS — R26.9 ABNORMAL GAIT: ICD-10-CM

## 2019-08-30 PROCEDURE — 97140 MANUAL THERAPY 1/> REGIONS: CPT | Performed by: PHYSICAL THERAPIST

## 2019-08-30 PROCEDURE — 97110 THERAPEUTIC EXERCISES: CPT | Performed by: PHYSICAL THERAPIST

## 2019-08-30 RX ORDER — DOCUSATE SODIUM 100 MG/1
CAPSULE, LIQUID FILLED ORAL
Qty: 26 CAPSULE | Refills: 0 | Status: SHIPPED | OUTPATIENT
Start: 2019-08-30 | End: 2020-08-11

## 2019-08-30 NOTE — THERAPY TREATMENT NOTE
Outpatient Physical Therapy Ortho Treatment Note  [facility]     Patient Name: Hellen Delgado  : 1950  MRN: [id number]  Today's Date: 2019      Visit Date: 2019    Visit Dx:    ICD-10-CM ICD-9-CM   1. Status post total left knee replacement Z96.652 V43.65   2. Acute pain of left knee M25.562 719.46   3. Abnormal gait R26.9 781.2       Patient Active Problem List   Diagnosis   • Current tear of meniscus of knee   • S/P arthroscopy of knee   • Primary localized osteoarthrosis of left lower leg   • Primary osteoarthritis of left knee   • Chronic pain of left knee        Past Medical History:   Diagnosis Date   • Anxiety    • Arthritis    • Bronchitis    • Depression    • Disease of thyroid gland     underactive thyroid   • GERD (gastroesophageal reflux disease)    • Hypercholesteremia    • Left knee pain    • Migraines    • Osteopenia    • PONV (postoperative nausea and vomiting)    • Sleep apnea     CPAP   • Vertigo         Past Surgical History:   Procedure Laterality Date   • BACK SURGERY  2008   • COLONOSCOPY     • FOREIGN BODY REMOVAL      LEFT FOOT   • GALLBLADDER SURGERY     • HYSTERECTOMY     • KNEE ARTHROSCOPY  1988   • KNEE ARTHROSCOPY Left 2016    Procedure: LEFT KNEE ARTHROSCOPY AND PARTIAL MEDIAL AND LATERAL MENISECTOMIES; DEBRIDEMENT ARTHRITIS;  Surgeon: Starr Thomas MD;  Location: [facility];  Service:    • THORACIC SPINE SURGERY  2001   • TOTAL KNEE ARTHROPLASTY Left 2019    Procedure: TOTAL KNEE ARTHROPLASTY;  Surgeon: Stan Kinsey MD;  Location: [facility];  Service: Orthopedics       PT Ortho     Row Name 19 0800       Posture/Observations    Observations  Incision healing;Edema;Erythema steri strips intact  -LH       Right Lower Ext    Rt Knee Extension/Flexion AROM  ext=0 deg, flex=130 deg  -LH    RT Lower Extremity Comments  flex in sitting, ext supine  -LH       Left Lower Ext    Lt Knee Extension/Flexion AROM  ext=8 deg,  flex=90 deg  -LH    Lt Knee Extension/Flexion PROM  flex=107 deg  -LH    LT Lower Extremity Comments  in sitting  -LH       MMT Right Lower Ext    Rt Hip Flexion MMT, Gross Movement  (4-/5) good minus  -LH    Rt Hip ABduction MMT, Gross Movement  (4/5) good  -LH    Rt Knee Extension MMT, Gross Movement  (4+/5) good plus  -LH    Rt Knee Flexion MMT, Gross Movement  (4/5) good  -LH       MMT Left Lower Ext    Lt Hip Flexion MMT, Gross Movement  (3/5) fair  -LH    Lt Hip ABduction MMT, Gross Movement  (3-/5) fair minus  -LH    Lt Knee Extension MMT, Gross Movement  (3-/5) fair minus  -LH    Lt Knee Flexion MMT, Gross Movement  (2+/5) poor plus  -LH    Lt Lower Extremity Comments   pt with a 17 deg quad lag with SLR  -LH       Lower Extremity Flexibility    Hamstrings  Left:;Mildly limited;Moderately limited  -LH    Gastrocnemius  Left:;Mildly limited  -LH       Gait/Stairs Assessment/Training    Comment (Gait/Stairs)  mild antalgia with SC, decreased stance time on L LE, dec TKE  -LH      User Key  (r) = Recorded By, (t) = Taken By, (c) = Cosigned By    Initials Name Provider Type     Sierra Ortega, PT Physical Therapist                      PT Assessment/Plan     Row Name 08/30/19 6060          PT Assessment    Assessment Comments  Patient compliant with HEP, icing/elevating knee.  She continues to report sleep difficulty 2/2 knee pain (not able to get/stay comfortable).  Continued with previous ther ex increasing reps on some.  Patient able to perform previously assisted ther ex without assist but continued with assist to reduce pain/strain (2/2 impaired quad activation).  Added seated TKE into ball against wall with less pain/discomfort and reported feeling thigh muscle activation and posterior knee stretch.    -RA        PT Plan    PT Plan Comments  Continue skilled PT working on knee ROM, strength, functional mobility, and gait.  Possible trial on NMES for improved quad recruitment with knee ext ex.    -RA        User Key  (r) = Recorded By, (t) = Taken By, (c) = Cosigned By    Initials Name Provider Type    RA Veronica Banerjee, PT Physical Therapist            Exercises     Row Name 08/30/19 1100 08/30/19 1000          Subjective Comments    Subjective Comments  --  Knee stiff/sore, still have difficulty getting/staying comfortable to sleep at night (although seems to be a little better).  Iva Roberts this morning.  -RA        Subjective Pain    Able to rate subjective pain?  --  yes  -RA     Pre-Treatment Pain Level  --  2  -RA     Subjective Pain Comment  --  increases with ex/activity   -RA        Total Minutes    34393 - PT Therapeutic Exercise Minutes  --  30  -RA     38188 - PT Manual Therapy Minutes  15  -RA  --        Exercise 1    Exercise Name 1  --  QS  -RA     Sets 1  --  2  -RA     Reps 1  --  10  -RA     Time 1  --  5 sec  -RA        Exercise 2    Exercise Name 2  --  SAQ w/assist  -RA     Sets 2  --  1  -RA     Reps 2  --  10  -RA        Exercise 3    Exercise Name 3  --  SLR from leg on 2 pillows  -RA     Cueing 3  --  Verbal tighten quad, slowly lower  -RA     Sets 3  --  3  -RA     Reps 3  --  5  -RA        Exercise 4    Exercise Name 4  --  hip abd supine w/assist  -RA     Sets 4  --  2  -RA     Reps 4  --  10  -RA        Exercise 5    Exercise Name 5  --  supine heel slides with strap  -RA     Sets 5  --  1  -RA     Reps 5  --  10  -RA     Time 5  --  10 sec  -RA        Exercise 6    Exercise Name 6  --  LAQ  -RA     Sets 6  --  2  -RA     Reps 6  --  10  -RA        Exercise 7    Exercise Name 7  --  seated HS curls  -RA     Sets 7  --  2  -RA     Reps 7  --  10  -RA        Exercise 8    Exercise Name 8  --  longsitting HS and calf stretching  -RA     Sets 8  --  1  -RA     Reps 8  --  3  -RA     Time 8  --  20 sec  -RA        Exercise 9    Exercise Name 9  --  seated TKE into ball against wall  -RA     Reps 9  --  10  -RA     Time 9  --  5 sec  -RA       User Key  (r) = Recorded By, (t) = Taken  "By, (c) = Cosigned By    Initials Name Provider Type    Veronica Conway PT Physical Therapist                      Manual Rx (last 36 hours)      Manual Treatments     Row Name 08/30/19 1100             Total Minutes    17077 - PT Manual Therapy Minutes  15  -RA         Manual Rx 1    Manual Rx 1 Location  patellar mobilizations   -RA         Manual Rx 2    Manual Rx 2 Location  Passive knee extension stretching 10\" x 7  -RA         Manual Rx 3    Manual Rx 3 Location  Passive knee flexion stretching 10\" x 8  -RA         Manual Rx 4    Manual Rx 4 Location  quad/HS tissues   -RA      Manual Rx 4 Type  gentle STM   -RA      Manual Rx 4 Duration  intermittently b/ passive stretches  -RA        User Key  (r) = Recorded By, (t) = Taken By, (c) = Cosigned By    Initials Name Provider Type    Veronica Conway PT Physical Therapist              Therapy Education  Given: HEP, Symptoms/condition management, Pain management, Mobility training  Program: Reinforced  How Provided: Verbal, Demonstration  Provided to: Patient  Level of Understanding: Teach back education performed, Verbalized              Time Calculation:   Start Time: 1048  Stop Time: 1133  Time Calculation (min): 45 min  Therapy Charges for Today     Code Description Service Date Service Provider Modifiers Qty    [id number]  PT THER PROC EA 15 MIN 8/30/2019 Veronica Banerjee, PT GP 2    [id number]  PT MANUAL THERAPY EA 15 MIN 8/30/2019 Veronica Banerjee, PT GP 1                    Veronica Banerjee PT  8/30/2019     "

## 2019-09-04 ENCOUNTER — TREATMENT (OUTPATIENT)
Dept: PHYSICAL THERAPY | Facility: CLINIC | Age: 69
End: 2019-09-04

## 2019-09-04 DIAGNOSIS — M25.562 ACUTE PAIN OF LEFT KNEE: ICD-10-CM

## 2019-09-04 DIAGNOSIS — R26.9 ABNORMAL GAIT: ICD-10-CM

## 2019-09-04 DIAGNOSIS — Z96.652 STATUS POST TOTAL LEFT KNEE REPLACEMENT: Primary | ICD-10-CM

## 2019-09-04 PROCEDURE — 97110 THERAPEUTIC EXERCISES: CPT | Performed by: PHYSICAL THERAPIST

## 2019-09-04 NOTE — PROGRESS NOTES
Physical Therapy Daily Progress Note    Patient: Hellen Delgado   : 1950  Diagnosis/ICD-10 Code:  Status post total left knee replacement [Z96.652]  Referring practitioner: Stan Kinsey MD  Date of Initial Visit: Type: THERAPY  Noted: 2019  Today's Date: 2019  Patient seen for 3 sessions           Subjective Evaluation    History of Present Illness    Subjective comment: i am doing better. Pain about a 2-3/10       Objective       Active Range of Motion   Left Knee   Flexion: 102 degrees   Extension: 5 degrees   Extensor la degrees      See Exercise, Manual, and Modality Logs for complete treatment.       Assessment & Plan     Assessment  Assessment details: Pt has improved with her ROM and strength over the past week. She has improved to a 13 deg quad lag with SLR. She is able to perform SLR without assist from 2 pillows height now. Started pt on NuStep today and showed her how to set up for next visit if she arrives early               Timed:    Manual Therapy:    0     mins  17136;  Therapeutic Exercise:    43     mins  10030;     Neuromuscular John:    0    mins  82357;    Therapeutic Activity:     0     mins  71853;     Gait Trainin     mins  53243;     Ultrasound:     0     mins  71343;    Electrical Stimulation:    0     mins  69911 ( );  Iontophoresis    0     mins 80266;  Aquatic Therapy    0     mins 18333;    Untimed:  Electrical Stimulation:    0     mins  66524 ( );  Mechanical Traction:    0     mins  78680;     Timed Treatment:   43   mins   Total Treatment:     43   mins  Sierra Ortega, PT  Physical Therapist

## 2019-09-06 ENCOUNTER — OFFICE VISIT (OUTPATIENT)
Dept: PHYSICAL THERAPY | Facility: CLINIC | Age: 69
End: 2019-09-06

## 2019-09-06 DIAGNOSIS — Z96.652 STATUS POST TOTAL LEFT KNEE REPLACEMENT: Primary | ICD-10-CM

## 2019-09-06 DIAGNOSIS — R26.9 ABNORMAL GAIT: ICD-10-CM

## 2019-09-06 DIAGNOSIS — M25.562 ACUTE PAIN OF LEFT KNEE: ICD-10-CM

## 2019-09-06 PROCEDURE — 97110 THERAPEUTIC EXERCISES: CPT | Performed by: PHYSICAL THERAPIST

## 2019-09-06 NOTE — PROGRESS NOTES
Physical Therapy Daily Progress Note    Patient: Hellen Delgado   : 1950  Diagnosis/ICD-10 Code:  Status post total left knee replacement [Z96.652]  Referring practitioner: Stan Kinsey MD  Date of Initial Visit: Type: THERAPY  Noted: 2019  Today's Date: 2019  Patient seen for 4 sessions           Subjective Evaluation    History of Present Illness    Subjective comment: I was at my grandchild's school this am for a little over an hour, it really made me tiredPain  Current pain ratin           Objective   See Exercise, Manual, and Modality Logs for complete treatment.       Assessment & Plan     Assessment  Assessment details: Pt able to increase her resistance on SAQ and LAQ today without pain. She is still using 2 pillows to lift from for SLR, but no assist needed. Started 3 in step ups today using her SC for balance. Plan to add the [employer] leg press next visit               Timed:    Manual Therapy:    0     mins  57594;  Therapeutic Exercise:    45     mins  79003;     Neuromuscular John:    0    mins  15260;    Therapeutic Activity:     0     mins  12689;     Gait Trainin     mins  42916;     Ultrasound:     0     mins  38223;    Electrical Stimulation:    0     mins  92847 ( );  Iontophoresis    0     mins 18947;  Aquatic Therapy    0     mins 21296;    Untimed:  Electrical Stimulation:    0     mins  00043 ( );  Mechanical Traction:    0     mins  01784;     Timed Treatment:   45   mins   Total Treatment:     45   mins  Sierra Ortega, PT  Physical Therapist

## 2019-09-09 ENCOUNTER — TREATMENT (OUTPATIENT)
Dept: PHYSICAL THERAPY | Facility: CLINIC | Age: 69
End: 2019-09-09

## 2019-09-09 DIAGNOSIS — G89.29 CHRONIC PAIN OF LEFT KNEE: ICD-10-CM

## 2019-09-09 DIAGNOSIS — Z96.652 STATUS POST TOTAL LEFT KNEE REPLACEMENT: ICD-10-CM

## 2019-09-09 DIAGNOSIS — R26.9 ABNORMAL GAIT: ICD-10-CM

## 2019-09-09 DIAGNOSIS — M25.562 ACUTE PAIN OF LEFT KNEE: Primary | ICD-10-CM

## 2019-09-09 DIAGNOSIS — M25.562 CHRONIC PAIN OF LEFT KNEE: ICD-10-CM

## 2019-09-09 PROCEDURE — 97110 THERAPEUTIC EXERCISES: CPT | Performed by: PHYSICAL THERAPIST

## 2019-09-09 NOTE — PROGRESS NOTES
Physical Therapy Daily Progress Note    Patient: Hellen Delgado   : 1950  Diagnosis/ICD-10 Code:  Acute pain of left knee [M25.562]  Referring practitioner: Stan Kinsey MD  Date of Initial Visit: Type: THERAPY  Noted: 2019  Today's Date: 2019  Patient seen for 5 sessions           Subjective Evaluation    History of Present Illness    Subjective comment: I went to [facility] yesterday and I'm hurting more than normal today.Pain  Current pain rating: 3           Objective   See Exercise, Manual, and Modality Logs for complete treatment.       Assessment & Plan     Assessment  Assessment details: Patient in more pain than normal today from increased ambulation over the weekend. Performed exercises slowly with emphasis on maximal ROM to help lessen stiffness. Progressed to performing SLR from one pillow however patient was unable to lift leg all the way to height of opposite bent knee.                Timed:    Manual Therapy:         mins  62106;  Therapeutic Exercise:    40     mins  16955;     Neuromuscular John:        mins  35395;    Therapeutic Activity:          mins  28690;     Gait Training:           mins  72810;     Ultrasound:          mins  13400;    Electrical Stimulation:         mins  66385 ( );  Iontophoresis         mins 02636;  Aquatic Therapy         mins 41402;    Untimed:  Electrical Stimulation:         mins  09614 ( );  Mechanical Traction:         mins  66692;     Timed Treatment:   40   mins   Total Treatment:     40   mins  Rohini Mendoza PT  Physical Therapist

## 2019-09-11 ENCOUNTER — TREATMENT (OUTPATIENT)
Dept: PHYSICAL THERAPY | Facility: CLINIC | Age: 69
End: 2019-09-11

## 2019-09-11 DIAGNOSIS — Z96.652 STATUS POST TOTAL LEFT KNEE REPLACEMENT: ICD-10-CM

## 2019-09-11 DIAGNOSIS — R26.9 ABNORMAL GAIT: ICD-10-CM

## 2019-09-11 DIAGNOSIS — M25.562 ACUTE PAIN OF LEFT KNEE: Primary | ICD-10-CM

## 2019-09-11 PROCEDURE — 97110 THERAPEUTIC EXERCISES: CPT | Performed by: PHYSICAL THERAPIST

## 2019-09-11 NOTE — PROGRESS NOTES
Physical Therapy Daily Progress Note    Patient: Hellen Delgado   : 1950  Diagnosis/ICD-10 Code:  Acute pain of left knee [M25.562]  Referring practitioner: Stan Kinsey MD  Date of Initial Visit: Type: THERAPY  Noted: 2019  Today's Date: 2019  Patient seen for 6 sessions           Subjective Evaluation    History of Present Illness    Subjective comment: my pain is better today, i went to [facility]'s on  and was really sore on Monday       Objective       Active Range of Motion   Left Knee   Flexion: 107 degrees   Extension: 7 degrees   Extensor la degrees     Passive Range of Motion   Left Knee   Flexion: 115 degrees      See Exercise, Manual, and Modality Logs for complete treatment.       Assessment & Plan     Assessment  Assessment details: Pt has improved her quad lag from 17 deg to 8 deg over the past couple of weeks. She is now able to perform SLR from 1 pillow height, but it is a bit of a struggle. Added SL press on the Gisela today               Timed:    Manual Therapy:    0     mins  16777;  Therapeutic Exercise:    45     mins  01756;     Neuromuscular John:    0    mins  11381;    Therapeutic Activity:     0     mins  01846;     Gait Trainin     mins  37944;     Ultrasound:     0     mins  20459;    Electrical Stimulation:    0     mins  95226 ( );  Iontophoresis    0     mins 41886;  Aquatic Therapy    0     mins 46639;    Untimed:  Electrical Stimulation:    0     mins  61823 ( );  Mechanical Traction:    0     mins  80133;     Timed Treatment:   45   mins   Total Treatment:     45   mins  Sierra Ortega, PT  Physical Therapist

## 2019-09-13 ENCOUNTER — OFFICE VISIT (OUTPATIENT)
Dept: PHYSICAL THERAPY | Facility: CLINIC | Age: 69
End: 2019-09-13

## 2019-09-13 DIAGNOSIS — Z96.652 STATUS POST TOTAL LEFT KNEE REPLACEMENT: ICD-10-CM

## 2019-09-13 DIAGNOSIS — R26.9 ABNORMAL GAIT: ICD-10-CM

## 2019-09-13 DIAGNOSIS — M25.562 ACUTE PAIN OF LEFT KNEE: Primary | ICD-10-CM

## 2019-09-13 PROCEDURE — 97110 THERAPEUTIC EXERCISES: CPT | Performed by: PHYSICAL THERAPIST

## 2019-09-13 NOTE — PROGRESS NOTES
Physical Therapy Daily Progress Note    Patient: Hellen Delgado   : 1950  Diagnosis/ICD-10 Code:  Acute pain of left knee [M25.562]  Referring practitioner: Stan Kinsey MD  Date of Initial Visit: Type: THERAPY  Noted: 2019  Today's Date: 2019  Patient seen for 7 sessions           Subjective Evaluation    History of Present Illness    Subjective comment: I was getting out of car and twisted the R hip a bit and it is really sore today       Objective       Active Range of Motion   Left Knee   Flexion: 110 degrees      See Exercise, Manual, and Modality Logs for complete treatment.       Assessment & Plan     Assessment  Assessment details: Pt was getting out of the car yesterday and slightly twisted her R hip. She had tenderness of the GT and along the ITBand and piriformis. I had her perform stretching for this area and it did help her. Also advised to ice. Today we did a 4 inch step down for the first time today. She did need SBA, but not use a handrail or her cane.                Timed:    Manual Therapy:    0     mins  42898;  Therapeutic Exercise:    45     mins  41446;     Neuromuscular John:    0    mins  76479;    Therapeutic Activity:     0     mins  53369;     Gait Trainin     mins  77039;     Ultrasound:     0     mins  19990;    Electrical Stimulation:    0     mins  80761 ( );  Iontophoresis    0     mins 04514;  Aquatic Therapy    0     mins 61376;    Untimed:  Electrical Stimulation:    0     mins  93261 ( );  Mechanical Traction:    0     mins  60975;     Timed Treatment:   45   mins   Total Treatment:     45   mins  Sierra Ortega PT  Physical Therapist

## 2019-09-16 ENCOUNTER — TREATMENT (OUTPATIENT)
Dept: PHYSICAL THERAPY | Facility: CLINIC | Age: 69
End: 2019-09-16

## 2019-09-16 DIAGNOSIS — R26.9 ABNORMAL GAIT: ICD-10-CM

## 2019-09-16 DIAGNOSIS — Z96.652 STATUS POST TOTAL LEFT KNEE REPLACEMENT: ICD-10-CM

## 2019-09-16 DIAGNOSIS — M25.562 ACUTE PAIN OF LEFT KNEE: Primary | ICD-10-CM

## 2019-09-16 PROCEDURE — 97110 THERAPEUTIC EXERCISES: CPT | Performed by: PHYSICAL THERAPIST

## 2019-09-16 NOTE — PROGRESS NOTES
Physical Therapy Daily Progress Note    Patient: Hellen Delgado   : 1950  Diagnosis/ICD-10 Code:  Acute pain of left knee [M25.562]  Referring practitioner: Stan Kinsey MD  Date of Initial Visit: Type: THERAPY  Noted: 2019  Today's Date: 2019  Patient seen for 8 sessions           Subjective Evaluation    History of Present Illness    Subjective comment: pt has been having some pain in the medial knee, sharp at times, but it just comes and goes.        Objective       Active Range of Motion   Left Knee   Flexion: 107 degrees     Additional Active Range of Motion Details  Foot off floor     See Exercise, Manual, and Modality Logs for complete treatment.       Assessment & Plan     Assessment  Assessment details: Pt with tenderness over the L fibular head with pain. She has decreased mobiltiy but did not tolerate light joint mob. Had pt perform standing calf raises and she did have a pop at the fibular head and it felt better.                Timed:    Manual Therapy:    5     mins  49414;  Therapeutic Exercise:    40     mins  13635;     Neuromuscular John:    0    mins  98741;    Therapeutic Activity:     0     mins  82881;     Gait Trainin     mins  04834;     Ultrasound:     0     mins  34884;    Electrical Stimulation:    0     mins  79183 ( );  Iontophoresis    0     mins 93463;  Aquatic Therapy    0     mins 81281;    Untimed:  Electrical Stimulation:    0     mins  39435 ( );  Mechanical Traction:    0     mins  43808;     Timed Treatment:   45   mins   Total Treatment:     45   mins  Sierra Ortega, PT  Physical Therapist

## 2019-09-18 ENCOUNTER — TREATMENT (OUTPATIENT)
Dept: PHYSICAL THERAPY | Facility: CLINIC | Age: 69
End: 2019-09-18

## 2019-09-18 DIAGNOSIS — Z96.652 STATUS POST TOTAL LEFT KNEE REPLACEMENT: ICD-10-CM

## 2019-09-18 DIAGNOSIS — M25.562 ACUTE PAIN OF LEFT KNEE: Primary | ICD-10-CM

## 2019-09-18 DIAGNOSIS — R26.9 ABNORMAL GAIT: ICD-10-CM

## 2019-09-18 PROCEDURE — 97110 THERAPEUTIC EXERCISES: CPT | Performed by: PHYSICAL THERAPIST

## 2019-09-18 NOTE — PROGRESS NOTES
Physical Therapy Daily Progress Note    Patient: Hellen Delgado   : 1950  Diagnosis/ICD-10 Code:  Acute pain of left knee [M25.562]  Referring practitioner: Stan Kinsey MD  Date of Initial Visit: Type: THERAPY  Noted: 2019  Today's Date: 2019  Patient seen for 9 sessions           Subjective Evaluation    History of Present Illness    Subjective comment: the knee is doing well, the R hip is still really sore       Objective   See Exercise, Manual, and Modality Logs for complete treatment.       Assessment & Plan     Assessment  Assessment details: Pt incision is well healed. Shown scar massage and mobility today. Added flexion stretch on height of the 2nd step. Pt getting more fluid with 4 in step up and stronger with SLR               Timed:    Manual Therapy:         mins  91364;  Therapeutic Exercise:    45     mins  13028;     Neuromuscular John:        mins  88007;    Therapeutic Activity:          mins  71564;     Gait Training:           mins  48631;     Ultrasound:          mins  36651;    Electrical Stimulation:         mins  33716 ( );  Iontophoresis         mins 39262;  Aquatic Therapy         mins 22501;    Untimed:  Electrical Stimulation:        mins  37026 ( );  Mechanical Traction:         mins  76121;     Timed Treatment:   45   mins   Total Treatment:     45   mins  Sierra Ortega, PT  Physical Therapist

## 2019-09-20 ENCOUNTER — TREATMENT (OUTPATIENT)
Dept: PHYSICAL THERAPY | Facility: CLINIC | Age: 69
End: 2019-09-20

## 2019-09-20 DIAGNOSIS — M25.562 ACUTE PAIN OF LEFT KNEE: Primary | ICD-10-CM

## 2019-09-20 DIAGNOSIS — Z96.652 STATUS POST TOTAL LEFT KNEE REPLACEMENT: ICD-10-CM

## 2019-09-20 DIAGNOSIS — R26.9 ABNORMAL GAIT: ICD-10-CM

## 2019-09-20 PROCEDURE — 97110 THERAPEUTIC EXERCISES: CPT | Performed by: PHYSICAL THERAPIST

## 2019-09-20 NOTE — PROGRESS NOTES
Re-Assessment / Re-Certification        Patient: Hellen Delgado   : 1950  Diagnosis/ICD-10 Code:  Acute pain of left knee [M25.562]  Referring practitioner: Stan Kinsey MD  Date of Initial Visit: Type: THERAPY  Noted: 2019  Today's Date: 2019  Patient seen for 10 sessions      Subjective:     Clinical Progress: improved  Home Program Compliance: Yes  Treatment has included:  therapeutic exercise, manual therapy, therapeutic activity, gait training and patient education with home exercise program     Subjective   Objective   Assessment & Plan     Assessment  Assessment details: Hellen Delgado has been seen for 10 physical therapy sessions for s/p L TKA.  Treatment has included therapeutic exercise, manual therapy, therapeutic activity, gait training and patient education with home exercise program . Progress to physical therapy goals is good. Pt has improved her ROM greatly over the past month and now only has an 8 deg quad lag with SLR. She is still using the cane out in the community, as her quad gets fatigued with longer distances.  She will benefit from continued skilled physical therapy to address remaining impairments and functional limitations.     Prognosis: good    Goals  Plan Goals: ST weeks  1.Patient will be independent with education for symptom management, joint protection and strategies to minimize stress on affected tissues  - part met  2. Pt to improve knee ROM to ext=4 deg and flex=105 deg - met  3. Patient will ambulate without assistive device community distances with near normal gait  - ongoing, not using cane at home  4. Pt to have a 10 deg or less quad lag with SLR  - met (8 deg lag)  5. Pt to complete KOS next visit   - met    LT weeks  1. Pt to improve knee ROM to ext=2 deg and flex=115 deg  - ongoing  2.  Patient will increase knee strength to 4 to 4+/5 to improve functional mobility  - ongoing  3. Pt to improve score on Knee Outcome Survey by 20% for  overall functional improvement  - ongoing  4. Patient will demonstrate an independent HEP for core and knee strength and flexibility/ROM - ongoing  5. Pt to have no quad lag with SLR  - ongoing             Recommendations: Continue as planned  Timeframe: 6 weeks  Prognosis to achieve goals: good    PT Signature: Sierra Ortega, PT      Based upon review of the patient's progress and continued therapy plan, it is my medical opinion that Hellen eDlgado should continue physical therapy treatment at [facility]  [address] [place] [address] [phone].    Signature: __________________________________  Stan Kinsey MD    Timed:  Manual Therapy:         mins  21144;  Therapeutic Exercise:    45     mins  18190;     Neuromuscular John:        mins  89091;    Therapeutic Activity:          mins  76326;     Gait Training:           mins  75008;     Ultrasound:          mins  14076;    Electrical Stimulation:        mins  21939 ( );  Iontophoresis         mins 90446;    Untimed:  Electrical Stimulation:         mins  91481 ( );  Mechanical Traction:         mins  91003;     Timed Treatment:   45   mins   Total Treatment:     45   mins

## 2019-09-23 ENCOUNTER — TREATMENT (OUTPATIENT)
Dept: PHYSICAL THERAPY | Facility: CLINIC | Age: 69
End: 2019-09-23

## 2019-09-23 DIAGNOSIS — R26.9 ABNORMAL GAIT: ICD-10-CM

## 2019-09-23 DIAGNOSIS — M25.562 ACUTE PAIN OF LEFT KNEE: Primary | ICD-10-CM

## 2019-09-23 DIAGNOSIS — Z96.652 STATUS POST TOTAL LEFT KNEE REPLACEMENT: ICD-10-CM

## 2019-09-23 PROCEDURE — 97110 THERAPEUTIC EXERCISES: CPT | Performed by: PHYSICAL THERAPIST

## 2019-09-23 NOTE — PROGRESS NOTES
Physical Therapy Daily Progress Note    Patient: Hellen Delgado   : 1950  Diagnosis/ICD-10 Code:  Acute pain of left knee [M25.562]  Referring practitioner: Stan Kinsey MD  Date of Initial Visit: Type: THERAPY  Noted: 2019  Today's Date: 2019  Patient seen for 11 sessions           Subjective     Objective       Active Range of Motion   Left Knee   Flexion: 110 degrees      See Exercise, Manual, and Modality Logs for complete treatment.       Assessment & Plan     Assessment  Assessment details: Pt continues to improve her ROM and strength. She was able to perform  SLR from the mat, not having to elevate from a pillow. Her gait without the SC still has a mild glute med shift, so recommend that she continues with her cane for now.                Timed:    Manual Therapy:         mins  92959;  Therapeutic Exercise:    45     mins  80470;     Neuromuscular John:        mins  80084;    Therapeutic Activity:          mins  01168;     Gait Training:           mins  24628;     Ultrasound:          mins  19347;    Electrical Stimulation:         mins  76836 ( );  Iontophoresis         mins 17637;  Aquatic Therapy         mins 37676;    Untimed:  Electrical Stimulation:         mins  18225 ( );  Mechanical Traction:         mins  18242;     Timed Treatment:   45   mins   Total Treatment:     45   mins  Sierra Ortega, PT  Physical Therapist

## 2019-09-26 ENCOUNTER — TREATMENT (OUTPATIENT)
Dept: PHYSICAL THERAPY | Facility: CLINIC | Age: 69
End: 2019-09-26

## 2019-09-26 DIAGNOSIS — M25.562 ACUTE PAIN OF LEFT KNEE: Primary | ICD-10-CM

## 2019-09-26 DIAGNOSIS — Z96.652 STATUS POST TOTAL LEFT KNEE REPLACEMENT: ICD-10-CM

## 2019-09-26 DIAGNOSIS — R26.9 ABNORMAL GAIT: ICD-10-CM

## 2019-09-26 PROCEDURE — 97110 THERAPEUTIC EXERCISES: CPT | Performed by: PHYSICAL THERAPIST

## 2019-09-26 PROCEDURE — 97530 THERAPEUTIC ACTIVITIES: CPT | Performed by: PHYSICAL THERAPIST

## 2019-09-26 NOTE — PROGRESS NOTES
Physical Therapy Daily Progress Note    Patient: Hellen Delgado   : 1950  Diagnosis/ICD-10 Code:  Acute pain of left knee [M25.562]  Referring practitioner: Stan Kinsey MD  Date of Initial Visit: Type: THERAPY  Noted: 2019  Today's Date: 2019  Patient seen for 12 sessions           Subjective Evaluation    History of Present Illness    Subjective comment: I was up on the leg a little more yesteday. It is a little sore and some more edema, 1.5/10 today       Objective   See Exercise, Manual, and Modality Logs for complete treatment.       Assessment & Plan     Assessment  Assessment details: Pt a little sore today after having an active day yesterday. Edema appears to remain low. She was able to perform stairs reciprocally with the handrail, some hesitancy with descending but no increased pain               Timed:    Manual Therapy:         mins  29665;  Therapeutic Exercise:    35     mins  87120;     Neuromuscular John:        mins  36930;    Therapeutic Activity:     8     mins  07903;     Gait Training:           mins  62347;     Ultrasound:          mins  48375;    Electrical Stimulation:         mins  68996 ( );  Iontophoresis         mins 28637;  Aquatic Therapy         mins 34612;    Untimed:  Electrical Stimulation:         mins  09575 ( );  Mechanical Traction:         mins  05750;     Timed Treatment:   43   mins   Total Treatment:     43   mins  Sierra Ortega, PT  Physical Therapist

## 2019-09-30 ENCOUNTER — TREATMENT (OUTPATIENT)
Dept: PHYSICAL THERAPY | Facility: CLINIC | Age: 69
End: 2019-09-30

## 2019-09-30 DIAGNOSIS — M25.562 ACUTE PAIN OF LEFT KNEE: Primary | ICD-10-CM

## 2019-09-30 DIAGNOSIS — Z96.652 STATUS POST TOTAL LEFT KNEE REPLACEMENT: ICD-10-CM

## 2019-09-30 DIAGNOSIS — R26.9 ABNORMAL GAIT: ICD-10-CM

## 2019-09-30 PROCEDURE — 97110 THERAPEUTIC EXERCISES: CPT | Performed by: PHYSICAL THERAPIST

## 2019-09-30 NOTE — PROGRESS NOTES
Physical Therapy Daily Progress Note    Patient: Hellen Delgado   : 1950  Diagnosis/ICD-10 Code:  Acute pain of left knee [M25.562]  Referring practitioner: Stan Kinsey MD  Date of Initial Visit: Type: THERAPY  Noted: 2019  Today's Date: 2019  Patient seen for 13 sessions           Subjective Evaluation    History of Present Illness    Subjective comment: I am just a little more sore today, the hamstrings and side of the thighPain  Current pain ratin           Objective   See Exercise, Manual, and Modality Logs for complete treatment.       Assessment & Plan     Assessment  Assessment details: Pt with some more sensitivity in her incision today and tenderness of the hamstrings. She would feel that more when trying to stretch into flexion today. Had to take a few more rest breaks with exercises. Also R hip pain with L SLR. Still really sore over the ITBand               Timed:    Manual Therapy:         mins  96310;  Therapeutic Exercise:    43     mins  36579;     Neuromuscular John:        mins  18862;    Therapeutic Activity:          mins  63963;     Gait Training:           mins  00499;     Ultrasound:          mins  43949;    Electrical Stimulation:         mins  76100 ( );  Iontophoresis         mins 67097;  Aquatic Therapy         mins 64463;    Untimed:  Electrical Stimulation:         mins  53024 ( );  Mechanical Traction:         mins  12913;     Timed Treatment:   43   mins   Total Treatment:     43   mins  Sierra Ortega, PT  Physical Therapist

## 2019-10-02 ENCOUNTER — TREATMENT (OUTPATIENT)
Dept: PHYSICAL THERAPY | Facility: CLINIC | Age: 69
End: 2019-10-02

## 2019-10-02 DIAGNOSIS — M25.562 ACUTE PAIN OF LEFT KNEE: Primary | ICD-10-CM

## 2019-10-02 DIAGNOSIS — R26.9 ABNORMAL GAIT: ICD-10-CM

## 2019-10-02 DIAGNOSIS — Z96.652 STATUS POST TOTAL LEFT KNEE REPLACEMENT: ICD-10-CM

## 2019-10-02 PROCEDURE — 97140 MANUAL THERAPY 1/> REGIONS: CPT | Performed by: PHYSICAL THERAPIST

## 2019-10-02 PROCEDURE — 97110 THERAPEUTIC EXERCISES: CPT | Performed by: PHYSICAL THERAPIST

## 2019-10-02 NOTE — PROGRESS NOTES
Physical Therapy Daily Progress Note    Patient: Hellen Delgado   : 1950  Diagnosis/ICD-10 Code:  Acute pain of left knee [M25.562]  Referring practitioner: Stan Kinsey MD  Date of Initial Visit: Type: THERAPY  Noted: 2019  Today's Date: 10/2/2019  Patient seen for 14 sessions           Subjective Evaluation    History of Present Illness    Subjective comment: the knee pain is mild, still real tender over the hamstring. R hip pain is moderate       Objective   See Exercise, Manual, and Modality Logs for complete treatment.       Assessment & Plan     Assessment  Assessment details: Pt still with quite a bit of soreness over the L biceps femoris muscle belly and tendon. Did some tissue release and it did markedly improve her pain at the end of the session. She is still having pain in the R hip over the GT.                Timed:    Manual Therapy:    12     mins  70146;  Therapeutic Exercise:    30     mins  49025;     Neuromuscular John:        mins  07292;    Therapeutic Activity:          mins  82347;     Gait Training:           mins  56285;     Ultrasound:          mins  67566;    Electrical Stimulation:         mins  57043 ( );  Iontophoresis         mins 39775;  Aquatic Therapy         mins 82885;    Untimed:  Electrical Stimulation:         mins  37076 ( );  Mechanical Traction:         mins  09560;     Timed Treatment:   42   mins   Total Treatment:     42   mins  Sierra Ortega, PT  Physical Therapist

## 2019-10-07 ENCOUNTER — TREATMENT (OUTPATIENT)
Dept: PHYSICAL THERAPY | Facility: CLINIC | Age: 69
End: 2019-10-07

## 2019-10-07 DIAGNOSIS — R26.9 ABNORMAL GAIT: ICD-10-CM

## 2019-10-07 DIAGNOSIS — M25.562 ACUTE PAIN OF LEFT KNEE: Primary | ICD-10-CM

## 2019-10-07 DIAGNOSIS — Z96.652 STATUS POST TOTAL LEFT KNEE REPLACEMENT: ICD-10-CM

## 2019-10-07 PROCEDURE — 97140 MANUAL THERAPY 1/> REGIONS: CPT | Performed by: PHYSICAL THERAPIST

## 2019-10-07 PROCEDURE — 97110 THERAPEUTIC EXERCISES: CPT | Performed by: PHYSICAL THERAPIST

## 2019-10-07 NOTE — PROGRESS NOTES
Physical Therapy Daily Progress Note    Patient: Hellen Delgado   : 1950  Diagnosis/ICD-10 Code:  Acute pain of left knee [M25.562]  Referring practitioner: Stan Kinsey MD  Date of Initial Visit: Type: THERAPY  Noted: 2019  Today's Date: 10/7/2019  Patient seen for 15 sessions           Subjective Evaluation    History of Present Illness    Subjective comment: the R hip is still really hurting. Going to see if I can get a shot in it tomorrow       Objective       Active Range of Motion   Left Knee   Flexion: 112 degrees   Extension: 3 degrees      See Exercise, Manual, and Modality Logs for complete treatment.       Assessment & Plan     Assessment  Assessment details: Hellen Delgado has been seen for 15 physical therapy sessions for s/p L TKA.  Treatment has included therapeutic exercise, manual therapy, gait training and patient education with home exercise program . Progress to physical therapy goals is good. She continues to improve her ROM and strength. She is no longer using an AD for gait and has very minimal deficits.  She is still having difficulty on the stairs, but her biggest complaint of pain is the R hip GT bursa. She has been having pain there for several weeks, stretching has not been improving it.   She will benefit from continued skilled physical therapy to address remaining impairments and functional limitations.                  Timed:    Manual Therapy:    12     mins  20176;  Therapeutic Exercise:    30     mins  71450;     Neuromuscular John:        mins  18971;    Therapeutic Activity:          mins  79446;     Gait Training:           mins  55515;     Ultrasound:          mins  41974;    Electrical Stimulation:         mins  40434 ( );  Iontophoresis         mins 46797;  Aquatic Therapy         mins 53560;    Untimed:  Electrical Stimulation:         mins  03434 ( );  Mechanical Traction:         mins  46891;     Timed Treatment:   42   mins   Total  Treatment:     42   mins  Sierra Ortega, PT  Physical Therapist

## 2019-10-08 ENCOUNTER — OFFICE VISIT (OUTPATIENT)
Dept: ORTHOPEDIC SURGERY | Facility: CLINIC | Age: 69
End: 2019-10-08

## 2019-10-08 VITALS — TEMPERATURE: 98.4 F | BODY MASS INDEX: 33.05 KG/M2 | WEIGHT: 198.4 LBS | HEIGHT: 65 IN

## 2019-10-08 DIAGNOSIS — Z96.652 STATUS POST LEFT KNEE REPLACEMENT: Primary | ICD-10-CM

## 2019-10-08 PROCEDURE — 73562 X-RAY EXAM OF KNEE 3: CPT | Performed by: NURSE PRACTITIONER

## 2019-10-08 PROCEDURE — 99024 POSTOP FOLLOW-UP VISIT: CPT | Performed by: NURSE PRACTITIONER

## 2019-10-08 NOTE — PROGRESS NOTES
Hellen Delgado : 1950 MRN: [id number] DATE: 10/8/2019    DIAGNOSIS: 8 week follow up left total knee      SUBJECTIVE:Patient returns today for 8 week follow up of left total knee replacement. Patient reports doing well with no unusual complaints. Appears to be progressing appropriately.    OBJECTIVE:   Exam:. The incision is well healed. No sign of infection. Range of motion is measured at 0 to 120. The calf is soft and nontender with a negative Homans sign. Strength is progressing and the patient is ambulating appropriately.    DIAGNOSTIC STUDIES  Xrays: 3 views of the left knee (AP, lateral, and sunrise) were ordered and reviewed for evaluation of recent knee replacement. They demonstrate a well positioned, well aligned knee replacement without complicating factors noted. In comparison with previous films there has been no change.    ASSESSMENT: 8 week status post left knee replacement.    PLAN: 1) Continue with PT exercises as prescribed   2) Follow up in 10 months for her knee and 4 weeks for right hip bursa injection if needed    Erica Marmolejo, APRN  10/8/2019

## 2019-10-10 ENCOUNTER — TREATMENT (OUTPATIENT)
Dept: PHYSICAL THERAPY | Facility: CLINIC | Age: 69
End: 2019-10-10

## 2019-10-10 DIAGNOSIS — Z96.652 STATUS POST TOTAL LEFT KNEE REPLACEMENT: ICD-10-CM

## 2019-10-10 DIAGNOSIS — R26.9 ABNORMAL GAIT: ICD-10-CM

## 2019-10-10 DIAGNOSIS — M25.562 ACUTE PAIN OF LEFT KNEE: Primary | ICD-10-CM

## 2019-10-10 PROCEDURE — 97110 THERAPEUTIC EXERCISES: CPT | Performed by: PHYSICAL THERAPIST

## 2019-10-10 PROCEDURE — 97530 THERAPEUTIC ACTIVITIES: CPT | Performed by: PHYSICAL THERAPIST

## 2019-10-10 NOTE — PROGRESS NOTES
Physical Therapy Daily Progress Note    Patient: Hellen Delgado   : 1950  Diagnosis/ICD-10 Code:  Acute pain of left knee [M25.562]  Referring practitioner: Stan Kinsey MD  Date of Initial Visit: Type: THERAPY  Noted: 2019  Today's Date: 10/10/2019  Patient seen for 16 sessions           Subjective Evaluation    History of Present Illness    Subjective comment: had MD appt this week. please with motion of knee. Wanted to wait for injection in the hip for infection reasons       Objective   See Exercise, Manual, and Modality Logs for complete treatment.       Assessment & Plan     Assessment  Assessment details: Worked on sit to stand from mat height, mild use of UEs to rise up. Also able to increase her step downs to 6 inches today with use of a hand hold. Pt has been continuing with her stretching of the R hip and instructed to try Biofreeze and also ice a couple of times a day               Timed:    Manual Therapy:         mins  53168;  Therapeutic Exercise:    35     mins  43672;     Neuromuscular John:        mins  46640;    Therapeutic Activity:     8     mins  95580;     Gait Training:           mins  70995;     Ultrasound:          mins  10267;    Electrical Stimulation:         mins  80789 ( );  Iontophoresis         mins 44706;  Aquatic Therapy         mins 51192;    Untimed:  Electrical Stimulation:         mins  58260 ( );  Mechanical Traction:         mins  69061;     Timed Treatment:   43   mins   Total Treatment:     43   mins  Sierra Ortega, PT  Physical Therapist

## 2019-10-14 ENCOUNTER — TREATMENT (OUTPATIENT)
Dept: PHYSICAL THERAPY | Facility: CLINIC | Age: 69
End: 2019-10-14

## 2019-10-14 DIAGNOSIS — R26.9 ABNORMAL GAIT: ICD-10-CM

## 2019-10-14 DIAGNOSIS — M25.562 ACUTE PAIN OF LEFT KNEE: Primary | ICD-10-CM

## 2019-10-14 DIAGNOSIS — Z96.652 STATUS POST TOTAL LEFT KNEE REPLACEMENT: ICD-10-CM

## 2019-10-14 PROCEDURE — 97110 THERAPEUTIC EXERCISES: CPT | Performed by: PHYSICAL THERAPIST

## 2019-10-14 PROCEDURE — 97140 MANUAL THERAPY 1/> REGIONS: CPT | Performed by: PHYSICAL THERAPIST

## 2019-10-14 NOTE — PROGRESS NOTES
Physical Therapy Daily Progress Note    Patient: Hellen Delgado   : 1950  Diagnosis/ICD-10 Code:  Acute pain of left knee [M25.562]  Referring practitioner: Stan Kinsey MD  Date of Initial Visit: Type: THERAPY  Noted: 2019  Today's Date: 10/14/2019  Patient seen for 17 sessions           Subjective Evaluation    History of Present Illness    Subjective comment: Still with the pain in the hamstring and the R hip. HS is some better, but still catches       Objective   See Exercise, Manual, and Modality Logs for complete treatment.       Assessment & Plan     Assessment  Assessment details: Pt is still having pain in the L lateral HS. She does have increased tightness and tenderness that is improved with manual therapy. He  did work on it some over the weekend.                Timed:    Manual Therapy:    15     mins  32267;  Therapeutic Exercise:    30     mins  69117;     Neuromuscular John:       mins  15735;    Therapeutic Activity:          mins  26613;     Gait Training:           mins  31523;     Ultrasound:          mins  98160;    Electrical Stimulation:         mins  49511 ( );  Iontophoresis         mins 92703;  Aquatic Therapy         mins 46028;    Untimed:  Electrical Stimulation:         mins  58560 ( );  Mechanical Traction:         mins  69881;     Timed Treatment:   45   mins   Total Treatment:     45   mins  Sierra Ortega PT  Physical Therapist

## 2019-10-16 ENCOUNTER — TREATMENT (OUTPATIENT)
Dept: PHYSICAL THERAPY | Facility: CLINIC | Age: 69
End: 2019-10-16

## 2019-10-16 DIAGNOSIS — M25.562 ACUTE PAIN OF LEFT KNEE: Primary | ICD-10-CM

## 2019-10-16 DIAGNOSIS — R26.9 ABNORMAL GAIT: ICD-10-CM

## 2019-10-16 DIAGNOSIS — Z96.652 STATUS POST TOTAL LEFT KNEE REPLACEMENT: ICD-10-CM

## 2019-10-16 PROCEDURE — 97140 MANUAL THERAPY 1/> REGIONS: CPT | Performed by: PHYSICAL THERAPIST

## 2019-10-16 PROCEDURE — 97110 THERAPEUTIC EXERCISES: CPT | Performed by: PHYSICAL THERAPIST

## 2019-10-16 NOTE — PROGRESS NOTES
Physical Therapy Daily Progress Note    Patient: Hellen Delgado   : 1950  Diagnosis/ICD-10 Code:  Acute pain of left knee [M25.562]  Referring practitioner: Stan Kinsey MD  Date of Initial Visit: Type: THERAPY  Noted: 2019  Today's Date: 10/16/2019  Patient seen for 18 sessions           Subjective     Objective       Active Range of Motion   Left Knee   Flexion: 116 degrees   Extension: 2 degrees     Additional Active Range of Motion Details  No quad lag with SLR     See Exercise, Manual, and Modality Logs for complete treatment.       Assessment & Plan     Assessment  Assessment details: Pt continues to improve her ROM and strength. She now has no quad lag with her SLR. Her pain in the distal hamstring is improving, but is , tight and sore.                Timed:    Manual Therapy:    8     mins  57079;  Therapeutic Exercise:    38     mins  39243;     Neuromuscular John:        mins  89380;    Therapeutic Activity:          mins  18066;     Gait Training:           mins  04202;     Ultrasound:          mins  77299;    Electrical Stimulation:         mins  88083 ( );  Iontophoresis         mins 80589;  Aquatic Therapy         mins 00426;    Untimed:  Electrical Stimulation:         mins  29660 ( );  Mechanical Traction:         mins  05852;     Timed Treatment:   46   mins   Total Treatment:     46   mins  Sierra Ortega, PT  Physical Therapist

## 2019-10-21 ENCOUNTER — TREATMENT (OUTPATIENT)
Dept: PHYSICAL THERAPY | Facility: CLINIC | Age: 69
End: 2019-10-21

## 2019-10-21 DIAGNOSIS — Z96.652 STATUS POST TOTAL LEFT KNEE REPLACEMENT: ICD-10-CM

## 2019-10-21 DIAGNOSIS — M25.562 ACUTE PAIN OF LEFT KNEE: Primary | ICD-10-CM

## 2019-10-21 DIAGNOSIS — R26.9 ABNORMAL GAIT: ICD-10-CM

## 2019-10-21 PROCEDURE — 97110 THERAPEUTIC EXERCISES: CPT | Performed by: PHYSICAL THERAPIST

## 2019-10-21 PROCEDURE — 97140 MANUAL THERAPY 1/> REGIONS: CPT | Performed by: PHYSICAL THERAPIST

## 2019-10-21 NOTE — PROGRESS NOTES
Re-Assessment / Re-Certification        Patient: Hellen Delgado   : 1950  Diagnosis/ICD-10 Code:  Acute pain of left knee [M25.562]  Referring practitioner: Stan Kinsey MD  Date of Initial Visit: Type: THERAPY  Noted: 2019  Today's Date: 10/21/2019  Patient seen for 19 sessions      Subjective:     Clinical Progress: improved  Home Program Compliance: Yes  Treatment has included:  therapeutic exercise, manual therapy, therapeutic activity, neuro-muscular retraining , gait training and patient education with home exercise program     Subjective   Objective       Active Range of Motion   Left Knee   Flexion: 115 degrees   Extension: 2 degrees      See Exercise, Manual, and Modality Logs for complete treatment.       Assessment & Plan     Assessment  Assessment details: Hellen Delgado has been seen for 19 physical therapy sessions for s/p L TKA.  Treatment has included therapeutic exercise, manual therapy, therapeutic activity, neuro-muscular retraining , gait training and patient education with home exercise program . Progress to physical therapy goals is good.  Her ROM has improved to 2-115 deg without a quad lag with SLR. She still is having pain in the hamstring (and R hip) and difficulty with going down stairs reciprocally. She will benefit from continued skilled physical therapy to address remaining impairments and functional limitations.     Prognosis: good    Goals  Plan Goals: ST weeks  1.Patient will be independent with education for symptom management, joint protection and strategies to minimize stress on affected tissues  - part met  2. Pt to improve knee ROM to ext=4 deg and flex=105 deg - met  3. Patient will ambulate without assistive device community distances with near normal gait  - MET  4. Pt to have a 10 deg or less quad lag with SLR  - met   5. Pt to complete KOS next visit   - met    LT weeks  1. Pt to improve knee ROM to ext=2 deg and flex=115 deg  - ongoing  2.   Patient will increase knee strength to 4 to 4+/5 to improve functional mobility  - ongoing  3. Pt to improve score on Knee Outcome Survey by 20% for overall functional improvement  - ongoing - improved from 38.7% to 57.5%  4. Patient will demonstrate an independent HEP for core and knee strength and flexibility/ROM - ongoing  5. Pt to have no quad lag with SLR  - MET    Plan  Therapy options: will be seen for skilled physical therapy services           Recommendations: Continue as planned  Timeframe: 1 month  Prognosis to achieve goals: good    PT Signature: Sierra Ortega, PT      Based upon review of the patient's progress and continued therapy plan, it is my medical opinion that Hellen Delgado should continue physical therapy treatment at [facility]  [address] [place] [address] [phone].    Signature: __________________________________  Stan Kinsey MD    Timed:  Manual Therapy:    8     mins  95329;  Therapeutic Exercise:    35     mins  30794;     Neuromuscular John:        mins  17393;    Therapeutic Activity:          mins  19138;     Gait Training:           mins  13217;     Ultrasound:          mins  85179;    Electrical Stimulation:         mins  89799 ( );  Iontophoresis         mins 07330;    Untimed:  Electrical Stimulation:         mins  70720 ( );  Mechanical Traction:         mins  29093;     Timed Treatment:   43   mins   Total Treatment:     43   mins

## 2019-10-23 ENCOUNTER — TREATMENT (OUTPATIENT)
Dept: PHYSICAL THERAPY | Facility: CLINIC | Age: 69
End: 2019-10-23

## 2019-10-23 DIAGNOSIS — M25.562 ACUTE PAIN OF LEFT KNEE: Primary | ICD-10-CM

## 2019-10-23 DIAGNOSIS — Z96.652 STATUS POST TOTAL LEFT KNEE REPLACEMENT: ICD-10-CM

## 2019-10-23 DIAGNOSIS — R26.9 ABNORMAL GAIT: ICD-10-CM

## 2019-10-23 PROCEDURE — 97140 MANUAL THERAPY 1/> REGIONS: CPT | Performed by: PHYSICAL THERAPIST

## 2019-10-23 PROCEDURE — 97110 THERAPEUTIC EXERCISES: CPT | Performed by: PHYSICAL THERAPIST

## 2019-10-23 NOTE — PROGRESS NOTES
Physical Therapy Daily Progress Note    Patient: Hellen Delgado   : 1950  Diagnosis/ICD-10 Code:  Acute pain of left knee [M25.562]  Referring practitioner: Stan Kinsey MD  Date of Initial Visit: Type: THERAPY  Noted: 2019  Today's Date: 10/23/2019  Patient seen for 20 sessions           Subjective     Objective       Active Range of Motion   Left Knee   Flexion: 115 degrees      See Exercise, Manual, and Modality Logs for complete treatment.       Assessment & Plan     Assessment  Assessment details: Pt still with pain in the hamstrings with tenderness. She also still has pain in the R hip, supposed to get an injection next week . She has been able to perform stairs reciprocally more at home, ascending. ROM good - 2-115 deg.                Timed:    Manual Therapy:    12     mins  07031;  Therapeutic Exercise:    33     mins  65137;     Neuromuscular John:        mins  13611;    Therapeutic Activity:          mins  47316;     Gait Training:           mins  78496;     Ultrasound:          mins  69634;    Electrical Stimulation:         mins  65179 ( );  Iontophoresis         mins 33545;  Aquatic Therapy         mins 38525;    Untimed:  Electrical Stimulation:         mins  64367 ( );  Mechanical Traction:         mins  71849;     Timed Treatment:   45   mins   Total Treatment:     45   mins  Sierra Ortega, PT  Physical Therapist

## 2019-10-29 ENCOUNTER — CLINICAL SUPPORT (OUTPATIENT)
Dept: ORTHOPEDIC SURGERY | Facility: CLINIC | Age: 69
End: 2019-10-29

## 2019-10-29 VITALS — TEMPERATURE: 96.8 F | HEIGHT: 65 IN | BODY MASS INDEX: 32.52 KG/M2 | WEIGHT: 195.2 LBS

## 2019-10-29 DIAGNOSIS — M70.61 TROCHANTERIC BURSITIS OF RIGHT HIP: Primary | ICD-10-CM

## 2019-10-29 PROCEDURE — 20610 DRAIN/INJ JOINT/BURSA W/O US: CPT | Performed by: NURSE PRACTITIONER

## 2019-10-29 RX ORDER — LEVOTHYROXINE SODIUM 125 MCG
TABLET ORAL
Refills: 1 | COMMUNITY
Start: 2019-10-08

## 2019-10-29 RX ORDER — BUPROPION HYDROCHLORIDE 150 MG/1
150 TABLET, EXTENDED RELEASE ORAL 2 TIMES DAILY
Refills: 3 | COMMUNITY
Start: 2019-10-07

## 2019-10-29 RX ORDER — AMOXICILLIN 500 MG/1
CAPSULE ORAL
Refills: 3 | COMMUNITY
Start: 2019-10-07 | End: 2021-08-17

## 2019-10-29 RX ORDER — METHYLPREDNISOLONE ACETATE 80 MG/ML
80 INJECTION, SUSPENSION INTRA-ARTICULAR; INTRALESIONAL; INTRAMUSCULAR; SOFT TISSUE
Status: COMPLETED | OUTPATIENT
Start: 2019-10-29 | End: 2019-10-29

## 2019-10-29 RX ADMIN — METHYLPREDNISOLONE ACETATE 80 MG: 80 INJECTION, SUSPENSION INTRA-ARTICULAR; INTRALESIONAL; INTRAMUSCULAR; SOFT TISSUE at 07:52

## 2019-10-29 NOTE — PROGRESS NOTES
10/29/2019    Hellen Delgado is here today for lateral hip pain. Pt has undergone injection of the hip bursa in the past with good resolution of symptoms. Pt is requesting a repeat injection.     Hip Bursa Injection Procedure Note:    Large Joint Arthrocentesis: R greater trochanteric bursa  Date/Time: 10/29/2019 7:52 AM  Consent given by: patient  Site marked: site marked  Timeout: Immediately prior to procedure a time out was called to verify the correct patient, procedure, equipment, support staff and site/side marked as required   Supporting Documentation  Indications: pain and joint swelling   Procedure Details  Location: hip - R greater trochanteric bursa  Preparation: Patient was prepped and draped in the usual sterile fashion  Needle size: 22 G  Approach: anterolateral  Medications administered: 4 mL lidocaine (cardiac); 80 mg methylPREDNISolone acetate 80 MG/ML  Patient tolerance: patient tolerated the procedure well with no immediate complications          At the conclusion of the injection I discussed the importance of continued fascial stretching on a daily basis. I will see the patient on a PRN basis or if the symptoms should fail to improve or worsen.    Erica Marmolejo, APRN    10/29/2019

## 2019-10-30 ENCOUNTER — TREATMENT (OUTPATIENT)
Dept: PHYSICAL THERAPY | Facility: CLINIC | Age: 69
End: 2019-10-30

## 2019-10-30 DIAGNOSIS — Z96.652 STATUS POST TOTAL LEFT KNEE REPLACEMENT: ICD-10-CM

## 2019-10-30 DIAGNOSIS — M25.562 ACUTE PAIN OF LEFT KNEE: Primary | ICD-10-CM

## 2019-10-30 DIAGNOSIS — R26.9 ABNORMAL GAIT: ICD-10-CM

## 2019-10-30 PROCEDURE — 97110 THERAPEUTIC EXERCISES: CPT | Performed by: PHYSICAL THERAPIST

## 2019-10-30 NOTE — PROGRESS NOTES
Physical Therapy Daily Progress Note    Patient: Hellen Delgado   : 1950  Diagnosis/ICD-10 Code:  Acute pain of left knee [M25.562]  Referring practitioner: Stan Kinsey MD  Date of Initial Visit: Type: THERAPY  Noted: 2019  Today's Date: 10/30/2019  Patient seen for 21 sessions           Subjective Evaluation    History of Present Illness    Subjective comment: got an injection in the hip 2 days ago. Starting to feel better.        Objective   See Exercise, Manual, and Modality Logs for complete treatment.       Assessment & Plan     Assessment  Assessment details: Pt got an injection in the R hip 2 days ago for bursitis. She feels like she is starting to get a little relief today. She is still stretching and icing it as well. She had a little more stiffness with her flexion exercises, given stair stretch for home               Timed:    Manual Therapy:         mins  04741;  Therapeutic Exercise:    43     mins  75306;     Neuromuscular John:        mins  67100;    Therapeutic Activity:          mins  22160;     Gait Training:           mins  64009;     Ultrasound:          mins  22668;    Electrical Stimulation:         mins  23522 ( );  Iontophoresis         mins 83571;  Aquatic Therapy         mins 05291;    Untimed:  Electrical Stimulation:         mins  38543 ( );  Mechanical Traction:         mins  24125;     Timed Treatment:   43   mins   Total Treatment:     43   mins  Sierra Ortega, PT  Physical Therapist

## 2019-11-01 ENCOUNTER — TREATMENT (OUTPATIENT)
Dept: PHYSICAL THERAPY | Facility: CLINIC | Age: 69
End: 2019-11-01

## 2019-11-01 DIAGNOSIS — R26.9 ABNORMAL GAIT: ICD-10-CM

## 2019-11-01 DIAGNOSIS — Z96.652 STATUS POST TOTAL LEFT KNEE REPLACEMENT: ICD-10-CM

## 2019-11-01 DIAGNOSIS — M25.562 ACUTE PAIN OF LEFT KNEE: Primary | ICD-10-CM

## 2019-11-01 PROCEDURE — 97110 THERAPEUTIC EXERCISES: CPT | Performed by: PHYSICAL THERAPIST

## 2019-11-01 NOTE — PROGRESS NOTES
Physical Therapy Daily Progress Note    Patient: Hellen Delgado   : 1950  Diagnosis/ICD-10 Code:  Acute pain of left knee [M25.562]  Referring practitioner: Stan Kinsey MD  Date of Initial Visit: Type: THERAPY  Noted: 2019  Today's Date: 2019  Patient seen for 22 sessions           Subjective     Objective       Active Range of Motion   Left Knee   Flexion: 115 degrees   Extension: 0 degrees      See Exercise, Manual, and Modality Logs for complete treatment.       Assessment & Plan     Assessment  Assessment details: Pt reports that her R hip injection is improving her pain every day. She is still having some pain in the L distal HS and proximal calf when trying to bend the knee. ROM is 0-115 deg                 Timed:    Manual Therapy:         mins  18494;  Therapeutic Exercise:    45     mins  45029;     Neuromuscular John:        mins  15792;    Therapeutic Activity:          mins  64392;     Gait Training:           mins  85292;     Ultrasound:          mins  06688;    Electrical Stimulation:         mins  93479 ( );  Iontophoresis         mins 00800;  Aquatic Therapy         mins 34490;    Untimed:  Electrical Stimulation:         mins  62534 (MC );  Mechanical Traction:         mins  14109;     Timed Treatment:   45   mins   Total Treatment:     45   mins  Sierra Ortega, PT  Physical Therapist

## 2019-11-04 ENCOUNTER — TREATMENT (OUTPATIENT)
Dept: PHYSICAL THERAPY | Facility: CLINIC | Age: 69
End: 2019-11-04

## 2019-11-04 DIAGNOSIS — M25.562 ACUTE PAIN OF LEFT KNEE: Primary | ICD-10-CM

## 2019-11-04 DIAGNOSIS — Z96.652 STATUS POST TOTAL LEFT KNEE REPLACEMENT: ICD-10-CM

## 2019-11-04 DIAGNOSIS — R26.9 ABNORMAL GAIT: ICD-10-CM

## 2019-11-04 PROCEDURE — 97110 THERAPEUTIC EXERCISES: CPT | Performed by: PHYSICAL THERAPIST

## 2019-11-04 PROCEDURE — 97140 MANUAL THERAPY 1/> REGIONS: CPT | Performed by: PHYSICAL THERAPIST

## 2019-11-04 NOTE — PROGRESS NOTES
Physical Therapy Daily Progress Note    Patient: Hellen Delgado   : 1950  Diagnosis/ICD-10 Code:  Acute pain of left knee [M25.562]  Referring practitioner: Stan Kinsey MD  Date of Initial Visit: Type: THERAPY  Noted: 2019  Today's Date: 2019  Patient seen for 23 sessions           Subjective Evaluation    History of Present Illness    Subjective comment: still pretty sore in the top of the calf, hip and hamstring are better       Objective   See Exercise, Manual, and Modality Logs for complete treatment.       Assessment & Plan     Assessment  Assessment details: Pt still with a slight glute med shift with gait. Her R hip has almost gotten back to normal. She still has pain and tenderness of the R distal HS and lateral head of gastroc that was reduced with manual therapy.                Timed:    Manual Therapy:    10     mins  65201;  Therapeutic Exercise:    30     mins  66637;     Neuromuscular John:        mins  55022;    Therapeutic Activity:          mins  75319;     Gait Training:           mins  42806;     Ultrasound:          mins  95470;    Electrical Stimulation:         mins  28244 ( );  Iontophoresis         mins 90835;  Aquatic Therapy         mins 91030;    Untimed:  Electrical Stimulation:         mins  91621 ( );  Mechanical Traction:         mins  25791;     Timed Treatment:   40   mins   Total Treatment:     40   mins  Sierra Ortega PT  Physical Therapist

## 2019-11-06 ENCOUNTER — TREATMENT (OUTPATIENT)
Dept: PHYSICAL THERAPY | Facility: CLINIC | Age: 69
End: 2019-11-06

## 2019-11-06 DIAGNOSIS — R26.9 ABNORMAL GAIT: ICD-10-CM

## 2019-11-06 DIAGNOSIS — Z96.652 STATUS POST TOTAL LEFT KNEE REPLACEMENT: ICD-10-CM

## 2019-11-06 DIAGNOSIS — M25.562 ACUTE PAIN OF LEFT KNEE: Primary | ICD-10-CM

## 2019-11-06 PROCEDURE — 97110 THERAPEUTIC EXERCISES: CPT | Performed by: PHYSICAL THERAPIST

## 2019-11-06 NOTE — PROGRESS NOTES
Physical Therapy Daily Progress Note    Patient: Hellen Delgado   : 1950  Diagnosis/ICD-10 Code:  Acute pain of left knee [M25.562]  Referring practitioner: Stan Kinsey MD  Date of Initial Visit: Type: THERAPY  Noted: 2019  Today's Date: 2019  Patient seen for 24 sessions           Subjective Evaluation    History of Present Illness    Subjective comment: better, but still sore in the calf and HS       Objective   See Exercise, Manual, and Modality Logs for complete treatment.       Assessment & Plan     Assessment  Assessment details: Pt still having tightness and pain the L proximal calf and distal HS. Added some new stretching for home as well as prone knee flexion with strap. Pt did feel better at the end of the session today               Timed:    Manual Therapy:         mins  22821;  Therapeutic Exercise:    45     mins  31724;     Neuromuscular John:        mins  75526;    Therapeutic Activity:          mins  53217;     Gait Training:           mins  93636;     Ultrasound:          mins  45381;    Electrical Stimulation:         mins  22999 ( );  Iontophoresis         mins 20339;  Aquatic Therapy         mins 04598;    Untimed:  Electrical Stimulation:         mins  87720 ( );  Mechanical Traction:         mins  58952;     Timed Treatment:   45   mins   Total Treatment:     45   mins  Sierra Ortega, PT  Physical Therapist

## 2019-11-11 ENCOUNTER — TREATMENT (OUTPATIENT)
Dept: PHYSICAL THERAPY | Facility: CLINIC | Age: 69
End: 2019-11-11

## 2019-11-11 DIAGNOSIS — R26.9 ABNORMAL GAIT: ICD-10-CM

## 2019-11-11 DIAGNOSIS — Z96.652 STATUS POST TOTAL LEFT KNEE REPLACEMENT: ICD-10-CM

## 2019-11-11 DIAGNOSIS — M25.562 ACUTE PAIN OF LEFT KNEE: Primary | ICD-10-CM

## 2019-11-11 PROCEDURE — 97530 THERAPEUTIC ACTIVITIES: CPT | Performed by: PHYSICAL THERAPIST

## 2019-11-11 PROCEDURE — 97110 THERAPEUTIC EXERCISES: CPT | Performed by: PHYSICAL THERAPIST

## 2019-11-11 NOTE — PROGRESS NOTES
Physical Therapy Daily Progress Note    Patient: Hellen Delgado   : 1950  Diagnosis/ICD-10 Code:  Acute pain of left knee [M25.562]  Referring practitioner: Stan Kinsey MD  Date of Initial Visit: Type: THERAPY  Noted: 2019  Today's Date: 2019  Patient seen for 25 sessions           Subjective Evaluation    History of Present Illness    Subjective comment: the calf stretches have been helping, still with the hamstring pain       Objective   See Exercise, Manual, and Modality Logs for complete treatment.       Assessment & Plan     Assessment  Assessment details: Pt is having decreased calf pain since implementing new stretching. She is still having pain and tightness at the distal hamstring. She is able to go up stairs with a reciprocal pattern, but struggles to descend on more than a 6 in step.                Timed:    Manual Therapy:         mins  92211;  Therapeutic Exercise:    35     mins  07201;     Neuromuscular John:        mins  82777;    Therapeutic Activity:     8     mins  56359;     Gait Training:           mins  90870;     Ultrasound:          mins  80651;    Electrical Stimulation:         mins  49181 ( );  Iontophoresis         mins 33848;  Aquatic Therapy         mins 48383;    Untimed:  Electrical Stimulation:         mins  01407 ( );  Mechanical Traction:         mins  74705;     Timed Treatment:   43   mins   Total Treatment:     43   mins  Sierra Ortega PT  Physical Therapist

## 2019-11-13 ENCOUNTER — TREATMENT (OUTPATIENT)
Dept: PHYSICAL THERAPY | Facility: CLINIC | Age: 69
End: 2019-11-13

## 2019-11-13 DIAGNOSIS — Z96.652 STATUS POST TOTAL LEFT KNEE REPLACEMENT: ICD-10-CM

## 2019-11-13 DIAGNOSIS — M25.562 ACUTE PAIN OF LEFT KNEE: Primary | ICD-10-CM

## 2019-11-13 DIAGNOSIS — R26.9 ABNORMAL GAIT: ICD-10-CM

## 2019-11-13 PROCEDURE — 97110 THERAPEUTIC EXERCISES: CPT | Performed by: PHYSICAL THERAPIST

## 2019-11-13 NOTE — PROGRESS NOTES
Physical Therapy Daily Progress Note    Patient: Hellen Delgado   : 1950  Diagnosis/ICD-10 Code:  Acute pain of left knee [M25.562]  Referring practitioner: Stan Kinsey MD  Date of Initial Visit: Type: THERAPY  Noted: 2019  Today's Date: 2019  Patient seen for 26 sessions           Subjective Evaluation    History of Present Illness    Subjective comment: my dog was running through the house and ran straight into my knee yesterday. Its a little sore.        Objective   See Exercise, Manual, and Modality Logs for complete treatment.       Assessment & Plan     Assessment  Assessment details: Pt with some soreness over the medial joint line from her dog running head first into her knee yesterday. She didn't fall, it is just sore to the touch. She continues with calf tightness and some pain.                Timed:    Manual Therapy:         mins  32154;  Therapeutic Exercise:    45     mins  20130;     Neuromuscular John:        mins  31128;    Therapeutic Activity:          mins  47969;     Gait Training:           mins  13926;     Ultrasound:          mins  47846;    Electrical Stimulation:         mins  99911 ( );  Iontophoresis         mins 36779;  Aquatic Therapy         mins 22334;    Untimed:  Electrical Stimulation:         mins  81007 ( );  Mechanical Traction:         mins  98359;     Timed Treatment:   45   mins   Total Treatment:     45   mins  Sierra Ortega, PT  Physical Therapist

## 2019-11-20 ENCOUNTER — TREATMENT (OUTPATIENT)
Dept: PHYSICAL THERAPY | Facility: CLINIC | Age: 69
End: 2019-11-20

## 2019-11-20 DIAGNOSIS — Z96.652 STATUS POST TOTAL LEFT KNEE REPLACEMENT: ICD-10-CM

## 2019-11-20 DIAGNOSIS — M25.562 ACUTE PAIN OF LEFT KNEE: Primary | ICD-10-CM

## 2019-11-20 PROCEDURE — 97110 THERAPEUTIC EXERCISES: CPT | Performed by: PHYSICAL THERAPIST

## 2019-11-20 NOTE — PROGRESS NOTES
Physical Therapy Daily Progress Note    Patient: Hellen Delgado   : 1950  Diagnosis/ICD-10 Code:  Acute pain of left knee [M25.562]  Referring practitioner: Stan Kinsey MD  Date of Initial Visit: Type: THERAPY  Noted: 2019  Today's Date: 2019  Patient seen for 27 sessions           Subjective Evaluation    History of Present Illness    Subjective comment: still having a hard time going down the stairs       Objective   See Exercise, Manual, and Modality Logs for complete treatment.       Assessment & Plan     Assessment  Assessment details: Pt still having some hamstring tightness and pain with flexion of the knee. She is having a harder time descending stairs, some pain at normal height, very little in a 6 in step. Given chair to chair stretch for home, 5 min daily               Timed:    Manual Therapy:         mins  52353;  Therapeutic Exercise:    45     mins  65634;     Neuromuscular John:        mins  88595;    Therapeutic Activity:          mins  08958;     Gait Training:           mins  58817;     Ultrasound:          mins  31613;    Electrical Stimulation:         mins  66563 ( );  Iontophoresis         mins 76137;  Aquatic Therapy         mins 97609;    Untimed:  Electrical Stimulation:         mins  17683 ( );  Mechanical Traction:         mins  52455;     Timed Treatment:   45   mins   Total Treatment:     45   mins  Sierra Ortega, PT  Physical Therapist

## 2019-11-22 ENCOUNTER — TREATMENT (OUTPATIENT)
Dept: PHYSICAL THERAPY | Facility: CLINIC | Age: 69
End: 2019-11-22

## 2019-11-22 DIAGNOSIS — R26.9 ABNORMAL GAIT: ICD-10-CM

## 2019-11-22 DIAGNOSIS — M25.562 ACUTE PAIN OF LEFT KNEE: Primary | ICD-10-CM

## 2019-11-22 DIAGNOSIS — Z96.652 STATUS POST TOTAL LEFT KNEE REPLACEMENT: ICD-10-CM

## 2019-11-22 PROCEDURE — 97110 THERAPEUTIC EXERCISES: CPT | Performed by: PHYSICAL THERAPIST

## 2019-11-22 NOTE — PROGRESS NOTES
Physical Therapy Daily Progress Note    Patient: Hellen Delgado   : 1950  Diagnosis/ICD-10 Code:  Acute pain of left knee [M25.562]  Referring practitioner: Stan Kinsey MD  Date of Initial Visit: Type: THERAPY  Noted: 2019  Today's Date: 2019  Patient seen for 28 sessions           Subjective Evaluation    History of Present Illness    Subjective comment: hamstring is really sore this am, feeling the numbness on the side of the knee a lot today when my pants touch it       Objective       Active Range of Motion   Left Knee   Flexion: 113 degrees   Extension: 0 degrees      See Exercise, Manual, and Modality Logs for complete treatment.       Assessment & Plan     Assessment  Assessment details: Hellen Delgado has been seen for 28 physical therapy sessions for s/p L TKA.  Treatment has included therapeutic exercise, manual therapy, therapeutic activity, neuro-muscular retraining , gait training and patient education with home exercise program . Progress to physical therapy goals is good. Her ROM is good, 0-113 deg and her strength is 4+/5. She is still having difficulty on the stairs, mainly descending, and pain in the biceps femoris tendon/muscle.  She will benefit from continued skilled physical therapy to address remaining impairments and functional limitations.     Prognosis: good    Goals  Plan Goals:  ST weeks  1.Patient will be independent with education for symptom management, joint protection and strategies to minimize stress on affected tissues  - part met  2. Pt to improve knee ROM to ext=4 deg and flex=105 deg - met  3. Patient will ambulate without assistive device community distances with near normal gait  - MET  4. Pt to have a 10 deg or less quad lag with SLR  - met   5. Pt to complete KOS next visit   - met    LT weeks  1. Pt to improve knee ROM to ext=2 deg and flex=115 deg  - Part Met  2.  Patient will increase knee strength to 4 to 4+/5 to improve functional  mobility  -Part Met  3. Pt to improve score on Knee Outcome Survey by 20% for overall functional improvement  - ongoing - improved from 38.7% to 57.5%  4. Patient will demonstrate an independent HEP for core and knee strength and flexibility/ROM - Part Met  5. Pt to have no quad lag with SLR  - MET    Plan  Therapy options: will be seen for skilled physical therapy services  Frequency: 1x week               Timed:    Manual Therapy:         mins  60054;  Therapeutic Exercise:    45     mins  48618;     Neuromuscular John:        mins  03421;    Therapeutic Activity:          mins  07712;     Gait Training:           mins  75229;     Ultrasound:          mins  58133;    Electrical Stimulation:         mins  09455 ( );  Iontophoresis         mins 80304;  Aquatic Therapy         mins 44737;    Untimed:  Electrical Stimulation:         mins  57025 ( );  Mechanical Traction:         mins  80487;     Timed Treatment:   45   mins   Total Treatment:     45   mins  Sierra Ortega, PT  Physical Therapist

## 2019-11-25 ENCOUNTER — TREATMENT (OUTPATIENT)
Dept: PHYSICAL THERAPY | Facility: CLINIC | Age: 69
End: 2019-11-25

## 2019-11-25 DIAGNOSIS — Z96.652 STATUS POST TOTAL LEFT KNEE REPLACEMENT: ICD-10-CM

## 2019-11-25 DIAGNOSIS — M25.562 ACUTE PAIN OF LEFT KNEE: Primary | ICD-10-CM

## 2019-11-25 DIAGNOSIS — R26.9 ABNORMAL GAIT: ICD-10-CM

## 2019-11-25 PROCEDURE — 97110 THERAPEUTIC EXERCISES: CPT | Performed by: PHYSICAL THERAPIST

## 2019-11-25 NOTE — PROGRESS NOTES
Physical Therapy Daily Progress Note    Patient: Hellen Delgado   : 1950  Diagnosis/ICD-10 Code:  Acute pain of left knee [M25.562]  Referring practitioner: Stan Kinsey MD  Date of Initial Visit: Type: THERAPY  Noted: 2019  Today's Date: 2019  Patient seen for 29 sessions           Subjective Evaluation    History of Present Illness    Subjective comment: its sore today, I was on it a lot over the weekend.        Objective   See Exercise, Manual, and Modality Logs for complete treatment.       Assessment & Plan     Assessment  Assessment details: Pt having more stiffness today after being up and around more over the weekend. She is still having HS and calf tightness, but it has improved some with stretching.                Timed:    Manual Therapy:         mins  46734;  Therapeutic Exercise:    45     mins  94296;     Neuromuscular John:        mins  27705;    Therapeutic Activity:          mins  49695;     Gait Training:           mins  46289;     Ultrasound:          mins  47250;    Electrical Stimulation:         mins  21075 ( );  Iontophoresis         mins 55009;  Aquatic Therapy         mins 84726;    Untimed:  Electrical Stimulation:         mins  34372 (MC );  Mechanical Traction:         mins  66534;     Timed Treatment:   45   mins   Total Treatment:     45   mins  Sierra Ortega, PT  Physical Therapist

## 2019-11-27 ENCOUNTER — TREATMENT (OUTPATIENT)
Dept: PHYSICAL THERAPY | Facility: CLINIC | Age: 69
End: 2019-11-27

## 2019-11-27 DIAGNOSIS — R26.9 ABNORMAL GAIT: ICD-10-CM

## 2019-11-27 DIAGNOSIS — Z96.652 STATUS POST TOTAL LEFT KNEE REPLACEMENT: ICD-10-CM

## 2019-11-27 DIAGNOSIS — M25.562 ACUTE PAIN OF LEFT KNEE: Primary | ICD-10-CM

## 2019-11-27 PROCEDURE — 97110 THERAPEUTIC EXERCISES: CPT | Performed by: PHYSICAL THERAPIST

## 2019-11-27 NOTE — PROGRESS NOTES
Physical Therapy Daily Progress Note    Patient: Hellen Delgado   : 1950  Diagnosis/ICD-10 Code:  Acute pain of left knee [M25.562]  Referring practitioner: Stan Kinsey MD  Date of Initial Visit: Type: THERAPY  Noted: 2019  Today's Date: 2019  Patient seen for 30 sessions           Subjective Evaluation    History of Present Illness    Subjective comment: the hamstring pain is starting to let up       Objective       Active Range of Motion   Left Knee   Flexion: 113 degrees   Extension: 0 degrees      See Exercise, Manual, and Modality Logs for complete treatment.       Assessment & Plan     Assessment  Assessment details: Pt is doing better overall with her hamstring pain. She still has tightness and pain in the proximal calf. Her ROM is good, 0-113 deg (some limitations with hamstring pain at the end ROM)               Timed:    Manual Therapy:         mins  02430;  Therapeutic Exercise:    45     mins  36236;     Neuromuscular John:        mins  20170;    Therapeutic Activity:          mins  51051;     Gait Training:           mins  72356;     Ultrasound:          mins  82657;    Electrical Stimulation:         mins  29024 ( );  Iontophoresis         mins 47915;  Aquatic Therapy         mins 54698;    Untimed:  Electrical Stimulation:         mins  97141 ( );  Mechanical Traction:         mins  81263;     Timed Treatment:   45   mins   Total Treatment:     45   mins  Sierra Ortega PT  Physical Therapist

## 2019-12-03 ENCOUNTER — TREATMENT (OUTPATIENT)
Dept: PHYSICAL THERAPY | Facility: CLINIC | Age: 69
End: 2019-12-03

## 2019-12-03 DIAGNOSIS — R26.9 ABNORMAL GAIT: ICD-10-CM

## 2019-12-03 DIAGNOSIS — M25.562 ACUTE PAIN OF LEFT KNEE: Primary | ICD-10-CM

## 2019-12-03 DIAGNOSIS — Z96.652 STATUS POST TOTAL LEFT KNEE REPLACEMENT: ICD-10-CM

## 2019-12-03 PROCEDURE — 97110 THERAPEUTIC EXERCISES: CPT | Performed by: PHYSICAL THERAPIST

## 2019-12-03 PROCEDURE — 97530 THERAPEUTIC ACTIVITIES: CPT | Performed by: PHYSICAL THERAPIST

## 2019-12-03 NOTE — PROGRESS NOTES
Physical Therapy Daily Progress Note    Patient: Hellen Delgado   : 1950  Diagnosis/ICD-10 Code:  Acute pain of left knee [M25.562]  Referring practitioner: Stan Kinsey MD  Date of Initial Visit: Type: THERAPY  Noted: 2019  Today's Date: 12/3/2019  Patient seen for 31 sessions           Subjective Evaluation    History of Present Illness    Subjective comment: I am having no pain today.       Objective   See Exercise, Manual, and Modality Logs for complete treatment.       Assessment & Plan     Assessment  Assessment details: Focused on eccentric control of the quads on a lower step height today. Pt had better control on the 4 in vs 6 in step. Pt with no pain in the calf or hamstring today.               Timed:    Manual Therapy:         mins  50851;  Therapeutic Exercise:    30     mins  96412;     Neuromuscular John:        mins  87880;    Therapeutic Activity:     10     mins  99550;     Gait Training:           mins  14392;     Ultrasound:          mins  54850;    Electrical Stimulation:         mins  91652 ( );  Iontophoresis         mins 57292;  Aquatic Therapy         mins 37104;    Untimed:  Electrical Stimulation:         mins  70803 (MC );  Mechanical Traction:         mins  84385;     Timed Treatment:   40   mins   Total Treatment:     40   mins  Sierra Ortega, PT  Physical Therapist

## 2019-12-11 ENCOUNTER — TREATMENT (OUTPATIENT)
Dept: PHYSICAL THERAPY | Facility: CLINIC | Age: 69
End: 2019-12-11

## 2019-12-11 DIAGNOSIS — R26.9 ABNORMAL GAIT: ICD-10-CM

## 2019-12-11 DIAGNOSIS — M25.562 ACUTE PAIN OF LEFT KNEE: Primary | ICD-10-CM

## 2019-12-11 DIAGNOSIS — Z96.652 STATUS POST TOTAL LEFT KNEE REPLACEMENT: ICD-10-CM

## 2019-12-11 PROCEDURE — 97530 THERAPEUTIC ACTIVITIES: CPT | Performed by: PHYSICAL THERAPIST

## 2019-12-11 PROCEDURE — 97110 THERAPEUTIC EXERCISES: CPT | Performed by: PHYSICAL THERAPIST

## 2019-12-11 NOTE — PROGRESS NOTES
Physical Therapy Daily Progress Note    Patient: Hellen Delgado   : 1950  Diagnosis/ICD-10 Code:  Acute pain of left knee [M25.562]  Referring practitioner: Stan Kinsey MD  Date of Initial Visit: Type: THERAPY  Noted: 2019  Today's Date: 2019  Patient seen for 32 sessions           Subjective Evaluation    History of Present Illness    Subjective comment: things are going well. No pain in the hamstring       Objective   See Exercise, Manual, and Modality Logs for complete treatment.       Assessment & Plan     Assessment  Assessment details: Pt overall is doing very well. She still has some strength deficits, which will take time to improve, but she is having no pain in the hamstrings like she had been for the past couple of months. Plan on DC next week               Timed:    Manual Therapy:         mins  71244;  Therapeutic Exercise:    35     mins  85882;     Neuromuscular John:        mins  50031;    Therapeutic Activity:     10     mins  40427;     Gait Training:           mins  83220;     Ultrasound:          mins  02344;    Electrical Stimulation:         mins  29921 ( );  Iontophoresis         mins 34717;  Aquatic Therapy         mins 37625;    Untimed:  Electrical Stimulation:         mins  73333 ( );  Mechanical Traction:         mins  57596;     Timed Treatment:   45   mins   Total Treatment:     45   mins  Sierra Ortega, PT  Physical Therapist

## 2019-12-18 ENCOUNTER — TREATMENT (OUTPATIENT)
Dept: PHYSICAL THERAPY | Facility: CLINIC | Age: 69
End: 2019-12-18

## 2019-12-18 DIAGNOSIS — Z96.652 STATUS POST TOTAL LEFT KNEE REPLACEMENT: ICD-10-CM

## 2019-12-18 DIAGNOSIS — R26.9 ABNORMAL GAIT: ICD-10-CM

## 2019-12-18 DIAGNOSIS — M25.562 ACUTE PAIN OF LEFT KNEE: Primary | ICD-10-CM

## 2019-12-18 PROCEDURE — 97110 THERAPEUTIC EXERCISES: CPT | Performed by: PHYSICAL THERAPIST

## 2019-12-18 NOTE — PROGRESS NOTES
Physical Therapy Daily Progress Note    Patient: Hellen Delgado   : 1950  Diagnosis/ICD-10 Code:  Acute pain of left knee [M25.562]  Referring practitioner: Stan Kinsey MD  Date of Initial Visit: Type: THERAPY  Noted: 2019  Today's Date: 2019  Patient seen for 33 sessions           Subjective     Objective       Active Range of Motion   Left Knee   Flexion: 113 degrees   Extension: 0 degrees     Strength/Myotome Testing     Left Knee   Flexion: 4+  Extension: 4+     See Exercise, Manual, and Modality Logs for complete treatment.       Assessment & Plan     Assessment  Assessment details:  Hellen Delgado was seen for 33 physical therapy sessions for s/p L TKA.  Treatment included therapeutic exercise, manual therapy, therapeutic activity, gait training and patient education with home exercise program . Progress to physical therapy goals was excellent. She has improved her ROM to 0-113 deg, able to perform stairs with a reciprocal pattern, and minimal c/o pain, mainly soreness. She was discharged to an independent HEP and provided patient education to self-manage condition.       Goals  Plan Goals: ST weeks  1.Patient will be independent with education for symptom management, joint protection and strategies to minimize stress on affected tissues  -MET  2. Pt to improve knee ROM to ext=4 deg and flex=105 deg - met  3. Patient will ambulate without assistive device community distances with near normal gait  - MET  4. Pt to have a 10 deg or less quad lag with SLR  - met   5. Pt to complete KOS next visit   - met    LT weeks  1. Pt to improve knee ROM to ext=2 deg and flex=115 deg  - Part Met (0-113)  2.  Patient will increase knee strength to 4 to 4+/5 to improve functional mobility  -MET  3. Pt to improve score on Knee Outcome Survey by 20% for overall functional improvement  - MET - improved from 38.7% to 57.5% to 79%  4. Patient will demonstrate an independent HEP for core and knee  strength and flexibility/ROM -MET  5. Pt to have no quad lag with SLR  - MET               Timed:    Manual Therapy:         mins  78509;  Therapeutic Exercise:    45     mins  27661;     Neuromuscular John:        mins  46523;    Therapeutic Activity:          mins  66254;     Gait Training:           mins  42696;     Ultrasound:          mins  21500;    Electrical Stimulation:         mins  45672 ( );  Iontophoresis         mins 21944;  Aquatic Therapy         mins 00553;    Untimed:  Electrical Stimulation:         mins  72258 ( );  Mechanical Traction:         mins  93771;     Timed Treatment:   45   mins   Total Treatment:     45   mins  Sierra Ortega, PT  Physical Therapist

## 2020-08-11 ENCOUNTER — OFFICE VISIT (OUTPATIENT)
Dept: ORTHOPEDIC SURGERY | Facility: CLINIC | Age: 70
End: 2020-08-11

## 2020-08-11 VITALS — HEIGHT: 65 IN | WEIGHT: 195 LBS | BODY MASS INDEX: 32.49 KG/M2

## 2020-08-11 DIAGNOSIS — Z96.652 STATUS POST TOTAL LEFT KNEE REPLACEMENT: Primary | ICD-10-CM

## 2020-08-11 PROCEDURE — 73562 X-RAY EXAM OF KNEE 3: CPT | Performed by: ORTHOPAEDIC SURGERY

## 2020-08-11 PROCEDURE — 99212 OFFICE O/P EST SF 10 MIN: CPT | Performed by: ORTHOPAEDIC SURGERY

## 2020-08-11 RX ORDER — BETAMETHASONE DIPROPIONATE 0.5 MG/G
CREAM TOPICAL
COMMUNITY
Start: 2020-07-27

## 2020-08-11 RX ORDER — LEVOTHYROXINE SODIUM 0.12 MG/1
TABLET ORAL
COMMUNITY
Start: 2020-06-24 | End: 2021-08-17

## 2020-08-11 NOTE — PROGRESS NOTES
"Hellen Delgado : 1950 MRN: [id number] DATE: 2020    DIAGNOSIS: Annual follow up left total knee      SUBJECTIVE:Patient returns today for  1 year follow up of left total knee replacement. Patient reports doing well with no unusual complaints. Denies any limitations due to the knee.    OBJECTIVE:    Ht 165.1 cm (65\")   Wt 88.5 kg (195 lb)   BMI 32.45 kg/m²   Family History   Problem Relation Age of Onset   • Colon cancer Other    • Lung disease Other    • Heart disease Other    • Clotting disorder Other    • Hypertension Other      Past Medical History:   Diagnosis Date   • Anxiety    • Arthritis    • Bronchitis    • Depression    • Disease of thyroid gland     underactive thyroid   • GERD (gastroesophageal reflux disease)    • Hypercholesteremia    • Left knee pain    • Migraines    • Osteopenia    • PONV (postoperative nausea and vomiting)    • Sleep apnea     CPAP   • Vertigo      Past Surgical History:   Procedure Laterality Date   • BACK SURGERY  2008   • COLONOSCOPY     • FOREIGN BODY REMOVAL      LEFT FOOT   • GALLBLADDER SURGERY     • HYSTERECTOMY     • KNEE ARTHROSCOPY  1988   • KNEE ARTHROSCOPY Left 2016    Procedure: LEFT KNEE ARTHROSCOPY AND PARTIAL MEDIAL AND LATERAL MENISECTOMIES; DEBRIDEMENT ARTHRITIS;  Surgeon: Starr Thomas MD;  Location: [facility];  Service:    • THORACIC SPINE SURGERY  2001   • TOTAL KNEE ARTHROPLASTY Left 2019    Procedure: TOTAL KNEE ARTHROPLASTY;  Surgeon: Stan Kinsey MD;  Location: [facility];  Service: Orthopedics     Social History     Socioeconomic History   • Marital status:      Spouse name: Not on file   • Number of children: Not on file   • Years of education: Not on file   • Highest education level: Not on file   Tobacco Use   • Smoking status: Never Smoker   • Smokeless tobacco: Never Used   Substance and Sexual Activity   • Alcohol use: No     Comment: quit over a year ago   • Drug use: No   • Sexual " activity: Defer     Review of Systems - a 14 point review of systems was performed. All systems were negative.    Exam:. The incision is well healed. Range of motion is measured at 0 to 108. The calf is soft and nontender with a negative Homans sign. Alignment is neutral. Good quad strength. There is no evidence of varus/valgus or flexion instability. No effusion. Intact to light touch with palpable distal pulses.     DIAGNOSTIC STUDIES  Xrays: 3 views of the left knee (AP, lateral, and sunrise) were ordered and reviewed for evaluation of recent knee replacement. They demonstrate a well positioned, well aligned knee replacement without complicating factors noted. In comparison with previous films there has been no change.    ASSESSMENT: Annual follow up left knee replacement.    PLAN:  Continue activities as tolerated    Follow up in 1 year      Stan Kinsey MD  8/11/2020

## 2020-10-23 ENCOUNTER — TELEPHONE (OUTPATIENT)
Dept: ORTHOPEDIC SURGERY | Facility: CLINIC | Age: 70
End: 2020-10-23

## 2020-10-23 NOTE — TELEPHONE ENCOUNTER
Provider: KAPIL GUNDERSON  Caller: TIM DANGELO  Relationship to Patient: SELF  Phone Number: [phone]  Reason for Call: PT  HAD A CHICKEN POX SHOT WANTED TO KNOW IF IT WILL INTERFERE WITH HER CORTISONE INJECTION THAT IS SCHEDULED

## 2020-11-13 ENCOUNTER — CLINICAL SUPPORT (OUTPATIENT)
Dept: ORTHOPEDIC SURGERY | Facility: CLINIC | Age: 70
End: 2020-11-13

## 2020-11-13 VITALS — BODY MASS INDEX: 32.49 KG/M2 | WEIGHT: 195 LBS | HEIGHT: 65 IN | TEMPERATURE: 97.7 F

## 2020-11-13 DIAGNOSIS — M70.61 TROCHANTERIC BURSITIS OF RIGHT HIP: Primary | ICD-10-CM

## 2020-11-13 PROCEDURE — 20610 DRAIN/INJ JOINT/BURSA W/O US: CPT | Performed by: NURSE PRACTITIONER

## 2020-11-13 PROCEDURE — 73501 X-RAY EXAM HIP UNI 1 VIEW: CPT | Performed by: NURSE PRACTITIONER

## 2020-11-13 PROCEDURE — 99213 OFFICE O/P EST LOW 20 MIN: CPT | Performed by: NURSE PRACTITIONER

## 2020-11-13 RX ORDER — METHYLPREDNISOLONE ACETATE 80 MG/ML
80 INJECTION, SUSPENSION INTRA-ARTICULAR; INTRALESIONAL; INTRAMUSCULAR; SOFT TISSUE
Status: COMPLETED | OUTPATIENT
Start: 2020-11-13 | End: 2020-11-13

## 2020-11-13 RX ADMIN — METHYLPREDNISOLONE ACETATE 80 MG: 80 INJECTION, SUSPENSION INTRA-ARTICULAR; INTRALESIONAL; INTRAMUSCULAR; SOFT TISSUE at 08:35

## 2020-11-13 NOTE — PROGRESS NOTES
"Patient: Hellen Delgado  YOB: 1950 69 y.o. female  Medical Record Number: [id number]    Chief Complaints:   Chief Complaint   Patient presents with   • Right Hip - Follow-up, Pain       History of Present Illness:Hellen Delgado is a 69 y.o. female who presents with complaints of right lateral hip pain.  The patient denies any recent injury.  Has a history of right hip greater trochanteric bursitis.  Reports that the hip started becoming sore a few weeks ago has progressively gotten worse, worse at night when she lies on her side better when she changes position describes it as a ache    Allergies:   Allergies   Allergen Reactions   • Gabapentin      MIGRAINE   • Telithromycin Other (See Comments)     PALPITATIONS LIVER ABNORMALITY  palpitiaotns  Liver anbnl  CAN TAKE ZPAK OK    • Cephalexin Palpitations and Rash   • Tegaderm Ag Mesh 2\"X2\" [Wound Dressings] Rash   • Thyroid Rash     [place] thyroid       Medications:   Current Outpatient Medications   Medication Sig Dispense Refill   • amoxicillin (AMOXIL) 500 MG capsule TAKE 4 CAPSULES BY MOUTH ONCE AS NEEDED (DENTAL VISIT AT LEAST ONE HOUR BEFORE) FOR UP TO 1 DOSE.  3   • atorvastatin (LIPITOR) 40 MG tablet Take 40 mg by mouth Every Night.     • betamethasone dipropionate (DIPROLENE) 0.05 % cream      • buPROPion SR (WELLBUTRIN SR) 150 MG 12 hr tablet Take 150 mg by mouth 2 (Two) Times a Day.  3   • fexofenadine (ALLEGRA) 180 MG tablet Take 180 mg by mouth As Needed.     • hydrocortisone 2.5 % ointment Apply 1 application topically to the appropriate area as directed Daily As Needed.     • lansoprazole (PREVACID) 30 MG capsule Take 30 mg by mouth As Needed.     • SYNTHROID 125 MCG tablet TAKE 1 TABLET BY MOUTH DAILY BRAND NAME ONLY SYNTHROID.  1   • triamcinolone (KENALOG) 0.1 % cream Apply 1 application topically to the appropriate area as directed 2 (Two) Times a Day As Needed. PRN     • levothyroxine (Synthroid) 125 MCG tablet TAKE 1 TABLET " "BY MOUTH DAILY BRAND NAME ONLY SYNTHROID.       No current facility-administered medications for this visit.      Facility-Administered Medications Ordered in Other Visits   Medication Dose Route Frequency Provider Last Rate Last Dose   • mupirocin (BACTROBAN) 2 % nasal ointment   Nasal BID Stan Kinsey MD             The following portions of the patient's history were reviewed and updated as appropriate: allergies, current medications, past family history, past medical history, past social history, past surgical history and problem list.    Review of Systems:   A 14 point review of systems was performed. All systems negative except pertinent positives/negative listed in HPI above    Physical Exam:   Vitals:    11/13/20 1517   Temp: 97.7 °F (36.5 °C)   TempSrc: Temporal   Weight: 88.5 kg (195 lb)   Height: 165.1 cm (65\")   PainSc:   7   PainLoc: Hip       General: A and O x 3, ASA, NAD    SCLERA:    Normal    DENTITION:   Normal  Skin clear no unusual lesions noted  Right hip patient is tender palpation of right hip greater trochanteric bursa otherwise has normal internal X rotation with a negative Stinchfield negative logroll calf soft and nontender    Radiology:  Xrays 2 views of right hip ordered and reviewed today secondary to pain and show minimal arthritic changes.  Compared to views are unchanged    Assessment/Plan:  Right hip greater trochanteric bursitis    Patient I discussed treatment options, she would like to proceed with right hip greater trochanteric bursa injection.  She will continue with stretching exercises we will see her back as needed    Large Joint Arthrocentesis: R greater trochanteric bursa  Date/Time: 11/13/2020 8:35 AM  Consent given by: patient  Timeout: Immediately prior to procedure a time out was called to verify the correct patient, procedure, equipment, support staff and site/side marked as required   Supporting Documentation  Indications: pain   Procedure Details  Location: hip - " R greater trochanteric bursa  Needle gauge: 21.  Approach: lateral  Medications administered: 2 mL lidocaine (cardiac); 80 mg methylPREDNISolone acetate 80 MG/ML  Patient tolerance: patient tolerated the procedure well with no immediate complications          At the conclusion of the injection I discussed the importance of continued quad strengthening exercises on a daily basis. I will see the patient back if the symptoms should fail to improve or worsen.

## 2021-02-18 ENCOUNTER — CLINICAL SUPPORT (OUTPATIENT)
Dept: ORTHOPEDIC SURGERY | Facility: CLINIC | Age: 71
End: 2021-02-18

## 2021-02-18 VITALS — HEIGHT: 65 IN | WEIGHT: 207 LBS | BODY MASS INDEX: 34.49 KG/M2 | TEMPERATURE: 98 F

## 2021-02-18 DIAGNOSIS — M70.61 TROCHANTERIC BURSITIS OF RIGHT HIP: Primary | ICD-10-CM

## 2021-02-18 PROCEDURE — 20610 DRAIN/INJ JOINT/BURSA W/O US: CPT | Performed by: NURSE PRACTITIONER

## 2021-02-18 RX ORDER — METHYLPREDNISOLONE ACETATE 80 MG/ML
80 INJECTION, SUSPENSION INTRA-ARTICULAR; INTRALESIONAL; INTRAMUSCULAR; SOFT TISSUE
Status: COMPLETED | OUTPATIENT
Start: 2021-02-18 | End: 2021-02-18

## 2021-02-18 RX ADMIN — METHYLPREDNISOLONE ACETATE 80 MG: 80 INJECTION, SUSPENSION INTRA-ARTICULAR; INTRALESIONAL; INTRAMUSCULAR; SOFT TISSUE at 08:02

## 2021-02-18 NOTE — PROGRESS NOTES
"Large Joint Arthrocentesis: R greater trochanteric bursa  Date/Time: 2/18/2021 8:02 AM  Consent given by: patient  Site marked: site marked  Timeout: Immediately prior to procedure a time out was called to verify the correct patient, procedure, equipment, support staff and site/side marked as required   Supporting Documentation  Indications: pain and joint swelling   Procedure Details  Location: hip - R greater trochanteric bursa  Preparation: Patient was prepped and draped in the usual sterile fashion  Needle size: 22 G  Approach: anterolateral  Medications administered: 2 mL lidocaine (cardiac); 80 mg methylPREDNISolone acetate 80 MG/ML  Patient tolerance: patient tolerated the procedure well with no immediate complications        Patient: Hellen Delgado  YOB: 1950 70 y.o. female  Medical Record Number: [id number]    Chief Complaints:   Chief Complaint   Patient presents with   • Right Hip - Follow-up, Pain       History of Present Illness:Hellen Delgado is a 70 y.o. female who presents with complaints of significant right lateral hip pain.  Patient denies any injury, describes the hip soreness as a moderate dull ache worse at night when she lies on her side better with change in position    Allergies:   Allergies   Allergen Reactions   • Gabapentin      MIGRAINE   • Telithromycin Other (See Comments)     PALPITATIONS LIVER ABNORMALITY  palpitiaotns  Liver anbnl  CAN TAKE ZPAK OK    • Cephalexin Palpitations and Rash   • Tegaderm Ag Mesh 2\"X2\" [Wound Dressings] Rash   • Thyroid Rash     [place] thyroid       Medications:   Current Outpatient Medications   Medication Sig Dispense Refill   • amoxicillin (AMOXIL) 500 MG capsule TAKE 4 CAPSULES BY MOUTH ONCE AS NEEDED (DENTAL VISIT AT LEAST ONE HOUR BEFORE) FOR UP TO 1 DOSE.  3   • atorvastatin (LIPITOR) 40 MG tablet Take 40 mg by mouth Every Night.     • betamethasone dipropionate (DIPROLENE) 0.05 % cream      • buPROPion SR (WELLBUTRIN SR) 150 " "MG 12 hr tablet Take 150 mg by mouth 2 (Two) Times a Day.  3   • fexofenadine (ALLEGRA) 180 MG tablet Take 180 mg by mouth As Needed.     • hydrocortisone 2.5 % ointment Apply 1 application topically to the appropriate area as directed Daily As Needed.     • lansoprazole (PREVACID) 30 MG capsule Take 30 mg by mouth As Needed.     • levothyroxine (Synthroid) 125 MCG tablet TAKE 1 TABLET BY MOUTH DAILY BRAND NAME ONLY SYNTHROID.     • SYNTHROID 125 MCG tablet TAKE 1 TABLET BY MOUTH DAILY BRAND NAME ONLY SYNTHROID.  1   • triamcinolone (KENALOG) 0.1 % cream Apply 1 application topically to the appropriate area as directed 2 (Two) Times a Day As Needed. PRN       No current facility-administered medications for this visit.      Facility-Administered Medications Ordered in Other Visits   Medication Dose Route Frequency Provider Last Rate Last Admin   • mupirocin (BACTROBAN) 2 % nasal ointment   Nasal BID Stan Kinsey MD             The following portions of the patient's history were reviewed and updated as appropriate: allergies, current medications, past family history, past medical history, past social history, past surgical history and problem list.    Review of Systems:   A 14 point review of systems was performed. All systems negative except pertinent positives/negative listed in HPI above    Physical Exam:   Vitals:    02/18/21 0954   Temp: 98 °F (36.7 °C)   Weight: 93.9 kg (207 lb)   Height: 165.1 cm (65\")   PainSc:   4       General: A and O x 3, ASA, NAD    SCLERA:    Normal    DENTITION:   Normal  Skin clear no unusual lesions noted  Right hip patient is tender to palpation of her right hip greater trochanteric bursa she otherwise has normal internal X rotation with a negative [facility] negative logroll calf soft and nontender    Radiology:  Xrays 2 views of right hip were reviewed show minimal arthritic changes that were done previously    Assessment/Plan:  Right hip greater trochanteric " bursitis    Patient discussed options, she would like to proceed with right hip bursa injection, she was for to outpatient physical therapy and we will see her back as needed    Erica Marmolejo, APRN

## 2021-03-19 ENCOUNTER — BULK ORDERING (OUTPATIENT)
Dept: CASE MANAGEMENT | Facility: OTHER | Age: 71
End: 2021-03-19

## 2021-03-19 DIAGNOSIS — Z23 IMMUNIZATION DUE: ICD-10-CM

## 2021-08-17 ENCOUNTER — OFFICE VISIT (OUTPATIENT)
Dept: ORTHOPEDIC SURGERY | Facility: CLINIC | Age: 71
End: 2021-08-17

## 2021-08-17 VITALS — BODY MASS INDEX: 33.49 KG/M2 | HEIGHT: 65 IN | TEMPERATURE: 97.7 F | WEIGHT: 201 LBS

## 2021-08-17 DIAGNOSIS — R52 PAIN: Primary | ICD-10-CM

## 2021-08-17 DIAGNOSIS — Z96.652 STATUS POST LEFT KNEE REPLACEMENT: ICD-10-CM

## 2021-08-17 PROCEDURE — 73562 X-RAY EXAM OF KNEE 3: CPT | Performed by: ORTHOPAEDIC SURGERY

## 2021-08-17 PROCEDURE — 99212 OFFICE O/P EST SF 10 MIN: CPT | Performed by: ORTHOPAEDIC SURGERY

## 2021-08-17 RX ORDER — MULTIPLE VITAMINS W/ MINERALS TAB 9MG-400MCG
TAB ORAL
COMMUNITY

## 2021-08-17 NOTE — PROGRESS NOTES
"Hellen Delgado : 1950 MRN: [id number] DATE: 2021    DIAGNOSIS: Annual follow up left total knee      SUBJECTIVE:Patient returns today for  2 year follow up of left total knee replacement. Patient reports doing well with no unusual complaints. Denies any limitations due to the knee.    OBJECTIVE:    Temp 97.7 °F (36.5 °C)   Ht 164.5 cm (64.75\")   Wt 91.2 kg (201 lb)   BMI 33.71 kg/m²   Family History   Problem Relation Age of Onset   • Colon cancer Other    • Lung disease Other    • Heart disease Other    • Clotting disorder Other    • Hypertension Other    • Cancer Mother         Colon     Past Medical History:   Diagnosis Date   • Anxiety    • Arthritis    • Bronchitis    • Bursitis of hip     recv'd injections   • Depression    • Disease of thyroid gland     underactive thyroid   • GERD (gastroesophageal reflux disease)    • Hypercholesteremia    • Left knee pain    • Migraines    • Osteopenia    • PONV (postoperative nausea and vomiting)    • Sleep apnea     CPAP   • Vertigo      Past Surgical History:   Procedure Laterality Date   • BACK SURGERY  2008   • COLONOSCOPY     • FOOT SURGERY      removal of glass left foot   • FOREIGN BODY REMOVAL      LEFT FOOT   • GALLBLADDER SURGERY     • HYSTERECTOMY     • KNEE ARTHROSCOPY  1988   • KNEE ARTHROSCOPY Left 2016    Procedure: LEFT KNEE ARTHROSCOPY AND PARTIAL MEDIAL AND LATERAL MENISECTOMIES; DEBRIDEMENT ARTHRITIS;  Surgeon: Starr Thomas MD;  Location: [facility], [place];  Service:    • THORACIC SPINE SURGERY  2001   • TOTAL KNEE ARTHROPLASTY Left 2019    Procedure: TOTAL KNEE ARTHROPLASTY;  Surgeon: Stan Kinsey MD;  Location: [facility];  Service: Orthopedics     Social History     Socioeconomic History   • Marital status:      Spouse name: Not on file   • Number of children: Not on file   • Years of education: Not on file   • Highest education level: Not on file   Tobacco Use   • Smoking status: Never Smoker "   • Smokeless tobacco: Never Used   Substance and Sexual Activity   • Alcohol use: No     Comment: quit over a year ago   • Drug use: No   • Sexual activity: Yes     Partners: Male     Birth control/protection: Other     Comment: Hysterectomy 1978     Review of Systems - a 14 point review of systems was performed. All systems were negative.    Exam:. The incision is well healed. Range of motion is measured at 0 to 125. The calf is soft and nontender with a negative Homans sign. Alignment is neutral. Good quad strength. There is no evidence of varus/valgus or flexion instability. No effusion. Intact to light touch with palpable distal pulses.     DIAGNOSTIC STUDIES  Xrays: 3 views of the left knee (AP, lateral, and sunrise) were ordered and reviewed for evaluation of recent knee replacement. They demonstrate a well positioned, well aligned knee replacement without complicating factors noted. In comparison with previous films there has been no change.    ASSESSMENT: Annual follow up left knee replacement.    PLAN:  Continue activities as tolerated    Follow up gucci Kinsey MD  8/17/2021

## 2022-06-22 ENCOUNTER — OFFICE (AMBULATORY)
Dept: URBAN - METROPOLITAN AREA CLINIC 75 | Facility: CLINIC | Age: 72
End: 2022-06-22

## 2022-06-22 VITALS
HEIGHT: 65 IN | SYSTOLIC BLOOD PRESSURE: 120 MMHG | DIASTOLIC BLOOD PRESSURE: 76 MMHG | HEART RATE: 62 BPM | WEIGHT: 219 LBS | OXYGEN SATURATION: 98 %

## 2022-06-22 DIAGNOSIS — R10.11 RIGHT UPPER QUADRANT PAIN: ICD-10-CM

## 2022-06-22 DIAGNOSIS — K58.9 IRRITABLE BOWEL SYNDROME WITHOUT DIARRHEA: ICD-10-CM

## 2022-06-22 DIAGNOSIS — Z80.0 FAMILY HISTORY OF MALIGNANT NEOPLASM OF DIGESTIVE ORGANS: ICD-10-CM

## 2022-06-22 DIAGNOSIS — R14.0 ABDOMINAL DISTENSION (GASEOUS): ICD-10-CM

## 2022-06-22 PROCEDURE — 99204 OFFICE O/P NEW MOD 45 MIN: CPT | Performed by: INTERNAL MEDICINE

## 2022-06-22 RX ORDER — RIFAXIMIN 550 MG/1
1650 TABLET ORAL
Qty: 42 | Refills: 0 | Status: COMPLETED
Start: 2022-06-22 | End: 2022-08-25

## 2022-08-17 VITALS
HEART RATE: 59 BPM | DIASTOLIC BLOOD PRESSURE: 106 MMHG | OXYGEN SATURATION: 97 % | RESPIRATION RATE: 22 BRPM | DIASTOLIC BLOOD PRESSURE: 62 MMHG | RESPIRATION RATE: 12 BRPM | DIASTOLIC BLOOD PRESSURE: 73 MMHG | TEMPERATURE: 97.5 F | RESPIRATION RATE: 19 BRPM | TEMPERATURE: 97.7 F | HEART RATE: 61 BPM | OXYGEN SATURATION: 100 % | DIASTOLIC BLOOD PRESSURE: 55 MMHG | SYSTOLIC BLOOD PRESSURE: 118 MMHG | SYSTOLIC BLOOD PRESSURE: 125 MMHG | OXYGEN SATURATION: 96 % | RESPIRATION RATE: 16 BRPM | HEART RATE: 70 BPM | DIASTOLIC BLOOD PRESSURE: 74 MMHG | HEART RATE: 65 BPM | SYSTOLIC BLOOD PRESSURE: 123 MMHG | SYSTOLIC BLOOD PRESSURE: 143 MMHG | SYSTOLIC BLOOD PRESSURE: 122 MMHG | DIASTOLIC BLOOD PRESSURE: 52 MMHG | RESPIRATION RATE: 26 BRPM | HEART RATE: 57 BPM | RESPIRATION RATE: 18 BRPM | OXYGEN SATURATION: 99 % | HEART RATE: 69 BPM | WEIGHT: 215 LBS | SYSTOLIC BLOOD PRESSURE: 94 MMHG | HEIGHT: 65 IN

## 2022-08-25 ENCOUNTER — AMBULATORY SURGICAL CENTER (AMBULATORY)
Dept: URBAN - METROPOLITAN AREA SURGERY 17 | Facility: SURGERY | Age: 72
End: 2022-08-25

## 2022-08-25 ENCOUNTER — OFFICE (AMBULATORY)
Dept: URBAN - METROPOLITAN AREA PATHOLOGY 4 | Facility: PATHOLOGY | Age: 72
End: 2022-08-25

## 2022-08-25 DIAGNOSIS — K31.89 OTHER DISEASES OF STOMACH AND DUODENUM: ICD-10-CM

## 2022-08-25 DIAGNOSIS — R14.0 ABDOMINAL DISTENSION (GASEOUS): ICD-10-CM

## 2022-08-25 DIAGNOSIS — R10.11 RIGHT UPPER QUADRANT PAIN: ICD-10-CM

## 2022-08-25 DIAGNOSIS — K29.70 GASTRITIS, UNSPECIFIED, WITHOUT BLEEDING: ICD-10-CM

## 2022-08-25 DIAGNOSIS — K22.2 ESOPHAGEAL OBSTRUCTION: ICD-10-CM

## 2022-08-25 LAB
GI HISTOLOGY: A. SELECT: (no result)
GI HISTOLOGY: B. SELECT: (no result)
GI HISTOLOGY: C. UNSPECIFIED: (no result)
GI HISTOLOGY: PDF REPORT: (no result)

## 2022-08-25 PROCEDURE — 88305 TISSUE EXAM BY PATHOLOGIST: CPT | Performed by: INTERNAL MEDICINE

## 2022-08-25 PROCEDURE — 43239 EGD BIOPSY SINGLE/MULTIPLE: CPT | Performed by: INTERNAL MEDICINE

## 2022-10-10 ENCOUNTER — OFFICE (AMBULATORY)
Dept: URBAN - METROPOLITAN AREA CLINIC 75 | Facility: CLINIC | Age: 72
End: 2022-10-10

## 2022-10-10 VITALS — WEIGHT: 214 LBS | HEIGHT: 65 IN

## 2022-10-10 DIAGNOSIS — R10.11 RIGHT UPPER QUADRANT PAIN: ICD-10-CM

## 2022-10-10 DIAGNOSIS — K58.9 IRRITABLE BOWEL SYNDROME WITHOUT DIARRHEA: ICD-10-CM

## 2022-10-10 DIAGNOSIS — R14.0 ABDOMINAL DISTENSION (GASEOUS): ICD-10-CM

## 2022-10-10 DIAGNOSIS — Z80.0 FAMILY HISTORY OF MALIGNANT NEOPLASM OF DIGESTIVE ORGANS: ICD-10-CM

## 2022-10-10 PROCEDURE — 99214 OFFICE O/P EST MOD 30 MIN: CPT | Performed by: NURSE PRACTITIONER

## 2024-07-05 ENCOUNTER — OFFICE VISIT (OUTPATIENT)
Dept: ORTHOPEDIC SURGERY | Facility: CLINIC | Age: 74
End: 2024-07-05
Payer: MEDICARE

## 2024-07-05 VITALS — TEMPERATURE: 97.1 F | BODY MASS INDEX: 37.92 KG/M2 | WEIGHT: 227.6 LBS | HEIGHT: 65 IN

## 2024-07-05 DIAGNOSIS — S79.912A HIP INJURY, LEFT, INITIAL ENCOUNTER: ICD-10-CM

## 2024-07-05 DIAGNOSIS — R52 PAIN: Primary | ICD-10-CM

## 2024-07-05 RX ORDER — PANTOPRAZOLE SODIUM 40 MG/1
40 TABLET, DELAYED RELEASE ORAL DAILY
COMMUNITY

## 2024-07-05 RX ORDER — BUPROPION HYDROCHLORIDE 150 MG/1
1 TABLET, FILM COATED, EXTENDED RELEASE ORAL EVERY 12 HOURS SCHEDULED
COMMUNITY
Start: 2024-05-12

## 2024-07-05 RX ORDER — AZELASTINE HYDROCHLORIDE 137 UG/1
30 SPRAY, METERED NASAL 2 TIMES DAILY
COMMUNITY
Start: 2024-06-28

## 2024-07-05 NOTE — PROGRESS NOTES
"Patient: Hellen Delgado  YOB: 1950 73 y.o. female  Medical Record Number: [id number]    Chief Complaints:   Chief Complaint   Patient presents with    Left Hip - Establish Care, Pain       History of Present Illness:Hellen Delgado is a 73 y.o. female who presents with complaints of left hip injury.  The patient reports 2 months ago she was going down some stairs outside her sandal caught she fell forward extended and twisted her left hip, acute onset of pain which has persisted since.  She has tried conservative measures including anti-inflammatories with minimal improvement    Allergies:   Allergies   Allergen Reactions    Gabapentin      MIGRAINE    Telithromycin Other (See Comments)     PALPITATIONS LIVER ABNORMALITY  palpitiaotns  Liver anbnl  CAN TAKE ZPAK OK     Cephalexin Palpitations and Rash    Tegaderm Ag Mesh 2\"X2\" [Wound Dressings] Rash    Thyroid Rash     [place] thyroid       Medications:   Current Outpatient Medications   Medication Sig Dispense Refill    atorvastatin (LIPITOR) 40 MG tablet Take 1 tablet by mouth Every Night.      Azelastine HCl 137 MCG/SPRAY solution Inhale 200 sprays 2 (Two) Times a Day.      betamethasone dipropionate (DIPROLENE) 0.05 % cream       buPROPion (ZYBAN) 150 MG 12 hr tablet Take 150 mg by mouth Every 12 (Twelve) Hours.      buPROPion SR (WELLBUTRIN SR) 150 MG 12 hr tablet Take 1 tablet by mouth 2 (Two) Times a Day.  3    fexofenadine (ALLEGRA) 180 MG tablet Take 1 tablet by mouth As Needed.      Melatonin 5 MG capsule Take 5 mg by mouth Daily.      pantoprazole (PROTONIX) 40 MG EC tablet Take 1 tablet by mouth Daily.      SYNTHROID 125 MCG tablet TAKE 1 TABLET BY MOUTH DAILY BRAND NAME ONLY SYNTHROID.  1    triamcinolone (KENALOG) 0.1 % cream Apply 1 Application topically to the appropriate area as directed 2 (Two) Times a Day As Needed. PRN      lansoprazole (PREVACID) 30 MG capsule Take 30 mg by mouth As Needed.      multivitamin with minerals " "(Centrum Silver 50+Women) tablet tablet       vitamin D3 (Vitamin D) 125 MCG (5000 UT) capsule capsule        No current facility-administered medications for this visit.     Facility-Administered Medications Ordered in Other Visits   Medication Dose Route Frequency Provider Last Rate Last Admin    mupirocin (BACTROBAN) 2 % nasal ointment   Nasal BID Stan Kinsey MD             The following portions of the patient's history were reviewed and updated as appropriate: allergies, current medications, past family history, past medical history, past social history, past surgical history and problem list.    Review of Systems:   14 point review of systems was performed. All systems negative except pertinent positives/negatives listed in HPI above    Physical Exam:   Vitals:    07/05/24 0906   Temp: 97.1 °F (36.2 °C)   Weight: 103 kg (227 lb 9.6 oz)   Height: 164.5 cm (64.75\")   PainSc:   6       General: A and O x 3, ASA, NAD    Left hip patient is very tender to palpation over left hip bursa she has pain with internal ex rotation with a negative Stinchfield negative logroll calf is soft and nontender      Radiology:  Xrays 2 views of left hip were ordered and reviewed today secondary to injury show mild arthritic changes.  No comparative views available    Assessment/Plan: Left hip injury    And I discussed options, since she still having such significant pain 2 months out would recommend an MRI of the left hip to further evaluate she will continue to limit activity and range of motion into we get the results of the MRI we will continue with Tylenol as needed until that time      Erica Marmolejo, APRN  7/5/2024  Answers submitted by the patient for this visit:  Primary Reason for Visit (Submitted on 6/24/2024)  What is the primary reason for your visit?: Extremity Pain  Lower Extremity Injury Questionnaire (Submitted on 6/24/2024)  Chief Complaint: Extremity pain  Injury: Yes  Time since trauma: 6 Weeks  Injury " location: at home  Injury mechanism: a fall  Pain location: left hip, left upper leg  Pain quality: aching  Progression since onset: unchanged  tingling: Yes  inability to bear weight: Yes  numbness: Yes  lower extremity swelling: No  redness: No  Foreign body present: no foreign bodies  Additional information: unable to lay on left side without pain in leg/hip

## 2024-07-16 ENCOUNTER — HOSPITAL ENCOUNTER (OUTPATIENT)
Dept: MRI IMAGING | Facility: HOSPITAL | Age: 74
Discharge: HOME | End: 2024-07-16
Admitting: NURSE PRACTITIONER
Payer: MEDICARE

## 2024-07-16 DIAGNOSIS — S79.912A HIP INJURY, LEFT, INITIAL ENCOUNTER: ICD-10-CM

## 2024-07-16 PROCEDURE — 73721 MRI JNT OF LWR EXTRE W/O DYE: CPT

## 2024-07-22 DIAGNOSIS — S79.912A HIP INJURY, LEFT, INITIAL ENCOUNTER: Primary | ICD-10-CM

## 2024-07-22 DIAGNOSIS — R52 PAIN: ICD-10-CM

## 2024-08-08 ENCOUNTER — TREATMENT (OUTPATIENT)
Dept: PHYSICAL THERAPY | Facility: CLINIC | Age: 74
End: 2024-08-08
Payer: MEDICARE

## 2024-08-08 DIAGNOSIS — S76.012D TEAR OF LEFT GLUTEUS MEDIUS TENDON, SUBSEQUENT ENCOUNTER: ICD-10-CM

## 2024-08-08 DIAGNOSIS — M67.952 TENDINOPATHY OF LEFT GLUTEUS MEDIUS: ICD-10-CM

## 2024-08-08 DIAGNOSIS — Z74.09 IMPAIRED FUNCTIONAL MOBILITY AND ACTIVITY TOLERANCE: ICD-10-CM

## 2024-08-08 DIAGNOSIS — M70.62 TROCHANTERIC BURSITIS OF BOTH HIPS: ICD-10-CM

## 2024-08-08 DIAGNOSIS — M70.61 TROCHANTERIC BURSITIS OF BOTH HIPS: ICD-10-CM

## 2024-08-08 DIAGNOSIS — M25.552 PAIN OF LEFT HIP: Primary | ICD-10-CM

## 2024-08-08 NOTE — PROGRESS NOTES
Physical Therapy Initial Evaluation and Plan of Care      Patient: Hellen Delgado   : 1950  Diagnosis/ICD-10 Code:  Pain of left hip [M25.552]  Referring practitioner: KLEVER Pozo  Date of Initial Visit: 2024  Today's Date: 2024  Patient seen for 1 sessions    Visit Diagnoses:    ICD-10-CM ICD-9-CM   1. Pain of left hip  M25.552 719.45   2. Tendinopathy of left gluteus medius  M67.952 727.9   3. Tear of left gluteus medius tendon, subsequent encounter  S76.012D V58.89     843.8   4. Trochanteric bursitis of both hips  M70.61 726.5    M70.62    5. Impaired functional mobility and activity tolerance  Z74.09 V49.89       [facility] [place] [address]  [phone] (phone)  [phone] (fax)         Subjective Evaluation    History of Present Illness  Date of onset: 5/10/2024  Mechanism of injury: Hellen was in her back yard and the heel of her shoe caught, causing her to lose her balance, twist the L LE and fall. She did not fall on her knee (L TKA). She initial had severe pain, but didn't go to the ER or anything, thought just pulled/strained muscles. Her granddaughter was here visiting for 3 weeks. They went on a lot of trips, having difficulty walking. She had an MRI showing tendonitis of B glute med and min tendons and a L mild partial thickness glute med tear.     PMHx: lumbar discectomy x2, L TKA      MRI L hip:   1. Moderate left gluteus minimus and anterior gluteus medius   tendinopathy with mild partial-thickness tear at the greater trochanter.   2. Mild to moderate right gluteus minimus and anterior gluteus medius   tendinopathy.   3. Minimal bilateral greater trochanter bursitis.   4. 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         Patient Occupation: retired Quality of life: good    Pain  Current pain ratin  At best pain rating: 3  At worst pain ratin  Location: L hip,  some R hip soreness  Quality: sharp and discomfort  Relieving factors: medications (Tylenol)  Aggravating factors: prolonged positioning, sleeping, stairs and ambulation (getting up from chair after prolonged sitting, getting in/out of the car)  Progression: improved    Social Support  Lives in: one-story house  Lives with: spouse    Diagnostic Tests  MRI studies: abnormal    Patient Goals  Patient goals for therapy: decreased pain and increased strength  Patient goal: sleeping on her side, stairs without pain           Objective          Palpation   Left   Hypertonic in the gluteus medius and piriformis.   Tenderness of the gluteus medius, piriformis and vastus lateralis.   Trigger point to gluteus medius and piriformis.     Right   Hypertonic in the gluteus medius and piriformis. Tenderness of the gluteus medius and piriformis.     Tenderness     Left Hip   Tenderness in the greater trochanter and sacroiliac joint.     Right Hip   Tenderness in the greater trochanter.   Left Knee   Tenderness in the ITB.     Neurological Testing     Sensation     Lumbar   Left   Intact: light touch    Right   Intact: light touch    Strength/Myotome Testing     Left Hip   Planes of Motion   Flexion: 4  Abduction: 4- (pain)  Adduction: 4+  External rotation: 4- (painful)    Right Hip   Planes of Motion   Flexion: 4+  Abduction: 4+  Adduction: 5  External rotation: 4+    Left Knee   Flexion: 5  Extension: 5    Left Ankle/Foot   Dorsiflexion: 5    Tests     Left Hip   Positive MAYE.   Negative FADIR.   SLR: Positive.     Right Hip   Negative MAYE and FADIR.   SLR: Negative.     Left Hip Flexibility Comments:   Mild tightness of hamstrings, moderate tightness of hip rotators    Right Hip Flexibility Comments:   Mild tightness of hamstrings and hip rotators      Ambulation     Comments   Very mild trunk shift over the L LE in stance phase    Functional Assessment     Single Leg Stance   Left: 4 seconds  Right: 6 seconds        Manual:    Pt in R SL with pillow between knees. STM to L gluteals with sacral and ITBand release, in supine with knees over a bolster ITBand release with  hand tool    Exercises:  -SKTC 3x20 sec  -piriformis/ITBand stretch (opposite leg extended) 3x20 sec  -hamstring 90/90 3x20 sec   Discussed using cane when out in the community to help with gait pattern and to possibly reduce pain    Functional Outcome Score:   LEFS=48/80 or 60%        Assessment & Plan       Assessment  Impairments: abnormal gait, abnormal or restricted ROM, activity intolerance, impaired balance, impaired physical strength, lacks appropriate home exercise program and pain with function   Functional limitations: sleeping, walking, uncomfortable because of pain, moving in bed, sitting and standing   Assessment details: Hellen Delgado is a 73 y.o. year-old female referred to physical therapy for left hip pain. She presents with a evolving clinical presentation.  She has comorbidities partial thickness tear of the L gluteus medius, tendinopathy of B glute med and min,  and no personal factors that may affect her progress in the plan of care.  Pt has mildly altered gait, decreased strength 4-/5, and decreased flexibility of the hip rotators. Pt would benefit from therapy to help improve her ability to walk, transfer, sleep, and perform stairs without increased pain.   Prognosis: good    Goals  Plan Goals: ST wks  1. Patient will be independent with education for symptom management, joint protection and strategies to minimize stress on affected tissues  2. Patient will be able to lay on her L side for 1 hour during sleep without waking from pain  3. Pt to have no more than 3/10 pain in the L hip/buttocks when transferring from sit to stand    LT wks  1. Pt to be able to sleep on her L side for 3 hours without increased pain   2. Pt to improve score on LEFS from 60% to 80% for overall functional improvement  3. Patient will increase L hip  strength to 4+/5 to improve functional mobility with transfers  4. Patient will negotiate stairs reciprocally without rail support for greater ease in walking into her back door without assist from her   5. Patient will demonstrate an independent HEP for core and hip strength and flexibility/ROM.        Plan  Therapy options: will be seen for skilled therapy services  Planned modality interventions: cryotherapy, ultrasound, TENS, thermotherapy (hydrocollator packs), dry needling and hydrotherapy  Other planned modality interventions: aquatic therapy  Planned therapy interventions: ADL retraining, balance/weight-bearing training, flexibility, functional ROM exercises, gait training, home exercise program, IADL retraining, joint mobilization, manual therapy, motor coordination training, neuromuscular re-education, soft tissue mobilization, strengthening, stretching, therapeutic activities, transfer training, abdominal trunk stabilization and postural training  Frequency: 2x week  Duration in weeks: 12  Treatment plan discussed with: patient        Timed:   Manual          12     mins  20762;  Therapeutic Exercise:    8     mins  51367;     Neuromuscular John:        mins  09464;    Therapeutic Activity:          mins  40892;     Gait Training:           mins  04029;     Ultrasound:          mins  21882;    Iontophoresis         mins 05938        Untimed:  Electrical Stimulation:         mins  63755 ( );  Traction:       mins  68414;   Dry Needling   (1-2 muscles)             mins 01975 (Self-pay)  Dry Needling (3-4 muscles)           mins 20561 (Self-pay)  Dry Needling Trial              mins DRYNDLTRIAL  (No Charge)    Low Eval          Mins  98214  Mod Eval     25     Mins  72321  High Eval                            Mins  58496    Timed Treatment:   20   mins   Total Treatment:     45   mins    PT SIGNATURE: Sierra Ortega PT, CDNT    License Number: [id number]    Electronically signed by Sierra WALKER  Jordan, PT, 08/08/24, 1:34 PM EDT    DATE TREATMENT INITIATED: 8/8/2024    Initial Certification  Certification Period: 11/6/2024  I certify that the therapy services are furnished while this patient is under my care.  The services outlined above are required by this patient, and will be reviewed every 90 days.     PHYSICIAN: Erica Marmolejo APRN   NPI: [id number]                                         DATE:     Please sign and return via fax to [phone] Thank you, [facility].

## 2024-08-15 ENCOUNTER — TREATMENT (OUTPATIENT)
Dept: PHYSICAL THERAPY | Facility: CLINIC | Age: 74
End: 2024-08-15
Payer: MEDICARE

## 2024-08-15 DIAGNOSIS — M67.952 TENDINOPATHY OF LEFT GLUTEUS MEDIUS: ICD-10-CM

## 2024-08-15 DIAGNOSIS — M25.552 PAIN OF LEFT HIP: Primary | ICD-10-CM

## 2024-08-15 DIAGNOSIS — M70.61 TROCHANTERIC BURSITIS OF BOTH HIPS: ICD-10-CM

## 2024-08-15 DIAGNOSIS — S76.012D TEAR OF LEFT GLUTEUS MEDIUS TENDON, SUBSEQUENT ENCOUNTER: ICD-10-CM

## 2024-08-15 DIAGNOSIS — M70.62 TROCHANTERIC BURSITIS OF BOTH HIPS: ICD-10-CM

## 2024-08-15 DIAGNOSIS — Z74.09 IMPAIRED FUNCTIONAL MOBILITY AND ACTIVITY TOLERANCE: ICD-10-CM

## 2024-08-15 NOTE — PROGRESS NOTES
Physical Therapy Daily Treatment Note    Patient: Hellen Delgado   : 1950  Diagnosis/ICD-10 Code:  Pain of left hip [M25.552]  Referring practitioner: KLEVER Pozo  Date of Initial Visit: Type: THERAPY  Noted: 2024  Today's Date: 8/15/2024  Patient seen for 2 sessions    Visit Diagnoses:    ICD-10-CM ICD-9-CM   1. Pain of left hip  M25.552 719.45   2. Tendinopathy of left gluteus medius  M67.952 727.9   3. Tear of left gluteus medius tendon, subsequent encounter  S76.012D V58.89     843.8   4. Trochanteric bursitis of both hips  M70.61 726.5    M70.62    5. Impaired functional mobility and activity tolerance  Z74.09 V49.89       [facility]  [address] [place] [address]  [phone] (phone)  [phone] (fax)         Subjective I do feel better from the first treatment    Objective     See Modalities flowsheet for complete treatment    Exercises:  -LTR 10x 5 sec  -small bridge 2x10  -SKTC 3x20 sec  -piriformis/ITBand stretch rocking 3x20 sec  -hamstring 90/90 3x20 sec    Manual:   Pt in R SL with pillow between knees. STM to L gluteals with sacral and ITBand release     Assessment/Plan  Pt did well after the initial session with manual therapy with some reduction of pain. She has a hard time with bed mobility and is unable to sleep  on her side. She did have reduced pain levels and tightness after she got up from the treatment table.          Timed:    Manual Therapy:    20     mins  95924;  Therapeutic Exercise:    15     mins  08228;     Neuromuscular John:        mins  98458;    Therapeutic Activity:          mins  34656;     Gait Training:           mins  81701;     Ultrasound:     10     mins  62905;    Electrical Stimulation:         mins  76642 ( );  Iontophoresis         mins 21440;  Aquatic Therapy         mins 43837;      Untimed:  Electrical Stimulation:         mins  03206 ( );  Traction:         mins  98147;   Dry  Needling   (1-2 muscles)             mins 20560 (Self-pay)  Dry Needling (3-4 muscles)           mins 20561 (Self-pay)  Dry Needling Trial              mins DRYNDLTRIAL  (No Charge)    Timed Treatment:   45   mins   Total Treatment:     45   mins    Sierra Ortega PT, CDNT  Physical Therapist    [place] License:[id number]

## 2024-08-21 ENCOUNTER — TREATMENT (OUTPATIENT)
Dept: PHYSICAL THERAPY | Facility: CLINIC | Age: 74
End: 2024-08-21
Payer: MEDICARE

## 2024-08-21 DIAGNOSIS — M70.62 TROCHANTERIC BURSITIS OF BOTH HIPS: ICD-10-CM

## 2024-08-21 DIAGNOSIS — M25.552 PAIN OF LEFT HIP: Primary | ICD-10-CM

## 2024-08-21 DIAGNOSIS — M67.952 TENDINOPATHY OF LEFT GLUTEUS MEDIUS: ICD-10-CM

## 2024-08-21 DIAGNOSIS — Z74.09 IMPAIRED FUNCTIONAL MOBILITY AND ACTIVITY TOLERANCE: ICD-10-CM

## 2024-08-21 DIAGNOSIS — S76.012D TEAR OF LEFT GLUTEUS MEDIUS TENDON, SUBSEQUENT ENCOUNTER: ICD-10-CM

## 2024-08-21 DIAGNOSIS — M70.61 TROCHANTERIC BURSITIS OF BOTH HIPS: ICD-10-CM

## 2024-08-21 NOTE — PROGRESS NOTES
Physical Therapy Daily Treatment Note    Patient: Hellen Delgado   : 1950  Diagnosis/ICD-10 Code:  Pain of left hip [M25.552]  Referring practitioner: KLEVER Pozo  Date of Initial Visit: Type: THERAPY  Noted: 2024  Today's Date: 2024  Patient seen for 3 sessions    Visit Diagnoses:    ICD-10-CM ICD-9-CM   1. Pain of left hip  M25.552 719.45   2. Tendinopathy of left gluteus medius  M67.952 727.9   3. Tear of left gluteus medius tendon, subsequent encounter  S76.012D V58.89     843.8   4. Trochanteric bursitis of both hips  M70.61 726.5    M70.62    5. Impaired functional mobility and activity tolerance  Z74.09 V49.89       [facility]  [address] [place] [address]  [phone] (phone)  [phone] (fax)         Subjective  I felt great the day after the last treatment and it lasted for about 3 days. Did [place] the fair yesterday     Objective     See Modalities flowsheet for complete treatment     Exercises:  -LTR 10x 5 sec  -small bridge 2x10  -hip abd/ER red band, x20  -SKTC 3x20 sec  -piriformis/ITBand stretch rocking 3x20 sec  -hamstring 90/90 3x20 sec     Manual:   Pt in R SL with pillow between knees. STM to L gluteals with sacral and ITBand release using light to medium pressure       Assessment/Plan  Pt had some initial soreness following the last treatment, but then got about 3 days of reduced pain. Her pain is 4/10 today after walking at the fair this week. Starting to add hip ER/abd gentle strengthening         Timed:    Manual Therapy:    12     mins  63034;  Therapeutic Exercise:    20     mins  46575;     Neuromuscular John:        mins  60936;    Therapeutic Activity:          mins  97382;     Gait Training:           mins  12800;     Ultrasound:     10     mins  56483;    Electrical Stimulation:         mins  83379 ( );  Iontophoresis         mins 72015;  Aquatic Therapy         mins  76780;      Untimed:  Electrical Stimulation:         mins  88282 ( );  Traction:         mins  76253;   Dry Needling   (1-2 muscles)             mins 20560 (Self-pay)  Dry Needling (3-4 muscles)           mins 20561 (Self-pay)  Dry Needling Trial              mins DRYNDLTRIAL  (No Charge)    Timed Treatment:   42   mins   Total Treatment:     42   mins    Sierra Ortega PT, CDNT  Physical Therapist    [place] License:[id number]

## 2024-08-30 ENCOUNTER — TREATMENT (OUTPATIENT)
Dept: PHYSICAL THERAPY | Facility: CLINIC | Age: 74
End: 2024-08-30
Payer: MEDICARE

## 2024-08-30 DIAGNOSIS — M70.62 TROCHANTERIC BURSITIS OF BOTH HIPS: ICD-10-CM

## 2024-08-30 DIAGNOSIS — M25.552 PAIN OF LEFT HIP: Primary | ICD-10-CM

## 2024-08-30 DIAGNOSIS — S76.012D TEAR OF LEFT GLUTEUS MEDIUS TENDON, SUBSEQUENT ENCOUNTER: ICD-10-CM

## 2024-08-30 DIAGNOSIS — M70.61 TROCHANTERIC BURSITIS OF BOTH HIPS: ICD-10-CM

## 2024-08-30 DIAGNOSIS — M67.952 TENDINOPATHY OF LEFT GLUTEUS MEDIUS: ICD-10-CM

## 2024-08-30 DIAGNOSIS — Z74.09 IMPAIRED FUNCTIONAL MOBILITY AND ACTIVITY TOLERANCE: ICD-10-CM

## 2024-08-30 NOTE — PROGRESS NOTES
Physical Therapy Daily Treatment Note    Patient: Hellen Delgado   : 1950  Diagnosis/ICD-10 Code:  Pain of left hip [M25.552]  Referring practitioner: KLEVER Pozo  Date of Initial Visit: Type: THERAPY  Noted: 2024  Today's Date: 2024  Patient seen for 4 sessions    Visit Diagnoses:    ICD-10-CM ICD-9-CM   1. Pain of left hip  M25.552 719.45   2. Tendinopathy of left gluteus medius  M67.952 727.9   3. Tear of left gluteus medius tendon, subsequent encounter  S76.012D V58.89     843.8   4. Trochanteric bursitis of both hips  M70.61 726.5    M70.62    5. Impaired functional mobility and activity tolerance  Z74.09 V49.89       [facility]  [address] [place] [address]  [phone] (phone)  [phone] (fax)         Subjective it has been feeling about the same,going lighter on the massage really didn't change much    Objective     See Modalities flowsheet for complete treatment     Exercises:  -LTR 10x 5 sec  -small bridge 2x10  DEFER  -hip abd/ER red band, x20  -SKTC 3x20 sec  -piriformis/ITBand stretch rocking 3x20 sec  -hamstring 90/90 3x20 sec     Manual:   Pt in R SL with pillow between knees. STM to L gluteals with sacral and ITBand release using medium pressure , ischemic pressure for trigger points    Discussed dry needling with patient and , including theories of treatment, benefits, risks.     Assessment/Plan  Pt continues with L>R hip pain. She is still unable to sleep in her bed (they are currently looking for new mattresses) and has been sleeping in the recliner. She is unable to lay on either side for very long. Plan to do a trial of dry needling next session         Timed:    Manual Therapy:    20     mins  41553;  Therapeutic Exercise:    15     mins  63267;     Neuromuscular John:        mins  32388;    Therapeutic Activity:          mins  48866;     Gait Training:           mins  75347;     Ultrasound:     10      mins  52080;    Electrical Stimulation:         mins  94630 ( );  Iontophoresis         mins 84460;  Aquatic Therapy        mins 75379;      Untimed:  Electrical Stimulation:         mins  98176 ( );  Traction:         mins  77174;   Dry Needling   (1-2 muscles)             mins 20560 (Self-pay)  Dry Needling (3-4 muscles)           mins 20561 (Self-pay)  Dry Needling Trial              mins DRYNDLTRIAL  (No Charge)    Timed Treatment:   45   mins   Total Treatment:     45   mins    Sierra Ortega PT, CDNT  Physical Therapist    [place] License:[id number]

## 2024-09-04 ENCOUNTER — TREATMENT (OUTPATIENT)
Dept: PHYSICAL THERAPY | Facility: CLINIC | Age: 74
End: 2024-09-04
Payer: MEDICARE

## 2024-09-04 DIAGNOSIS — Z74.09 IMPAIRED FUNCTIONAL MOBILITY AND ACTIVITY TOLERANCE: ICD-10-CM

## 2024-09-04 DIAGNOSIS — M70.62 TROCHANTERIC BURSITIS OF BOTH HIPS: ICD-10-CM

## 2024-09-04 DIAGNOSIS — M70.61 TROCHANTERIC BURSITIS OF BOTH HIPS: ICD-10-CM

## 2024-09-04 DIAGNOSIS — M67.952 TENDINOPATHY OF LEFT GLUTEUS MEDIUS: ICD-10-CM

## 2024-09-04 DIAGNOSIS — S76.012D TEAR OF LEFT GLUTEUS MEDIUS TENDON, SUBSEQUENT ENCOUNTER: ICD-10-CM

## 2024-09-04 DIAGNOSIS — M25.552 PAIN OF LEFT HIP: Primary | ICD-10-CM

## 2024-09-04 NOTE — PROGRESS NOTES
Physical Therapy Daily Treatment Note    Patient: Hellen Delgado   : 1950  Diagnosis/ICD-10 Code:  Pain of left hip [M25.552]  Referring practitioner: KLEVER Pozo  Date of Initial Visit: Type: THERAPY  Noted: 2024  Today's Date: 2024  Patient seen for 5 sessions    Visit Diagnoses:    ICD-10-CM ICD-9-CM   1. Pain of left hip  M25.552 719.45   2. Tendinopathy of left gluteus medius  M67.952 727.9   3. Tear of left gluteus medius tendon, subsequent encounter  S76.012D V58.89     843.8   4. Trochanteric bursitis of both hips  M70.61 726.5    M70.62    5. Impaired functional mobility and activity tolerance  Z74.09 V49.89       [facility]  [address] [place] [address]  [phone] (phone)  [phone] (fax)         Subjective it is feeling better, some tenderness on the R side as well though    Objective     See Modalities flowsheet for complete treatment     Exercises:  -K leg press, 100lb 2x15  -K single leg press 80lb, x15  -K hip abd, 30lb 2x10  -fwd step ups, lowest level of the K hip platform, 2x5 each     Manual:   Pt in R SL with pillow between knees. STM to L gluteals with sacral and ITBand release using medium pressure , ischemic pressure for trigger points      Assessment/Plan  Started working on some [facility] activities this session. She did have a little pain on the L LE with the step up initially. She continues with tenderness of the L glute med, glute min, piriformis, and GT bursa, muscle pain reduced post treatment          Timed:    Manual Therapy:    15     mins  35692;  Therapeutic Exercise:    20     mins  15275;     Neuromuscular John:        mins  33637;    Therapeutic Activity:          mins  97226;     Gait Training:           mins  83903;     Ultrasound:     10     mins  81004;    Electrical Stimulation:         mins  76294 ( );  Iontophoresis         mins 67279;  Aquatic Therapy         mins  76510;      Untimed:  Electrical Stimulation:         mins  45684 ( );  Traction:         mins  71419;   Dry Needling   (1-2 muscles)             mins 20560 (Self-pay)  Dry Needling (3-4 muscles)           mins 20561 (Self-pay)  Dry Needling Trial              mins DRYNDLTRIAL  (No Charge)    Timed Treatment:   45   mins   Total Treatment:     45   mins    Sierra Ortega PT, CDNT  Physical Therapist    [place] License:[id number]

## 2024-09-06 ENCOUNTER — TREATMENT (OUTPATIENT)
Dept: PHYSICAL THERAPY | Facility: CLINIC | Age: 74
End: 2024-09-06
Payer: MEDICARE

## 2024-09-06 DIAGNOSIS — S76.012D TEAR OF LEFT GLUTEUS MEDIUS TENDON, SUBSEQUENT ENCOUNTER: ICD-10-CM

## 2024-09-06 DIAGNOSIS — M67.952 TENDINOPATHY OF LEFT GLUTEUS MEDIUS: ICD-10-CM

## 2024-09-06 DIAGNOSIS — Z74.09 IMPAIRED FUNCTIONAL MOBILITY AND ACTIVITY TOLERANCE: ICD-10-CM

## 2024-09-06 DIAGNOSIS — M70.61 TROCHANTERIC BURSITIS OF BOTH HIPS: ICD-10-CM

## 2024-09-06 DIAGNOSIS — M70.62 TROCHANTERIC BURSITIS OF BOTH HIPS: ICD-10-CM

## 2024-09-06 DIAGNOSIS — M25.552 PAIN OF LEFT HIP: Primary | ICD-10-CM

## 2024-09-06 NOTE — PROGRESS NOTES
30-Day / 10-Visit Progress Note         Patient: Hellen Delgado   : 1950  Diagnosis/ICD-10 Code:  Pain of left hip [M25.552]  Referring practitioner: KLEVER Pozo  Date of Initial Visit: Type: THERAPY  Noted: 2024  Today's Date: 2024  Patient seen for 6 sessions    Visit Diagnoses:    ICD-10-CM ICD-9-CM   1. Pain of left hip  M25.552 719.45   2. Tendinopathy of left gluteus medius  M67.952 727.9   3. Tear of left gluteus medius tendon, subsequent encounter  S76.012D V58.89     843.8   4. Trochanteric bursitis of both hips  M70.61 726.5    M70.62    5. Impaired functional mobility and activity tolerance  Z74.09 V49.89       [facility] [place] [address]  [phone] (phone)  [phone] (fax)    Subjective:     Clinical Progress: improved  Home Program Compliance: Yes  Treatment has included:  therapeutic exercise, manual therapy, therapeutic activity, neuro-muscular retraining , ultrasound, and patient education with home exercise program     Subjective Pain overall is better, still having issues with stairs, getting out of the car, and sleeping in her side  Objective          Palpation   Left   Hypertonic in the gluteus medius and piriformis.   Tenderness of the gluteus medius and piriformis.   Trigger point to gluteus medius and piriformis.     Right   Hypertonic in the gluteus medius and piriformis. Tenderness of the gluteus medius and piriformis.     Tenderness     Left Hip   Tenderness in the greater trochanter.     Right Hip   Tenderness in the greater trochanter.     Strength/Myotome Testing     Left Hip   Planes of Motion   Flexion: 4+  Abduction: 4- (painful)  Adduction: 4+  External rotation: 4 (painful)    Right Hip   Planes of Motion   Flexion: 5  Abduction: 4 (some pain)  Adduction: 5  External rotation: 4+        See Modalities flowsheet for complete treatment     Exercises:  -K leg press, 100lb 2x15  -K  single leg press 80lb, x15  -K hip abd, 30lb 2x10  -fwd step ups on R , lowest level of the K hip platform, 2x5     Manual:   Pt in R SL with pillow between knees. STM to L gluteals with sacral and ITBand release using medium pressure , ischemic pressure for trigger points    Functional Outcome Score:   LEFS=52/80 or 65%    Assessment & Plan       Assessment  Assessment details: Hellen Delgado has been seen for 6 physical therapy sessions for B hip pain with L gluteus medius tear .  Treatment has included therapeutic exercise, manual therapy, therapeutic activity, neuro-muscular retraining , ultrasound, and patient education with home exercise program . Progress to physical therapy goals is good. She has improved her pain complaints with walking, ADLs, but continues with difficulty with sleeping (unable to lay on her side for more than a few minutes), stairs (immediate pain in L hip/buttocks), and getting out of her car. She has improved her strength, but continues with weakness in L hip Abd with pain.   She will benefit from continued skilled physical therapy to address remaining impairments and functional limitations.     Prognosis: good    Goals  Plan Goals: ST wks  1. Patient will be independent with education for symptom management, joint protection and strategies to minimize stress on affected tissues (PART MET)   2. Patient will be able to lay on her L side for 1 hour during sleep without waking from pain (ONGOING)  has been able to sleep for small amounts on her side  3. Pt to have no more than 3/10 pain in the L hip/buttocks when transferring from sit to stand (MET)      LT wks  1. Pt to be able to sleep on her L side for 3 hours without increased pain  (ONGOING)   2. Pt to improve score on LEFS from 60% to 80% for overall functional improvement (ONGOING) improved to 65%  3. Patient will increase L hip strength to 4+/5 to improve functional mobility with transfers (ONGOING)   4. Patient will  negotiate stairs reciprocally without rail support for greater ease in walking into her back door without assist from her  (ONGOING)   5. Patient will demonstrate an independent HEP for core and hip strength and flexibility/ROM. (ONGOING)       Plan  Therapy options: will be seen for skilled therapy services  Frequency: 2x week  Duration in weeks: 8  Treatment plan discussed with: patient           Recommendations: Continue as planned  Timeframe: 2 months  Prognosis to achieve goals: good    PT Signature: Sierra Ortega PT, CDNT    License Number: [id number]    Electronically signed by Sierra Ortega PT, 09/06/24, 3:03 PM EDT      Based upon review of the patient's progress and continued therapy plan, it is my medical opinion that Hellen Delgado should continue physical therapy treatment at [facility] PH[facility] TH[facility]AP[facility]  [address] [place] [address] [address] [place] [address]  [phone].    Signature: __________________________________  Erica Marmolejo APRN    Timed:  Manual Therapy:    15     mins  08837;  Therapeutic Exercise:    20     mins  54020;     Neuromuscular John:        mins  62017;    Therapeutic Activity:          mins  18193;     Gait Training:           mins  85897;     Ultrasound:    10      mins  46287;    Iontophoresis         mins 19099;    Untimed:  Electrical Stimulation:         mins  09369 ( );  Traction:         mins  62814;   Dry Needling   (1-2 muscles)            mins 52740 (Self-pay)  Dry Needling (3-4 muscles)             mins 20561 (Self-pay)  Dry Needling Trial                 mins DRYNDLTRIAL  (No Charge)    Timed Treatment:   45   mins   Total Treatment:     45   mins

## 2024-09-10 ENCOUNTER — TREATMENT (OUTPATIENT)
Dept: PHYSICAL THERAPY | Facility: CLINIC | Age: 74
End: 2024-09-10
Payer: MEDICARE

## 2024-09-10 DIAGNOSIS — M25.552 PAIN OF LEFT HIP: Primary | ICD-10-CM

## 2024-09-10 DIAGNOSIS — M67.952 TENDINOPATHY OF LEFT GLUTEUS MEDIUS: ICD-10-CM

## 2024-09-10 DIAGNOSIS — S76.012D TEAR OF LEFT GLUTEUS MEDIUS TENDON, SUBSEQUENT ENCOUNTER: ICD-10-CM

## 2024-09-10 DIAGNOSIS — M70.61 TROCHANTERIC BURSITIS OF BOTH HIPS: ICD-10-CM

## 2024-09-10 DIAGNOSIS — Z74.09 IMPAIRED FUNCTIONAL MOBILITY AND ACTIVITY TOLERANCE: ICD-10-CM

## 2024-09-10 DIAGNOSIS — M70.62 TROCHANTERIC BURSITIS OF BOTH HIPS: ICD-10-CM

## 2024-09-10 PROCEDURE — 97140 MANUAL THERAPY 1/> REGIONS: CPT | Performed by: PHYSICAL THERAPIST

## 2024-09-10 PROCEDURE — 97035 APP MDLTY 1+ULTRASOUND EA 15: CPT | Performed by: PHYSICAL THERAPIST

## 2024-09-10 PROCEDURE — DRYNDLTRIAL DRY NEEDLING TRIAL: Performed by: PHYSICAL THERAPIST

## 2024-09-10 NOTE — PROGRESS NOTES
Physical Therapy Daily Treatment Note    Patient: Hellen Delgado   : 1950  Diagnosis/ICD-10 Code:  Pain of left hip [M25.552]  Referring practitioner: KLEVER Pozo  Date of Initial Visit: Type: THERAPY  Noted: 2024  Today's Date: 9/10/2024  Patient seen for 7 sessions    Visit Diagnoses:    ICD-10-CM ICD-9-CM   1. Pain of left hip  M25.552 719.45   2. Tendinopathy of left gluteus medius  M67.952 727.9   3. Tear of left gluteus medius tendon, subsequent encounter  S76.012D V58.89     843.8   4. Trochanteric bursitis of both hips  M70.61 726.5    M70.62    5. Impaired functional mobility and activity tolerance  Z74.09 V49.89       [facility]  [address] [place] [address]  [phone] (phone)  [phone] (fax)         Subjective I felt better after the last session, but over the weekend I tried my [employer] bike peddler and I was really hurting after that. Didn't even do it 10 min    Objective     See Modalities flowsheet for complete treatment     Dry Needling Trial:  Dry needling, using threading and direct techniques, obtaining written and verbal consent to treat after discussing benefits and risks.   Patient position during treatment: R SL, pillow between legs. Muscles treated: L piriformis and glute med (used 6 (0.30) x75mm needles). Response: LTRs.Clean needle technique observed at all times, precautions for lung fields, neurovascular structures observed. Manual palpation and assessment performed before, during, and after session.       Manual:   Pt in R SL with pillow between knees. STM to L gluteals with sacral and ITBand release using medium pressure , ischemic pressure for trigger points       Assessment/Plan  Pt was having increased pain over the weekend after doing a seated peddler device at home. She has increased tenderness over the glute med and piriformis. We had discussed dry needling at previous sessions and decided to do a  trial today. She tolerated it well and after the session, she had decreased pain leaving therapy. She was advised to increase hydration, ice, and lightly stretch to help with soreness over the next 24 hours.          Timed:    Manual Therapy:    20     mins  80099;  Therapeutic Exercise:         mins  64075;     Neuromuscular John:        mins  58243;    Therapeutic Activity:          mins  33881;     Gait Training:           mins  08071;     Ultrasound:     10     mins  14317;    Electrical Stimulation:         mins  80632 ( );  Iontophoresis         mins 21196;  Aquatic Therapy         mins 29469;      Untimed:  Electrical Stimulation:         mins  99521 ( );  Traction:         mins  80933;   Dry Needling   (1-2 muscles)             mins 20560 (Self-pay)  Dry Needling (3-4 muscles)           mins 20561 (Self-pay)  Dry Needling Trial    13          mins DRYNDLTRIAL  (No Charge)    Timed Treatment:   30   mins   Total Treatment:     43   mins    Sierra Ortega PT, CDNT  Physical Therapist    [place] License:[id number]

## 2024-09-13 ENCOUNTER — TREATMENT (OUTPATIENT)
Dept: PHYSICAL THERAPY | Facility: CLINIC | Age: 74
End: 2024-09-13
Payer: MEDICARE

## 2024-09-13 DIAGNOSIS — M67.952 TENDINOPATHY OF LEFT GLUTEUS MEDIUS: ICD-10-CM

## 2024-09-13 DIAGNOSIS — M70.61 TROCHANTERIC BURSITIS OF BOTH HIPS: ICD-10-CM

## 2024-09-13 DIAGNOSIS — Z74.09 IMPAIRED FUNCTIONAL MOBILITY AND ACTIVITY TOLERANCE: ICD-10-CM

## 2024-09-13 DIAGNOSIS — M25.552 PAIN OF LEFT HIP: Primary | ICD-10-CM

## 2024-09-13 DIAGNOSIS — M70.62 TROCHANTERIC BURSITIS OF BOTH HIPS: ICD-10-CM

## 2024-09-13 DIAGNOSIS — S76.012D TEAR OF LEFT GLUTEUS MEDIUS TENDON, SUBSEQUENT ENCOUNTER: ICD-10-CM

## 2024-09-13 NOTE — PROGRESS NOTES
Physical Therapy Daily Treatment Note    Patient: Hellen Delgado   : 1950  Diagnosis/ICD-10 Code:  Pain of left hip [M25.552]  Referring practitioner: KLEVER Pozo  Date of Initial Visit: Type: THERAPY  Noted: 2024  Today's Date: 2024  Patient seen for 8 sessions    Visit Diagnoses:    ICD-10-CM ICD-9-CM   1. Pain of left hip  M25.552 719.45   2. Tendinopathy of left gluteus medius  M67.952 727.9   3. Tear of left gluteus medius tendon, subsequent encounter  S76.012D V58.89     843.8   4. Trochanteric bursitis of both hips  M70.61 726.5    M70.62    5. Impaired functional mobility and activity tolerance  Z74.09 V49.89       [facility]  [address] [place] [address]  [phone] (phone)  [phone] (fax)         Subjectivethe dry needling really did help the pain. I have been able to lay on the L side for a bit while in the bed    Objective     See Modalities flowsheet for complete treatment     Exercises:  -LTR 10x 5 sec  -small bridge 2x10   -hip abd/ER green band, x20  -SKTC 3x20 sec  -piriformis/ITBand stretch rocking 3x20 sec  -hamstring 90/90 3x20 sec     Manual:   Pt in R SL with pillow between knees. STM to L gluteals with sacral and ITBand release using medium pressure , ischemic pressure for trigger points       Assessment/Plan  Pt's pain levels are coming down following the initial session of dry needling. She is walking with improved gait pattern, decreased trunk shift over the L LE in stance phase. Plan to do another dry needling treatment next session          Timed:    Manual Therapy:    20     mins  97451;  Therapeutic Exercise:    15    mins  44527;     Neuromuscular John:        mins  48375;    Therapeutic Activity:          mins  41255;     Gait Training:           mins  42486;     Ultrasound:     10     mins  02429;    Electrical Stimulation:         mins  84332 ( );  Iontophoresis         mins  52638;  Aquatic Therapy         mins 57960;      Untimed:  Electrical Stimulation:         mins  38376 ( );  Traction:         mins  15925;   Dry Needling   (1-2 muscles)             mins 20560 (Self-pay)  Dry Needling (3-4 muscles)           mins 20561 (Self-pay)  Dry Needling Trial              mins DRYNDLTRIAL  (No Charge)    Timed Treatment:   45   mins   Total Treatment:     45   mins    Sierra Ortega PT, CDNT  Physical Therapist    [place] License:[id number]

## 2024-09-17 ENCOUNTER — TREATMENT (OUTPATIENT)
Dept: PHYSICAL THERAPY | Facility: CLINIC | Age: 74
End: 2024-09-17
Payer: MEDICARE

## 2024-09-17 DIAGNOSIS — Z74.09 IMPAIRED FUNCTIONAL MOBILITY AND ACTIVITY TOLERANCE: ICD-10-CM

## 2024-09-17 DIAGNOSIS — M70.62 TROCHANTERIC BURSITIS OF BOTH HIPS: ICD-10-CM

## 2024-09-17 DIAGNOSIS — M70.61 TROCHANTERIC BURSITIS OF BOTH HIPS: ICD-10-CM

## 2024-09-17 DIAGNOSIS — M67.952 TENDINOPATHY OF LEFT GLUTEUS MEDIUS: ICD-10-CM

## 2024-09-17 DIAGNOSIS — S76.012D TEAR OF LEFT GLUTEUS MEDIUS TENDON, SUBSEQUENT ENCOUNTER: ICD-10-CM

## 2024-09-17 DIAGNOSIS — M25.552 PAIN OF LEFT HIP: Primary | ICD-10-CM

## 2024-09-20 ENCOUNTER — TREATMENT (OUTPATIENT)
Dept: PHYSICAL THERAPY | Facility: CLINIC | Age: 74
End: 2024-09-20
Payer: MEDICARE

## 2024-09-20 DIAGNOSIS — M70.62 TROCHANTERIC BURSITIS OF BOTH HIPS: ICD-10-CM

## 2024-09-20 DIAGNOSIS — S76.012D TEAR OF LEFT GLUTEUS MEDIUS TENDON, SUBSEQUENT ENCOUNTER: ICD-10-CM

## 2024-09-20 DIAGNOSIS — M67.952 TENDINOPATHY OF LEFT GLUTEUS MEDIUS: ICD-10-CM

## 2024-09-20 DIAGNOSIS — M25.552 PAIN OF LEFT HIP: Primary | ICD-10-CM

## 2024-09-20 DIAGNOSIS — Z74.09 IMPAIRED FUNCTIONAL MOBILITY AND ACTIVITY TOLERANCE: ICD-10-CM

## 2024-09-20 DIAGNOSIS — M70.61 TROCHANTERIC BURSITIS OF BOTH HIPS: ICD-10-CM

## 2024-09-24 ENCOUNTER — TREATMENT (OUTPATIENT)
Dept: PHYSICAL THERAPY | Facility: CLINIC | Age: 74
End: 2024-09-24
Payer: MEDICARE

## 2024-09-24 DIAGNOSIS — M70.61 TROCHANTERIC BURSITIS OF BOTH HIPS: ICD-10-CM

## 2024-09-24 DIAGNOSIS — S76.012D TEAR OF LEFT GLUTEUS MEDIUS TENDON, SUBSEQUENT ENCOUNTER: ICD-10-CM

## 2024-09-24 DIAGNOSIS — M25.552 PAIN OF LEFT HIP: Primary | ICD-10-CM

## 2024-09-24 DIAGNOSIS — Z74.09 IMPAIRED FUNCTIONAL MOBILITY AND ACTIVITY TOLERANCE: ICD-10-CM

## 2024-09-24 DIAGNOSIS — M67.952 TENDINOPATHY OF LEFT GLUTEUS MEDIUS: ICD-10-CM

## 2024-09-24 DIAGNOSIS — M70.62 TROCHANTERIC BURSITIS OF BOTH HIPS: ICD-10-CM

## 2024-09-24 PROCEDURE — 97110 THERAPEUTIC EXERCISES: CPT | Performed by: PHYSICAL THERAPIST

## 2024-09-24 PROCEDURE — 97140 MANUAL THERAPY 1/> REGIONS: CPT | Performed by: PHYSICAL THERAPIST

## 2024-09-24 PROCEDURE — 97530 THERAPEUTIC ACTIVITIES: CPT | Performed by: PHYSICAL THERAPIST

## 2024-09-27 ENCOUNTER — TREATMENT (OUTPATIENT)
Dept: PHYSICAL THERAPY | Facility: CLINIC | Age: 74
End: 2024-09-27
Payer: MEDICARE

## 2024-09-27 DIAGNOSIS — S76.012D TEAR OF LEFT GLUTEUS MEDIUS TENDON, SUBSEQUENT ENCOUNTER: ICD-10-CM

## 2024-09-27 DIAGNOSIS — Z74.09 IMPAIRED FUNCTIONAL MOBILITY AND ACTIVITY TOLERANCE: ICD-10-CM

## 2024-09-27 DIAGNOSIS — M70.62 TROCHANTERIC BURSITIS OF BOTH HIPS: ICD-10-CM

## 2024-09-27 DIAGNOSIS — M25.552 PAIN OF LEFT HIP: Primary | ICD-10-CM

## 2024-09-27 DIAGNOSIS — M67.952 TENDINOPATHY OF LEFT GLUTEUS MEDIUS: ICD-10-CM

## 2024-09-27 DIAGNOSIS — M70.61 TROCHANTERIC BURSITIS OF BOTH HIPS: ICD-10-CM

## 2024-10-02 ENCOUNTER — TREATMENT (OUTPATIENT)
Dept: PHYSICAL THERAPY | Facility: CLINIC | Age: 74
End: 2024-10-02
Payer: MEDICARE

## 2024-10-02 DIAGNOSIS — Z74.09 IMPAIRED FUNCTIONAL MOBILITY AND ACTIVITY TOLERANCE: ICD-10-CM

## 2024-10-02 DIAGNOSIS — S76.012D TEAR OF LEFT GLUTEUS MEDIUS TENDON, SUBSEQUENT ENCOUNTER: ICD-10-CM

## 2024-10-02 DIAGNOSIS — M25.552 PAIN OF LEFT HIP: Primary | ICD-10-CM

## 2024-10-02 DIAGNOSIS — M67.952 TENDINOPATHY OF LEFT GLUTEUS MEDIUS: ICD-10-CM

## 2024-10-02 DIAGNOSIS — M70.62 TROCHANTERIC BURSITIS OF BOTH HIPS: ICD-10-CM

## 2024-10-02 DIAGNOSIS — M70.61 TROCHANTERIC BURSITIS OF BOTH HIPS: ICD-10-CM

## 2024-10-02 NOTE — PROGRESS NOTES
Physical Therapy Daily Treatment Note    Patient: Hellen Delgado   : 1950  Diagnosis/ICD-10 Code:  Pain of left hip [M25.552]  Referring practitioner: KLEVER Pozo  Date of Initial Visit: Type: THERAPY  Noted: 2024  Today's Date: 10/2/2024  Patient seen for 13 sessions    Visit Diagnoses:    ICD-10-CM ICD-9-CM   1. Pain of left hip  M25.552 719.45   2. Tendinopathy of left gluteus medius  M67.952 727.9   3. Tear of left gluteus medius tendon, subsequent encounter  S76.012D V58.89     843.8   4. Trochanteric bursitis of both hips  M70.61 726.5    M70.62    5. Impaired functional mobility and activity tolerance  Z74.09 V49.89       [facility]  [address] [place] [address]  [phone] (phone)  [phone] (fax)         Subjective doing well, was sore for a bit after the needling, but I feel so much better. I think that really helped my recovery    Objective     Exercises/Therapeutic activity:  -NuStep, seat 8, level 5 7 min  -K leg press, 110lb 2x20  -K single leg press 100lb, x15  -K hip abd, 45lb 2x15  -fwd step ups on B LE , 3rd lowest level of the K hip platform, 2x10  -sit to stand from mat table x10, slow lowering     Manual:   Pt in R SL with pillow between knees. STM to L gluteals with sacral and ITBand release using medium pressure , ischemic pressure for trigger points .       Assessment/Plan  Pt has greatly improved her pain levels, strength, and tolerance to activity. She was able to step up a 6 inch step without increase pain with hand support. She is walking with a near normal gait pattern         Timed:    Manual Therapy:    15     mins  53615;  Therapeutic Exercise:    22     mins  91904;     Neuromuscular John:        mins  78162;    Therapeutic Activity:     8     mins  10206;     Gait Training:           mins  38440;     Ultrasound:          mins  98748;    Electrical Stimulation:         mins  92827 (  );  Iontophoresis         mins 56248;  Aquatic Therapy         mins 76933;      Untimed:  Electrical Stimulation:         mins  27414 ( );  Traction:         mins  18123;   Dry Needling   (1-2 muscles)             mins 20560 (Self-pay)  Dry Needling (3-4 muscles)           mins 20561 (Self-pay)  Dry Needling Trial              mins DRYNDLTRIAL  (No Charge)    Timed Treatment:   45   mins   Total Treatment:     45   mins    Sierra Ortega PT, CDNT  Physical Therapist    [place] License:[id number]

## 2024-10-04 ENCOUNTER — TREATMENT (OUTPATIENT)
Dept: PHYSICAL THERAPY | Facility: CLINIC | Age: 74
End: 2024-10-04
Payer: MEDICARE

## 2024-10-04 DIAGNOSIS — M25.552 PAIN OF LEFT HIP: Primary | ICD-10-CM

## 2024-10-04 DIAGNOSIS — Z74.09 IMPAIRED FUNCTIONAL MOBILITY AND ACTIVITY TOLERANCE: ICD-10-CM

## 2024-10-04 DIAGNOSIS — M70.62 TROCHANTERIC BURSITIS OF BOTH HIPS: ICD-10-CM

## 2024-10-04 DIAGNOSIS — S76.012D TEAR OF LEFT GLUTEUS MEDIUS TENDON, SUBSEQUENT ENCOUNTER: ICD-10-CM

## 2024-10-04 DIAGNOSIS — M67.952 TENDINOPATHY OF LEFT GLUTEUS MEDIUS: ICD-10-CM

## 2024-10-04 DIAGNOSIS — M70.61 TROCHANTERIC BURSITIS OF BOTH HIPS: ICD-10-CM

## 2024-10-04 NOTE — PROGRESS NOTES
60-Day / 10-Visit Progress Note/Discharge Summary         Patient: Hellen Delgado   : 1950  Diagnosis/ICD-10 Code:  Pain of left hip [M25.552]  Referring practitioner: KLEVER Pozo  Date of Initial Visit: Type: THERAPY  Noted: 2024  Today's Date: 10/4/2024  Patient seen for 14 sessions    Visit Diagnoses:    ICD-10-CM ICD-9-CM   1. Pain of left hip  M25.552 719.45   2. Tendinopathy of left gluteus medius  M67.952 727.9   3. Tear of left gluteus medius tendon, subsequent encounter  S76.012D V58.89     843.8   4. Trochanteric bursitis of both hips  M70.61 726.5    M70.62    5. Impaired functional mobility and activity tolerance  Z74.09 V49.89       [facility]  [address] [place] [address]  [phone] (phone)  [phone] (fax)    Subjective:     Clinical Progress: improved  Home Program Compliance: Yes  Treatment has included:  therapeutic exercise, manual therapy, therapeutic activity, neuro-muscular retraining , ultrasound, patient education with home exercise program , and dry needling     Subjective  doing well,no pain  Objective          Strength/Myotome Testing     Left Hip   Planes of Motion   Flexion: 4+  Abduction: 4  Adduction: 4+  External rotation: 4+    Right Hip   Planes of Motion   Flexion: 4+  Abduction: 4+  Adduction: 4+  External rotation: 4+        Exercises/Therapeutic activity:  -TM, 1.4mph, 6min  -M leg press, 45lb 3x15  -M hip abd, 45lb 2x15  -M hip add x15 on 50lb, x15  40lb  -M hamstring curl, 15lb 2x12  -4 steps reciprocally with handrail, x3  -2 inch lateral step ups, 2x10 each  -fwd step ups on B LE , 3rd lowest level of the K hip platform, 2x10  -sit to stand from mat table x10, slow lowering     Manual:   Pt in R SL with pillow between knees. STM to L gluteals with sacral and ITBand release using medium pressure , ischemic pressure for trigger points .      Functional Outcome Score:   LEFS=54/80 or  67.5%    Assessment & Plan       Assessment  Assessment details:  Hellen Delgado was seen for 14 physical therapy sessions for B hip pain and L glute med tear.  Treatment included therapeutic exercise, manual therapy, therapeutic activity, neuro-muscular retraining , gait training, ultrasound, patient education with home exercise program , and dry needling . Progress to physical therapy goals was good. Hellen has little to no pain in the hips, is able to sleep on the L side for at least 3 hours, and can perform stairs with a reciprocal patter with minimal L hip discomfort. She was discharged to an independent HEP and provided patient education to self-manage condition.         Goals  Plan Goals: ST wks  1. Patient will be independent with education for symptom management, joint protection and strategies to minimize stress on affected tissues (MET)   2. Patient will be able to lay on her L side for 1 hour during sleep without waking from pain (MET)   3. Pt to have no more than 3/10 pain in the L hip/buttocks when transferring from sit to stand (MET)      LT wks  1. Pt to be able to sleep on her L side for 3 hours without increased pain  (MET)   2. Pt to improve score on LEFS from 60% to 80% for overall functional improvement (NOT MET)  improved to 67.5%  3. Patient will increase L hip strength to 4+/5 to improve functional mobility with transfers (PART MET)    4. Patient will negotiate stairs reciprocally without rail support for greater ease in walking into her back door without assist from her  (MET)    5. Patient will demonstrate an independent HEP for core and hip strength and flexibility/ROM. (MET)       Plan  Treatment plan discussed with: patient           Recommendations: Discharge  Timeframe:  today  Prognosis to achieve goals: good    PT Signature: Sierra Ortega PT, CDNT    License Number: [id number]    Electronically signed by Sierra Ortega PT, 10/04/24, 7:31 AM EDT      Based upon  review of the patient's progress and continued therapy plan, it is my medical opinion that Hellen Delgado should continue physical therapy treatment at [facility]  [address] [place] [address] [address] [address] [phone].    Signature: __________________________________  Eirca Marmolejo APRN    Timed:  Manual Therapy:    10     mins  73932;  Therapeutic Exercise:    25     mins  67623;     Neuromuscular John:        mins  64132;    Therapeutic Activity:     10     mins  73060;     Gait Training:           mins  03379;     Ultrasound:          mins  34520;    Iontophoresis         mins 29646;  Group therapy                   mins 91482    Untimed:  Electrical Stimulation:         mins  32044 ( );  Traction:         mins  76901;   Dry Needling   (1-2 muscles)            mins 70019 (Self-pay)  Dry Needling (3-4 muscles)             mins 20561 (Self-pay)  Dry Needling Trial                 mins DRYNDLTRIAL  (No Charge)    Timed Treatment:   45   mins   Total Treatment:     45   mins

## 2025-04-15 ENCOUNTER — OFFICE VISIT (OUTPATIENT)
Age: 75
End: 2025-04-15
Payer: MEDICARE

## 2025-04-15 VITALS — BODY MASS INDEX: 37.49 KG/M2 | RESPIRATION RATE: 18 BRPM | HEIGHT: 65 IN | WEIGHT: 225 LBS

## 2025-04-15 DIAGNOSIS — G89.29 CHRONIC PAIN OF RIGHT THUMB: Primary | ICD-10-CM

## 2025-04-15 DIAGNOSIS — M79.644 CHRONIC PAIN OF RIGHT THUMB: Primary | ICD-10-CM

## 2025-04-15 PROCEDURE — 1160F RVW MEDS BY RX/DR IN RCRD: CPT | Performed by: STUDENT IN AN ORGANIZED HEALTH CARE EDUCATION/TRAINING PROGRAM

## 2025-04-15 PROCEDURE — 99204 OFFICE O/P NEW MOD 45 MIN: CPT | Performed by: STUDENT IN AN ORGANIZED HEALTH CARE EDUCATION/TRAINING PROGRAM

## 2025-04-15 PROCEDURE — 1159F MED LIST DOCD IN RCRD: CPT | Performed by: STUDENT IN AN ORGANIZED HEALTH CARE EDUCATION/TRAINING PROGRAM

## 2025-04-15 RX ORDER — CONJUGATED ESTROGENS 0.62 MG/G
CREAM VAGINAL
COMMUNITY
Start: 2003-05-01

## 2025-04-15 RX ORDER — EPINEPHRINE 0.3 MG/.3ML
INJECTION SUBCUTANEOUS
COMMUNITY
Start: 2025-02-07

## 2025-04-15 RX ORDER — ALBUTEROL SULFATE 90 UG/1
2 INHALANT RESPIRATORY (INHALATION)
COMMUNITY
Start: 2019-10-01

## 2025-04-15 RX ORDER — VENLAFAXINE HYDROCHLORIDE 75 MG/1
75 CAPSULE, EXTENDED RELEASE ORAL DAILY
COMMUNITY

## 2025-04-15 RX ORDER — BUDESONIDE AND FORMOTEROL FUMARATE DIHYDRATE 160; 4.5 UG/1; UG/1
AEROSOL RESPIRATORY (INHALATION)
COMMUNITY

## 2025-05-07 NOTE — PROGRESS NOTES
Chief Complaint   Patient presents with    Right Hand - Initial Evaluation, Edema     Pt being seen for right hand and wrist pain, some throbbing.        History of Present Illness  Hellen is a 74 y.o. year old RHD female here today for right hand and wrist pain that has been present since she got caught in the door handle of a dressing room about 5 months ago.   History of Present Illness  The patient presents for evaluation of right hand pain.    She experienced an incident approximately 5 months ago where she was unable to open a dressing room door while her hand was on the handle. Her granddaughter assisted in pulling the door, resulting in immediate pain and soreness in her right hand. The pain has persisted since then, causing difficulty in lifting objects due to the associated discomfort and swelling. She reports no numbness, tingling, or bruising. The pain is localized more towards the medial aspect of her thumb on the palmar side, extending into the wrist area. She does not experience any popping sensation in her thumb. The intensity of the pain varies, sometimes becoming so severe that she needs to rest her hand. She suspects an internal issue or the need for immobilization. She is currently unemployed but engages in gardening activities. She has been managing the pain with 2 to 3 Advil tablets as needed, which provides relief. She also uses a brace intermittently but finds it frustrating to use with her right hand. She has previously used Voltaren gel for topical application.    SOCIAL HISTORY  Exercise: Gardening         The following data was reviewed by: Jeanna Donis DO on 04/15/2025:  Data reviewed :        COMPREHENSIVE METABOLIC PANEL  Order: [id number]  Component  Ref Range & Units 1 mo ago   Sodium  136 - 145 mmol/L 142   Comment: Excess protein and/or lipids can falsely decrease sodium levels (pseudo hyponatremia).   Potassium  3.5 - 5.1 mmol/L 4.4   Comment: Falsely elevated potassium can  "occur in patients with high WBC or platelet counts.   Chloride  98 - 107 mmol/L 107   Carbon Dioxide  22 - 29 mmol/L 27   Anion Gap  5 - 13 mmol/L 8   Comment: Calculation: Na - (Cl + CO2)   Glucose, Random  71 - 99 mg/dL 102 High    Blood Urea Nitrogen (BUN)  10 - 20 mg/dL 13   Creatinine, Blood  0.55 - 1.02 mg/dL 0.9   BUN/Creatinine Ratio  RATIO 14.4   Estimated GFR (Cr)  >60 mL/min/1.73m2 68   Comment: eGFR calculated based on IDMS traceable, enzymatic creatinine method using the CKD-EPI 2021 equation.   Total Protein  6.2 - 8.0 g/dL 6.8   Albumin  3.2 - 4.6 g/dL 4.3   Globulin  1.5 - 4.5 g/dL 2.5   Albumin/Globulin Ratio  1.1 - 2.5 RATIO 1.7   Calcium  8.4 - 10.2 mg/dL 8.8   Total Bilirubin  0.2 - 1.2 mg/dL 0.6   AST/SGOT  10 - 35 U/L 25   ALT/SGPT  10 - 35 U/L 26   Alkaline Phosphatase  40 - 150 U/L 101   Resulting Agency [facility]     Specimen Collected: 03/11/25 09:03    Performed by: [place] [employer] [facility] Last Resulted: 03/11/25 15:27   Received From: [facility]  Result Received: 04/15/25 14:04       Resp 18   Ht 163.8 cm (64.5\")   Wt 102 kg (225 lb)   BMI 38.02 kg/m²        Physical Exam  Vital signs reviewed.   General: Well developed, well nourished; No acute distress.  Eyes: conjunctiva clear; pupils equally round and reactive  ENT: external ears and nose atraumatic; hearing normal  Resp: normal respiratory effort, no use of accessory muscles  Skin: normal color and pigmentation; no rashes or wounds; normal turgor  Psych: alert and oriented; mood and affect appropriate; recent and remote memory intact    MSK Exam:  The right wrist is without obvious signs of acute bony deformity, swelling, erythema or ecchymosis. There is tenderness to the first CMC joint as well as the first dorsal compartment. No bony tenderness of the distal radius or ulna. Active and passive range of motion at the wrist are full and symmetrical, and with mild pain pain-free. Strength testing of the wrist and and " hand are 5/5 with mild pain along the thumb and radial aspect of the wrist.  Finkelstein's maneuver tests is painful, though difficult to determine if pain is more from the joint or first dorsal compartment. Sensory and vascular exams are otherwise normal.        Right Wrist X-Ray  Indication: Pain  Views: AP, Lateral, and Oblique  Findings:  No fracture  No bony lesion  Normal soft tissues  Degenerative changes at the first CMC and triscaphe joints, with mild narrowing at the radiocarpal joint as well  Scattered IP arthritis, most notably at the 1st IP joint  No prior studies were available for comparison.    Assessment and Plan  Diagnoses and all orders for this visit:    1. Chronic pain of right thumb (Primary)  -     Diclofenac Sodium (VOLTAREN) 1 % gel gel; Apply 4 g topically to the appropriate area as directed 4 (Four) Times a Day As Needed (pain).  Dispense: 150 g; Refill: 3    Hellen is a 74 y.o. year old RHD female here today for right hand and wrist pain that has been present since she got caught in the door handle of a dressing room about 5 months ago.    Assessment & Plan  1. Right hand pain:  X-rays show arthritis, worse at the first CMC joint and triscaphe joints. Pain and tenderness are noted in the medial part of the thumb on the palm side and down into the wrist area. No numbness, tingling, or bruising is reported. A brace will be provided to immobilize the thumb and allow it to rest. We discussed risks and benefits of oral anti-inflammatory use. A prescription for oral meloxicam was offered but declined. I recommended OTCNSAID, such ibuprofen 800 mg every 8 hours regularly for the next 7-14 days then as needed. May take Tylenol in addition as needed. Should be taken with food due to a history of reflux. Continue taking Protonix twice daily. Apply Voltaren gel 3 to 4 times daily. If there is no improvement after a couple of weeks, consider injections (either around the tendons or into the joint  space depending on symptoms).     Follow-up: Reassess in a couple of weeks to evaluate the effectiveness of the current treatment plan.  All of her questions were answered and she is agreeable with the plan.    Dictated utilizing Dragon dictation.  Patient or patient representative verbalized consent for the use of Ambient Listening during the visit with  Jeanna Donis DO for chart documentation. 4/15/2025  15:07 EDT

## 2025-05-21 ENCOUNTER — OFFICE VISIT (OUTPATIENT)
Age: 75
End: 2025-05-21
Payer: MEDICARE

## 2025-05-21 VITALS — HEIGHT: 65 IN | BODY MASS INDEX: 38.65 KG/M2 | WEIGHT: 232 LBS | TEMPERATURE: 97.8 F

## 2025-05-21 DIAGNOSIS — M18.11 ARTHRITIS OF CARPOMETACARPAL (CMC) JOINT OF RIGHT THUMB: ICD-10-CM

## 2025-05-21 DIAGNOSIS — G89.29 CHRONIC PAIN OF RIGHT THUMB: Primary | ICD-10-CM

## 2025-05-21 DIAGNOSIS — M79.644 CHRONIC PAIN OF RIGHT THUMB: Primary | ICD-10-CM

## 2025-05-21 PROCEDURE — 99213 OFFICE O/P EST LOW 20 MIN: CPT | Performed by: STUDENT IN AN ORGANIZED HEALTH CARE EDUCATION/TRAINING PROGRAM

## 2025-05-21 PROCEDURE — 1159F MED LIST DOCD IN RCRD: CPT | Performed by: STUDENT IN AN ORGANIZED HEALTH CARE EDUCATION/TRAINING PROGRAM

## 2025-05-21 PROCEDURE — 1160F RVW MEDS BY RX/DR IN RCRD: CPT | Performed by: STUDENT IN AN ORGANIZED HEALTH CARE EDUCATION/TRAINING PROGRAM

## 2025-05-21 NOTE — PROGRESS NOTES
"Chief Complaint   Patient presents with    Right Hand - Follow-up, Pain       History of Present Illness  Hellen is a 74 y.o. year old RHD female here today for follow-up of right hand and wrist pain that has been present since she got caught in the door handle of a dressing room about 5 months ago. X-rays show arthritis, worse at the first CMC joint and triscaphe joints. Pain and tenderness are noted at the CMC joint of the thumb on the palm side and down into the wrist area along the first dorsal compartment. I recommended conservative management with bracing and anti-inflammatories. Please see previous notes for complete history and treatment course.     History of Present Illness  She reports improvement in her condition, although she continues to experience mild tenderness. There are no instances of popping or locking. Most of the tenderness is localized through the base of the thumb and in to the palmar aspect. She has been utilizing a thumb brace as frequently as possible, with the exception of during sleep. Relief has been found from the application of Voltaren cream and the use of Advil, particularly when she has exerted herself excessively. She has been using a wrist brace intermittently, especially when engaging in activities that may exacerbate her symptoms. No new injuries or complaints.      Temp 97.8 °F (36.6 °C) (Temporal)   Ht 163.8 cm (64.5\")   Wt 105 kg (232 lb)   BMI 39.21 kg/m²        Physical Exam  MSK Exam:  The right hand and wrist is without obvious signs of acute bony deformity, swelling, erythema or ecchymosis. There is mild persistent tenderness to the first CMC joint more so than the first dorsal compartment. No bony tenderness of the distal radius or ulna. Strength testing of the hand is 5/5. Finkelstein's maneuver tests is negative. Sensory and vascular exams are otherwise normal.       Assessment and Plan  Diagnoses and all orders for this visit:    1. Chronic pain of right thumb " (Primary)    2. Arthritis of carpometacarpal (CMC) joint of right thumb      Hellen is a 74 y.o. year old RHD female here today for follow-up of right hand and wrist pain that has been present since she got caught in the door handle of a dressing room about 5 months ago. X-rays show arthritis, worse at the first CMC joint and triscaphe joints. Pain and tenderness are noted at the CMC joint of the thumb on the palm side and down into the wrist area along the first dorsal compartment. I recommended conservative management with bracing and anti-inflammatories. She returns today with improvement, but mild persistent symptoms.   Assessment & Plan  Continue using Voltaren cream 3-4 times daily and wear the brace as needed. If pain becomes bothersome or limiting, consider a CMC joint injection with to reduce inflammation and pain. Monitor symptoms and contact the office if pain persists or worsens.    All of her questions were answered and she is agreeable with the plan.    Dictated utilizing Dragon dictation.  Patient or patient representative verbalized consent for the use of Ambient Listening during the visit with  Jeanna Donis DO for chart documentation. 5/21/2025  08:14 EDT

## 2025-06-30 ENCOUNTER — OFFICE VISIT (OUTPATIENT)
Dept: ORTHOPEDIC SURGERY | Facility: CLINIC | Age: 75
End: 2025-06-30
Payer: MEDICARE

## 2025-06-30 VITALS — HEIGHT: 65 IN | TEMPERATURE: 97.6 F | BODY MASS INDEX: 37.82 KG/M2 | WEIGHT: 227 LBS

## 2025-06-30 DIAGNOSIS — R52 PAIN: Primary | ICD-10-CM

## 2025-06-30 DIAGNOSIS — M54.40 ACUTE RIGHT-SIDED LOW BACK PAIN WITH SCIATICA, SCIATICA LATERALITY UNSPECIFIED: ICD-10-CM

## 2025-06-30 PROCEDURE — 1159F MED LIST DOCD IN RCRD: CPT | Performed by: NURSE PRACTITIONER

## 2025-06-30 PROCEDURE — 73522 X-RAY EXAM HIPS BI 3-4 VIEWS: CPT | Performed by: NURSE PRACTITIONER

## 2025-06-30 PROCEDURE — 1160F RVW MEDS BY RX/DR IN RCRD: CPT | Performed by: NURSE PRACTITIONER

## 2025-06-30 PROCEDURE — 99214 OFFICE O/P EST MOD 30 MIN: CPT | Performed by: NURSE PRACTITIONER

## 2025-06-30 RX ORDER — METHYLPREDNISOLONE 4 MG/1
TABLET ORAL
Qty: 21 TABLET | Refills: 0 | Status: SHIPPED | OUTPATIENT
Start: 2025-06-30

## 2025-06-30 NOTE — PROGRESS NOTES
"Patient: Hellen Delgado  YOB: 1950 74 y.o. female  Medical Record Number: [id number]    Chief Complaints:   Chief Complaint   Patient presents with    Left Hip - Initial Evaluation    Right Hip - Initial Evaluation       History of Present Illness:Hellen Delgado is a 74 y.o. female who presents with complaints of bilateral hip and leg pain.  The patient reports starting on June 18 she started with pain in her left posterior buttock area to the outside of her left hip that actually is not as bad but unfortunately several days later started with pain in her right posterior hip going down her leg as well as weakness.  She denies any specific injury.  She reports the pain is worse with walking and standing.  She denies any groin pain.    Allergies:   Allergies   Allergen Reactions    Gabapentin      MIGRAINE    Telithromycin Other (See Comments)     PALPITATIONS LIVER ABNORMALITY  palpitiaotns  Liver anbnl  CAN TAKE ZPAK OK     Cephalexin Palpitations and Rash    Tegaderm Ag Mesh 2\"X2\" [Wound Dressings] Rash    Thyroid Rash     Bennett thyroid       Medications:   Current Outpatient Medications   Medication Sig Dispense Refill    albuterol sulfate  (90 Base) MCG/ACT inhaler Inhale 2 puffs.      atorvastatin (LIPITOR) 40 MG tablet Take 1 tablet by mouth Every Night.      Azelastine HCl 137 MCG/SPRAY solution Inhale 200 sprays 2 (Two) Times a Day.      betamethasone dipropionate (DIPROLENE) 0.05 % cream       budesonide-formoterol (SYMBICORT) 160-4.5 MCG/ACT inhaler inhale 1 puff into the lungs twice a day      Diclofenac Sodium (VOLTAREN) 1 % gel gel Apply 4 g topically to the appropriate area as directed 4 (Four) Times a Day As Needed (pain). 150 g 3    EPINEPHrine (EPIPEN) 0.3 MG/0.3ML solution auto-injector injection USE AS DIRECTED FOR ACUTE ALLERGIC REACTION      Estrogens Conjugated (Premarin) 0.625 MG/GM vaginal cream       fexofenadine (ALLEGRA) 180 MG tablet Take 1 tablet by mouth As " "Needed.      Melatonin 5 MG capsule Take 5 mg by mouth Daily.      pantoprazole (PROTONIX) 40 MG EC tablet Take 1 tablet by mouth Daily.      SYNTHROID 125 MCG tablet TAKE 1 TABLET BY MOUTH DAILY BRAND NAME ONLY SYNTHROID.  1    triamcinolone (KENALOG) 0.1 % cream Apply 1 Application topically to the appropriate area as directed 2 (Two) Times a Day As Needed. PRN      venlafaxine XR (EFFEXOR-XR) 75 MG 24 hr capsule Take 1 capsule by mouth Daily.      vitamin D3 (Vitamin D) 125 MCG (5000 UT) capsule capsule       multivitamin with minerals (Centrum Silver 50+Women) tablet tablet        No current facility-administered medications for this visit.     Facility-Administered Medications Ordered in Other Visits   Medication Dose Route Frequency Provider Last Rate Last Admin    mupirocin (BACTROBAN) 2 % nasal ointment   Nasal BID Stan Kinsey MD             The following portions of the patient's history were reviewed and updated as appropriate: allergies, current medications, past family history, past medical history, past social history, past surgical history and problem list.    Review of Systems:   14 point review of systems was performed. All systems negative except pertinent positives/negatives listed in HPI above    Physical Exam:   Vitals:    06/30/25 0850   Temp: 97.6 °F (36.4 °C)   Weight: 103 kg (227 lb)   Height: 163.8 cm (64.5\")       General: A and O x 3, ASA, NAD   Skin clear no unusual lesions noted  Bilateral hips patient is slightly tender palpation of the bilateral hip greater trochanteric bursa she otherwise has normal internal extra rotation with a negative [facility] negative logroll she does however have pain with range of motion of the lower back and even with straightening up her posture she has severe pain while on the exam table      Radiology:  Xrays 2 views of bilateral hips were ordered and reviewed today secondary to pain the patient does have some mild arthritic changes noted right hip " minimal arthritic changes noted left she does however have significant arthritic changes noted of the lower lumbar spine.  Comparative views are unchanged    Assessment/Plan: Severe low back pain, worsening in nature    Patient and I discussed options unfortunately it does appear as though the majority of her symptoms are coming from her lower back, her hip examination is completely normal.  Will try a Medrol Dosepak as well as physical therapy the patient will definitely let me know if her symptoms do not improve and I will get her into see Sana Franz as soon as possible if needed      Erica Marmolejo, APRN  6/30/2025

## 2025-07-07 ENCOUNTER — TREATMENT (OUTPATIENT)
Dept: PHYSICAL THERAPY | Facility: CLINIC | Age: 75
End: 2025-07-07
Payer: MEDICARE

## 2025-07-07 DIAGNOSIS — R26.2 DIFFICULTY WALKING: ICD-10-CM

## 2025-07-07 DIAGNOSIS — M54.16 RADICULOPATHY, LUMBAR REGION: ICD-10-CM

## 2025-07-07 DIAGNOSIS — M54.41 ACUTE RIGHT-SIDED LOW BACK PAIN WITH RIGHT-SIDED SCIATICA: Primary | ICD-10-CM

## 2025-07-07 DIAGNOSIS — Z74.09 IMPAIRED FUNCTIONAL MOBILITY AND ACTIVITY TOLERANCE: ICD-10-CM

## 2025-07-07 PROCEDURE — 97110 THERAPEUTIC EXERCISES: CPT | Performed by: PHYSICAL THERAPIST

## 2025-07-07 PROCEDURE — 97162 PT EVAL MOD COMPLEX 30 MIN: CPT | Performed by: PHYSICAL THERAPIST

## 2025-07-07 NOTE — PROGRESS NOTES
Physical Therapy Initial Evaluation and Plan of Care      [facility] [place] [address]  [phone] (phone)  [phone] (fax)    Patient: Hellen Delgado  : 1950  Diagnosis/ICD-10 Code:  Acute right-sided low back pain with right-sided sciatica [M54.41]  Referring practitioner: KLEVER Pozo  Date of Initial Visit: Type: THERAPY  Noted: 2025  Today's Date: 2025  Patient seen for 1 session    Visit Diagnoses:    ICD-10-CM ICD-9-CM   1. Acute right-sided low back pain with right-sided sciatica  M54.41 724.2     724.3   2. Radiculopathy, lumbar region  M54.16 724.4   3. Impaired functional mobility and activity tolerance  Z74.09 V49.89   4. Difficulty walking  R26.2 719.7            Subjective Evaluation    History of Present Illness  Date of onset: 2025  Mechanism of injury: Hellen went to [employer] (2 weeks after [employer]) and got on a roller coaster ride. Then about 3 days later, she couldn't get out of bed. She started with pain in the L buttocks, felt like a baseball back there. Used ice and heat. Then it moved over to the R side, having a hard time putting weight through the R leg. Pain is worse in the am. Then more intermittent throughout the day. She went to [facility] and got a dose pack. She was using a walker but now has progressed to the SC. She is using the cane PRN now. She has a history of lumbar surgeries ( and ), discectomy and laminectomy.   PMHx reviewed in chart      Patient Occupation: retired Pain  Current pain ratin  At worst pain ratin  Location: LBP, R LE pain  Quality: sharp, knife-like and radiating  Relieving factors: change in position, medications and rest (sitting, Voltarin gel)  Aggravating factors: ambulation and standing (some house chores (yard work, vacuuming))    Social Support  Lives in: one-story house  Lives with: spouse    Diagnostic Tests  X-ray: abnormal (mild  arthritic changes noted right hip minimal arthritic changes noted left she does however have significant arthritic changes noted of the lower lumbar spine)    Treatments  Previous treatment: medication (finished medrol dose pack on 7/5/25)  Patient Goals  Patient goals for therapy: decreased pain and return to sport/leisure activities             Objective          Static Posture     Comments  Level iliac crests, decreased lumbar lordosis    Palpation   Left   Hypertonic in the erector spinae, lumbar paraspinals and quadratus lumborum.   Tenderness of the erector spinae, lumbar paraspinals and quadratus lumborum.     Right   Hypertonic in the erector spinae, gluteus medius, lumbar paraspinals, piriformis and quadratus lumborum. Tenderness of the erector spinae, gluteus medius, lumbar paraspinals, piriformis and quadratus lumborum.   Trigger point to gluteus medius, piriformis and quadratus lumborum.     Tenderness     Left Hip   Tenderness in the greater trochanter.     Right Hip   Tenderness in the greater trochanter.     Neurological Testing     Sensation     Lumbar   Left   Intact: light touch    Right   Intact: light touch    Active Range of Motion     Additional Active Range of Motion Details  Decreased ROM and pain on the R with ext, B lateral flexion  Hinging from upper lumbar with movement    Strength/Myotome Testing     Left Hip   Planes of Motion   Flexion: 4+  Abduction: 4  Adduction: 4+  External rotation: 4    Right Hip   Planes of Motion   Flexion: 4  Abduction: 4-  Adduction: 4+  External rotation: 4-    Left Knee   Flexion: 4+  Extension: 5    Right Knee   Flexion: 4-  Extension: 4+    Left Ankle/Foot   Dorsiflexion: 4+    Right Ankle/Foot   Dorsiflexion: 4+    Additional Strength Details  Core strength 4/5    Tests       Thoracic   Negative slump.     Lumbar     Left   Negative passive SLR and quadrant.     Right   Negative passive SLR and quadrant.     Left Pelvic Girdle/Sacrum   Negative: sacrum  compression and sacral spring.     Right Pelvic Girdle/Sacrum   Positive: sacral spring.   Negative: sacrum compression.     Left Hip   Negative MAYE and FADIR.   90/90 SLR: Negative.   SLR: Negative.     Right Hip   Negative MAYE and FADIR.   90/90 SLR: Negative.  SLR: Negative.     Ambulation     Observational Gait   Gait: antalgic   Decreased right stance time.     Additional Observational Gait Details  Adjusted SC for proper height and had her switch it to using in the L hand        Therapeutic Exercise/Therapeutic Activity/NM re-ed/Education:  -SKTC 3x20 sec  -piriformis stretch  -DKTC 3x20 sec  -supine - decompression in 90/90  -log roll    Manual  Pt in L SL, unilateral lumbar decompression with hand on iliac crest, 5x 30 sec with light STM to SI/gluteals in between.     Will consider dry needling of lumbar spine and R gluteals next session    [employer] Access Code: [email or web address]    Functional Outcome Score:   Oswestry Back Index= 38%      Assessment & Plan       Assessment  Impairments: abnormal gait, abnormal or restricted ROM, activity intolerance, impaired physical strength, lacks appropriate home exercise program and pain with function   Functional limitations: carrying objects, lifting, walking, uncomfortable because of pain, moving in bed, standing and unable to perform repetitive tasks   Assessment details: Hellen Delgado is a 74 y.o. year-old female referred to physical therapy for LBP. She presents with a evolving clinical presentation.  She has comorbidities of two previous lumbar surgeries, OA,  and no personal factors that may affect her progress in the plan of care. She has decreased lumbar ROM, decreased core and R>L LE strength, and decreased flexibility of her hamstrings and hip rotators.  Signs and symptoms are consistent with physical therapy diagnosis of LBP with R lumbar radiculopathy. Pt would benefit from therapy to help improve her ability to walk and return to activities around her  home without pain.   Prognosis: good    Goals  Plan Goals: ST wks  1. Patient will be independent with education for symptom management, joint protection and strategies to minimize stress on affected tissues  2. Pt to improve pain complaints to no more than 3/10 with ADLs  3. Pt able to walk community distances without AD and no increased pain    LT wks  1. Pt to improve pain complaints to no more than 1/10 with ADLs  2. Pt to improve trunk and LE strength by 1/2 to 1 MMT grade for greater ease with returning to house and yard chores  3. Pt to improve Oswestry Back index score from 38% to 20% for overall functional improvement  4. Pt to be independent with HEP for ROM, flexibility and core strength    Plan  Therapy options: will be seen for skilled therapy services  Planned modality interventions: cryotherapy, electrical stimulation/Russian stimulation, TENS, traction, ultrasound, dry needling and thermotherapy (hydrocollator packs)  Planned therapy interventions: abdominal trunk stabilization, ADL retraining, body mechanics training, flexibility, functional ROM exercises, home exercise program, IADL retraining, joint mobilization, manual therapy, neuromuscular re-education, postural training, soft tissue mobilization, spinal/joint mobilization, strengthening, stretching and therapeutic activities  Frequency: 2x week  Duration in weeks: 12  Treatment plan discussed with: patient        Timed:  Manual Therapy:    5     mins  77617;  Therapeutic Exercise:    8     mins  18382;     Neuromuscular John:        mins  93724;    Therapeutic Activity:          mins  14181;     Gait Training:           mins  99111;     Ultrasound:          mins  40471;    Iontophoresis         mins 94338  Group therapy                   mins 04427  Self Care           min  63166      Untimed:  Electrical Stimulation:         mins  26516 ( );  Traction:       mins  27290;   Dry Needling   (1-2 muscles)             mins 20558  (Self-pay)  Dry Needling (3-4 muscles)           mins 20561 (Self-pay)  Dry Needling Trial              mins DRYNDLTRIAL  (No Charge)    Low Eval          Mins  80888  Mod Eval     30     Mins  82528  High Eval                            Mins  62191    Timed Treatment:   13   mins   Total Treatment:     43   mins    PT SIGNATURE: Sierra Ortega PT, CDNT    License Number: [id number]    Electronically signed by Sierra Ortega PT, 07/07/25, 1:19 PM EDT    DATE TREATMENT INITIATED: 7/7/2025    Initial Certification  Certification Period: 10/5/2025  I certify that the therapy services are furnished while this patient is under my care.  The services outlined above are required by this patient, and will be reviewed every 90 days.     PHYSICIAN: Erica Marmolejo APRN   NPI: [id number]                                         DATE:     Please sign and return via fax to [phone] Thank you, [facility].

## 2025-07-10 ENCOUNTER — TREATMENT (OUTPATIENT)
Dept: PHYSICAL THERAPY | Facility: CLINIC | Age: 75
End: 2025-07-10
Payer: MEDICARE

## 2025-07-10 DIAGNOSIS — M54.42 ACUTE BILATERAL LOW BACK PAIN WITH BILATERAL SCIATICA: Primary | ICD-10-CM

## 2025-07-10 DIAGNOSIS — R26.2 DIFFICULTY WALKING: ICD-10-CM

## 2025-07-10 DIAGNOSIS — M54.16 RADICULOPATHY, LUMBAR REGION: ICD-10-CM

## 2025-07-10 DIAGNOSIS — Z74.09 IMPAIRED FUNCTIONAL MOBILITY AND ACTIVITY TOLERANCE: ICD-10-CM

## 2025-07-10 DIAGNOSIS — M54.41 ACUTE BILATERAL LOW BACK PAIN WITH BILATERAL SCIATICA: Primary | ICD-10-CM

## 2025-07-10 NOTE — PROGRESS NOTES
Physical Therapy Daily Treatment Note    [facility]  [address] [place] [address]  [phone] (phone)  [phone] (fax)    Patient: Hellen Delgado  : 1950  Diagnosis/ICD-10 Code:  Acute bilateral low back pain with bilateral sciatica [M54.42, M54.41]  Referring practitioner: KLEVER Pozo  Date of Initial Visit: Type: THERAPY  Noted: 2025  Today's Date: 7/10/2025  Patient seen for 2 session    Visit Diagnoses:    ICD-10-CM ICD-9-CM   1. Acute bilateral low back pain with bilateral sciatica  M54.42 724.2    M54.41 724.3   2. Radiculopathy, lumbar region  M54.16 724.4   3. Impaired functional mobility and activity tolerance  Z74.09 V49.89   4. Difficulty walking  R26.2 719.7            Subjective back is tight, pain into both legs today    Objective     Therapeutic Exercise/Therapeutic Activity/NM re-ed/Education:  -SKTC 3x20 sec  -piriformis stretch  -DKTC 3x20 sec  -using Lo Carrie Trax for lumbar decompression and education on the device - 5 min    Dry Needling:  Dry needling, using threading and direct techniques, obtaining written and verbal consent to treat after discussing benefits and risks.   Patient position during treatment: prone over a pillow. Muscles treated: B lumbar PVMs, mulifidus, and QL . Response: LTR .Clean needle technique observed at all times, precautions for lung fields, neurovascular structures observed. Manual palpation and assessment performed before, during, and after session.  Left 3 needles on each side on the multifidus muscles for about 10 min, using coning to get different areas of the muscle     Manual  Pt in prone over a pillow, STM to B lumbar PVMs with lumbar distraction having both hands on the ileum pressing caudally using 20 sec holds           Assessment/Plan  Pt with pain into B LE's today to the knee, R>L pain. She has a lot of tightness in her lumbar muscles and wanted to proceed with dry needling  as it has worked for other areas of the body in the past. She did have a nice release of tissue and decreased pain levels after the session. Will continue to monitor         Timed:    Manual Therapy:    15     mins  81309;  Therapeutic Exercise:    15     mins  97527;     Neuromuscular John:        mins  80898;    Therapeutic Activity:          mins  77060;     Gait Training:           mins  40173;     Ultrasound:          mins  55616;    Electrical Stimulation:         mins  83036 ( );  Iontophoresis         mins 14066;  Aquatic Therapy         mins 57256;  Group Therapy                   mins 06975  Self Care           min  03632    Untimed:  Electrical Stimulation:         mins  80764 ( );  Traction:         mins  80460;   Dry Needling   (1-2 muscles)     15        mins 20560 (Self-pay)  Dry Needling (3-4 muscles)           mins 20561 (Self-pay)  Dry Needling Trial              mins DRYNDLTRIAL  (No Charge)    Timed Treatment:   30   mins   Total Treatment:     45   mins    Sierra Oretga PT, CDNT  Physical Therapist    [place] License:[id number]

## 2025-07-15 ENCOUNTER — TREATMENT (OUTPATIENT)
Dept: PHYSICAL THERAPY | Facility: CLINIC | Age: 75
End: 2025-07-15
Payer: MEDICARE

## 2025-07-15 DIAGNOSIS — M54.16 RADICULOPATHY, LUMBAR REGION: ICD-10-CM

## 2025-07-15 DIAGNOSIS — M54.41 ACUTE BILATERAL LOW BACK PAIN WITH BILATERAL SCIATICA: Primary | ICD-10-CM

## 2025-07-15 DIAGNOSIS — R26.2 DIFFICULTY WALKING: ICD-10-CM

## 2025-07-15 DIAGNOSIS — M54.42 ACUTE BILATERAL LOW BACK PAIN WITH BILATERAL SCIATICA: Primary | ICD-10-CM

## 2025-07-15 DIAGNOSIS — Z74.09 IMPAIRED FUNCTIONAL MOBILITY AND ACTIVITY TOLERANCE: ICD-10-CM

## 2025-07-15 PROCEDURE — 97140 MANUAL THERAPY 1/> REGIONS: CPT | Performed by: PHYSICAL THERAPIST

## 2025-07-15 PROCEDURE — 97110 THERAPEUTIC EXERCISES: CPT | Performed by: PHYSICAL THERAPIST

## 2025-07-15 NOTE — PROGRESS NOTES
Physical Therapy Daily Treatment Note    [facility]  [address] [place] [address]  [phone] (phone)  [phone] (fax)    Patient: Hellen Delgado  : 1950  Diagnosis/ICD-10 Code:  Acute bilateral low back pain with bilateral sciatica [M54.42, M54.41]  Referring practitioner: KLEVER Pozo  Date of Initial Visit: Type: THERAPY  Noted: 2025  Today's Date: 7/15/2025  Patient seen for 3 session    Visit Diagnoses:    ICD-10-CM ICD-9-CM   1. Acute bilateral low back pain with bilateral sciatica  M54.42 724.2    M54.41 724.3   2. Radiculopathy, lumbar region  M54.16 724.4   3. Impaired functional mobility and activity tolerance  Z74.09 V49.89   4. Difficulty walking  R26.2 719.7            Subjective  is was sore for the rest of the day after the needling    Objective     Therapeutic Exercise/Therapeutic Activity/NM re-ed/Education:  -ab brace 20x 5 sec hold, exhale with TA contraction   -hip add iso w/ball 20x 5 sec hold, exhale with TA contraction   -small lift bridge x20  -SKTC 3x20 sec  -piriformis stretch  -DKTC 3x20 sec     Manual  Pt in L and R SL with pillow between knees. STM to the gluteals with smooth end of  hand tool with sacral and ITBand release (using end of tool), manual distraction to L/S area with hand placement on iliac crest        Assessment/Plan  Pt did get a little relief from the dry needling last session. Her radicular pain has continued into B LE now. She is getting some relief from positional distraction and stretching. Pt to contact KLEVER about seeing KLEVER Anthony with the neuro group and see about an MRI/LESI         Timed:    Manual Therapy:    20     mins  63468;  Therapeutic Exercise:    23     mins  13531;     Neuromuscular John:        mins  95740;    Therapeutic Activity:          mins  03901;     Gait Training:           mins  09220;     Ultrasound:          mins  56003;    Electrical  Stimulation:         mins  99301 ( );  Iontophoresis         mins 13510;  Aquatic Therapy         mins 65918;  Group Therapy                   mins 88630  Self Care           min  81110    Untimed:  Electrical Stimulation:         mins  76490 ( );  Traction:         mins  55490;   Dry Needling   (1-2 muscles)             mins 93885 (Self-pay)  Dry Needling (3-4 muscles)           mins 20561 (Self-pay)  Dry Needling Trial              mins DRYNDLTRIAL  (No Charge)    Timed Treatment:   43   mins   Total Treatment:     43   mins    Sierra Ortega PT, CDNT  Physical Therapist    [place] License:[id number]

## 2025-07-16 ENCOUNTER — TELEPHONE (OUTPATIENT)
Dept: ORTHOPEDIC SURGERY | Facility: CLINIC | Age: 75
End: 2025-07-16
Payer: MEDICARE

## 2025-07-16 NOTE — TELEPHONE ENCOUNTER
Patient called and stated that things are not improving with her lower back pain. The dosepak did not help and neither is physical therapy. Patient stats that she is having radiating pain down both legs is having a hard time walking from the pain. Patient would like to know what her next in treatment are.

## 2025-07-17 ENCOUNTER — TELEPHONE (OUTPATIENT)
Dept: ORTHOPEDICS | Facility: OTHER | Age: 75
End: 2025-07-17
Payer: MEDICARE

## 2025-07-17 NOTE — PROGRESS NOTES
Patient Name: Hellen Delgado   YOB: 1950  Referring Primary Care Physician: Yadi Harris MD      Chief Complaint:    Chief Complaint   Patient presents with    Lumbar Spine - Pain, Initial Evaluation        Previous Treatment:   [facility] per [facility]    Spine Surgery  1998 - [facility]   2001- [facility]        Back Pain  Chronicity:  Chronic  Onset:  More than 1 year ago  Frequency:  Constantly  Pain location:  Lumbar spine  Pain quality:  Aching and stabbing (Sharp, throbbing)  Radiates to:  Left thigh, left knee and left buttock (LT HIP)  Pain-numeric:  8/10  Pain severity:  Severe  Aggravated by:  Standing (walking)  Associated symptoms: leg pain and weakness    Associated symptoms: no bladder incontinence, no bowel incontinence, no numbness and no tingling    Treatments tried:  Physical therapy, analgesics, NSAIDs, heat and bed rest (Oral steroid)  Improvement on treatment:  Mild       HPI:  Hellen Delgado is a 74 y.o. female who presents to [facility] for evaluation of above complaints.  She has a longstanding history of back issues with the 2 prior back surgeries.  She does report doing well with both surgeries.  Current symptoms started in the left low back and buttock then switched to the right and now back into the left buttock and lateral thigh.  She denies numbness or tingling.  This is an established patient to the practice who recently saw Patsy Marmolejo for the hip complaints, new to me.  Based on her history of lumbar surgery and pain referring into the thigh she was referred for further evaluation of her lumbar spine.  She is accompanied by her daughter today.  Prior pertinent records were reviewed.    PFSH:  See attached    ROS: As per HPI, otherwise negative    Objective:      74 y.o. female  Body mass index is 38.84 kg/m²., 104 kg (229 lb 12.8 oz), @HT@  Vitals:    07/22/25 0801   Temp: 97.8 °F (36.6 °C)     Pain Score    07/22/25 0801   PainSc: 8     PainLoc: Back            Spine Musculoskeletal Exam    Gait    Gait is normal.    Inspection    Coronal balance: no imbalance    Sagittal balance: no imbalance    Palpation    Thoracolumbar    Tenderness: present    Right      Muscle tone: normal    Left      Muscle tone: normal    Strength    Thoracolumbar    Thoracolumbar motor exam is normal.       Sensory    Thoracolumbar      Left      Lateral thigh: decreased    Reflexes    Right      Quadriceps: hyporeflexic      Achilles: hyporeflexic     Left      Quadriceps: hyporeflexic      Achilles: hyporeflexic    Special Tests    Thoracolumbar    Erika's: absent      Right      SLR: no back or leg pain      Left      MAYE test: positive      Gaenslen's: positive      SLR: pain radiates to left leg      SLR pain at: 80 degrees    General      Constitutional: severely obese, well-developed and well-nourished    Scleral icterus: no    Labored breathing: no    Psychiatric: normal mood and affect and no acute distress    Neurological: alert and oriented x3    Skin: intact        IMAGING:     Indication: pain related symptoms,  Views: 2V AP&LAT lumbar  Findings: Mild dextroscoliosis, disc space settling most pronounced L1-2, L4-5 and L5-S1 otherwise mild facet degenerative change and 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  She did have MRI of the hip 07/16/2024 that reported  1. Moderate left gluteus minimus and anterior gluteus medius  tendinopathy with mild partial-thickness tear at the greater trochanter.  2. Mild to moderate right gluteus minimus and anterior gluteus medius  tendinopathy.  3. Minimal bilateral greater trochanter bursitis.  4. Mild to moderate multifocal DJD with small bilateral hip joint  effusion    Official radiology interpretation will follow and be available in the patient medical records. Patient will be notified of any pertinent discrepancies.  Addendum 07/23/2025:  1. Dextroscoliotic curvature of the  lumbar spine.   2. Facet arthritis.   3. Degenerative disc disease best demonstrated at L1-L2, and L5 S1.   4. There appears to be bony fusion at the L4-L5 level.  Assessment:           Diagnoses and all orders for this visit:    1. Degeneration of intervertebral disc of lumbar region with lower extremity pain (Primary)  -     MRI Lumbar Spine With & Without Contrast; Future    2. Pain  -     XR Spine Lumbar 2 or 3 View             Plan:  She has been through 3 weeks of physical therapy making no progress and pain in the left buttock and lateral thigh has become quite severe.  She also tried a Medrol Dosepak, Voltaren cream and alternating ice and heat with no improvement.  Given her history of lumbar surgery we will submit for lumbar MRI with and without contrast and follow-up afterwards to see how to proceed whether that be referral to pain management for injection options versus neurosurgery.  She was encouraged to keep up with the physical therapy for pain relieving techniques until we have more information in regards to the source of this pain.    Return for After radiographic tests.    EMR Dragon/Transcription Disclaimer:   Much of this encounter note is an electronic transcription/translation of spoken language to printed text utilizing Dragon dictation.  Red flags have been discussed at this or previous visits to include but not limited weakness in extremities, worsening pain that does not respond to conservative treatment and bowel or bladder dysfunction. These are reasons to present to ER and patient has been informed.    The diagnosis(es), natural history, pathophysiology and treatment for diagnosis(es) were discussed. Opportunity given and questions answered. Biomechanics of pertinent body areas discussed.    EXERCISES:  Advice on benefits of, and types of regular/moderate exercise pertaining to diagnosis.  Continue HEP. For back or neck pain, recommend pilates and or yoga as tolerated. Generally it is  best to start any new exercise under the guidance of a  or therapist.   MEDICATIONS:  When prescribe, the risks, benefits, warnings,side effects and alternatives of medications discussed. Advised against long term use of narcotics.   PAIN CONTROL:  Cold, heat, OTC lidocaine patches and/or ointment as needed. Avoid direct skin contact with ice. Ice 15-20 minutes 3-4 times daily as needed. For SI joint pain, recommend ice bath in water about 50 degrees for 5 consecutive days, add ice slowly to help with adjustment and may cover with warm towel or robe to help with cold tolerance. If using lidocaine, do not apply heat in conjunction as this can cause a burn.   MEDICAL RECORDS reviewed from other provider(s) for past and current medical history pertinent to this visit..

## 2025-07-18 ENCOUNTER — TREATMENT (OUTPATIENT)
Dept: PHYSICAL THERAPY | Facility: CLINIC | Age: 75
End: 2025-07-18
Payer: MEDICARE

## 2025-07-18 DIAGNOSIS — M54.16 RADICULOPATHY, LUMBAR REGION: ICD-10-CM

## 2025-07-18 DIAGNOSIS — M54.41 ACUTE BILATERAL LOW BACK PAIN WITH BILATERAL SCIATICA: Primary | ICD-10-CM

## 2025-07-18 DIAGNOSIS — M54.42 ACUTE BILATERAL LOW BACK PAIN WITH BILATERAL SCIATICA: Primary | ICD-10-CM

## 2025-07-18 DIAGNOSIS — Z74.09 IMPAIRED FUNCTIONAL MOBILITY AND ACTIVITY TOLERANCE: ICD-10-CM

## 2025-07-18 DIAGNOSIS — R26.2 DIFFICULTY WALKING: ICD-10-CM

## 2025-07-18 PROCEDURE — 97110 THERAPEUTIC EXERCISES: CPT | Performed by: PHYSICAL THERAPIST

## 2025-07-18 PROCEDURE — 97140 MANUAL THERAPY 1/> REGIONS: CPT | Performed by: PHYSICAL THERAPIST

## 2025-07-18 NOTE — PROGRESS NOTES
Physical Therapy Daily Treatment Note    [facility]  [address] [place] [address]  [phone] (phone)  [phone] (fax)    Patient: Hellen Delgado  : 1950  Diagnosis/ICD-10 Code:  Acute bilateral low back pain with bilateral sciatica [M54.42, M54.41]  Referring practitioner: KLEVER Pozo  Date of Initial Visit: Type: THERAPY  Noted: 2025  Today's Date: 2025  Patient seen for 4 session    Visit Diagnoses:    ICD-10-CM ICD-9-CM   1. Acute bilateral low back pain with bilateral sciatica  M54.42 724.2    M54.41 724.3   2. Radiculopathy, lumbar region  M54.16 724.4   3. Impaired functional mobility and activity tolerance  Z74.09 V49.89   4. Difficulty walking  R26.2 719.7            Subjective I am seeing Yumiko on Tuesday so canceled my appt here. The pain is now mainly on the L side down to the knee    Objective     Therapeutic Exercise/Therapeutic Activity/NM re-ed/Education:  -ab brace 20x 5 sec hold, exhale with TA contraction   -hip add iso w/ball 20x 5 sec hold, exhale with TA contraction   -small lift bridge x20  -SKTC 3x20 sec  -piriformis stretch  -DKTC 3x20 sec  -hamstring 90/90 [id number]     Manual  Pt in L and R SL with pillow between knees. STM to the gluteals with smooth end of  hand tool with sacral and ITBand release (using end of tool), manual distraction to L/S area with hand placement on iliac crest, rotational stretch with hand placement on the hip and shoulder    Assessment/Plan  Pt continues with pain into the L LE that increases with standing and weight bearing. She is using her SC today  and winces in pain with transitional movements (sit to stand and supine to sit). Pt is seeing APRN with neurosurgery next week to see next steps (MRI, possible LESI).          Timed:    Manual Therapy:    23     mins  04061;  Therapeutic Exercise:    20     mins  70232;     Neuromuscular John:        mins  84460;     Therapeutic Activity:          mins  08174;     Gait Training:           mins  45513;     Ultrasound:          mins  14660;    Electrical Stimulation:         mins  73000 ( );  Iontophoresis         mins 04903;  Aquatic Therapy         mins 98523;  Group Therapy                   mins 15832  Self Care           min  64157  Self Pay                               min    Untimed:  Electrical Stimulation:         mins  71415 ( );  Traction:         mins  05991;   Dry Needling   (1-2 muscles)             mins 20560 (Self-pay)  Dry Needling (3-4 muscles)           mins 20561 (Self-pay)  Dry Needling Trial              mins DRYNDLTRIAL  (No Charge)    Timed Treatment:   43   mins   Total Treatment:     43   mins    Sierra Ortega PT, CDNT  Physical Therapist    [place] License:[id number]

## 2025-07-22 ENCOUNTER — OFFICE VISIT (OUTPATIENT)
Dept: ORTHOPEDIC SURGERY | Facility: CLINIC | Age: 75
End: 2025-07-22
Payer: MEDICARE

## 2025-07-22 VITALS — BODY MASS INDEX: 38.29 KG/M2 | WEIGHT: 229.8 LBS | HEIGHT: 65 IN | TEMPERATURE: 97.8 F

## 2025-07-22 DIAGNOSIS — R52 PAIN: ICD-10-CM

## 2025-07-22 DIAGNOSIS — M51.361 DEGENERATION OF INTERVERTEBRAL DISC OF LUMBAR REGION WITH LOWER EXTREMITY PAIN: Primary | ICD-10-CM

## 2025-07-22 PROCEDURE — 1159F MED LIST DOCD IN RCRD: CPT | Performed by: NURSE PRACTITIONER

## 2025-07-22 PROCEDURE — 1160F RVW MEDS BY RX/DR IN RCRD: CPT | Performed by: NURSE PRACTITIONER

## 2025-07-22 PROCEDURE — 99213 OFFICE O/P EST LOW 20 MIN: CPT | Performed by: NURSE PRACTITIONER

## 2025-07-22 RX ORDER — ASCORBIC ACID 250 MG
TABLET,CHEWABLE ORAL
COMMUNITY
Start: 2020-01-01

## 2025-07-30 ENCOUNTER — TREATMENT (OUTPATIENT)
Dept: PHYSICAL THERAPY | Facility: CLINIC | Age: 75
End: 2025-07-30
Payer: MEDICARE

## 2025-07-30 DIAGNOSIS — R26.2 DIFFICULTY WALKING: ICD-10-CM

## 2025-07-30 DIAGNOSIS — Z74.09 IMPAIRED FUNCTIONAL MOBILITY AND ACTIVITY TOLERANCE: ICD-10-CM

## 2025-07-30 DIAGNOSIS — M54.16 RADICULOPATHY, LUMBAR REGION: ICD-10-CM

## 2025-07-30 DIAGNOSIS — M54.42 ACUTE BILATERAL LOW BACK PAIN WITH BILATERAL SCIATICA: Primary | ICD-10-CM

## 2025-07-30 DIAGNOSIS — M54.41 ACUTE BILATERAL LOW BACK PAIN WITH BILATERAL SCIATICA: Primary | ICD-10-CM

## 2025-07-30 PROCEDURE — 97110 THERAPEUTIC EXERCISES: CPT | Performed by: PHYSICAL THERAPIST

## 2025-07-30 PROCEDURE — 97140 MANUAL THERAPY 1/> REGIONS: CPT | Performed by: PHYSICAL THERAPIST

## 2025-07-30 NOTE — PROGRESS NOTES
Physical Therapy Daily Treatment Note    [facility]  [address] [place] [address]  [phone] (phone)  [phone] (fax)    Patient: Hellen Delgado  : 1950  Diagnosis/ICD-10 Code:  Acute bilateral low back pain with bilateral sciatica [M54.42, M54.41]  Referring practitioner: KLEVER Pozo  Date of Initial Visit: Type: THERAPY  Noted: 2025  Today's Date: 2025  Patient seen for 5 session    Visit Diagnoses:    ICD-10-CM ICD-9-CM   1. Acute bilateral low back pain with bilateral sciatica  M54.42 724.2    M54.41 724.3   2. Radiculopathy, lumbar region  M54.16 724.4   3. Impaired functional mobility and activity tolerance  Z74.09 V49.89   4. Difficulty walking  R26.2 719.7            Subjective Pt is in a wait list for MRI. It is scheduled for Aug 16th. Pain has been pretty bad the last few days. Pain 9/10 this am, in the L LB, buttock, and into L LE to the shin.     Objective     Therapeutic Exercise/Therapeutic Activity/NM re-ed/Education:  -ab brace 20x 5 sec hold, exhale with TA contraction   -hip add iso w/ball 20x 5 sec hold, exhale with TA contraction   -small lift bridge x20  DEFER  -SKTC 3x20 sec  -piriformis stretch       Manual  Pt in L and R SL with pillow between knees. STM to the gluteals with smooth end of  hand tool with sacral and ITBand release (using end of tool), manual distraction to L/S area with hand placement on iliac crest, rotational stretch with hand placement on the hip and shoulder    XR SPINE LUMBAR 2 OR 3 VW 25     FINDINGS: There is bony fusion at the L4-L5 level. Dextroscoliotic  curvature convex to the right at L2-L3. Disc space narrowing at L1-L2  where there is 3 mm retrolisthesis of L1 with respect to L2. There is  also disc space narrowing at L5-S1. Mild endplate spur formation. Facet arthritis mid to lower lumbar spine. No evidence for fracture or acute abnormality. Cholecystectomy clips  are present.     IMPRESSION:  1. Dextroscoliotic curvature of the lumbar spine.   2. Facet arthritis.   3. Degenerative disc disease best demonstrated at L1-L2, and L5 S1.   4. There appears to be bony fusion at the L4-L5 level.    Assessment/Plan  Pt pain level over the last few days has been severe. Rated 9/10 this am with radiating pain into the L LE to anterior/lateral shin. Following some manual work with distraction, her pain was no longer stabbing and was rated 5-6/10. She is waiting on her MRI and will call scheduling to try and get it moved up         Timed:    Manual Therapy:    23     mins  37982;  Therapeutic Exercise:    20     mins  35326;     Neuromuscular John:        mins  78142;    Therapeutic Activity:          mins  65830;     Gait Training:           mins  86505;     Ultrasound:          mins  59241;    Electrical Stimulation:         mins  99970 ( );  Iontophoresis         mins 53074;  Aquatic Therapy         mins 68272;  Group Therapy                   mins 83932  Self Care           min  95369  Self Pay  PTSPMIN2              min (1-29 min)  Self Pay PTSPVT             min (30 min or more)    Untimed:  Electrical Stimulation:         mins  86705 ( );  Traction:         mins  43472;   Dry Needling   (1-2 muscles)             mins 20560 (Self-pay)  Dry Needling (3-4 muscles)           mins 20561 (Self-pay)  Dry Needling Trial              mins DRYNDLTRIAL  (No Charge)    Timed Treatment:   43   mins   Total Treatment:     43   mins    Sierra Ortega PT, CDNT  Physical Therapist    [place] License:[id number]

## 2025-08-08 ENCOUNTER — TREATMENT (OUTPATIENT)
Dept: PHYSICAL THERAPY | Facility: CLINIC | Age: 75
End: 2025-08-08
Payer: MEDICARE

## 2025-08-08 DIAGNOSIS — M54.16 RADICULOPATHY, LUMBAR REGION: ICD-10-CM

## 2025-08-08 DIAGNOSIS — R26.2 DIFFICULTY WALKING: ICD-10-CM

## 2025-08-08 DIAGNOSIS — M54.42 ACUTE BILATERAL LOW BACK PAIN WITH BILATERAL SCIATICA: Primary | ICD-10-CM

## 2025-08-08 DIAGNOSIS — M54.41 ACUTE BILATERAL LOW BACK PAIN WITH BILATERAL SCIATICA: Primary | ICD-10-CM

## 2025-08-08 DIAGNOSIS — Z74.09 IMPAIRED FUNCTIONAL MOBILITY AND ACTIVITY TOLERANCE: ICD-10-CM

## 2025-08-08 PROCEDURE — 97110 THERAPEUTIC EXERCISES: CPT | Performed by: PHYSICAL THERAPIST

## 2025-08-08 PROCEDURE — 97140 MANUAL THERAPY 1/> REGIONS: CPT | Performed by: PHYSICAL THERAPIST

## 2025-08-11 ENCOUNTER — HOSPITAL ENCOUNTER (OUTPATIENT)
Dept: MRI IMAGING | Facility: HOSPITAL | Age: 75
Discharge: HOME | End: 2025-08-11
Admitting: NURSE PRACTITIONER
Payer: MEDICARE

## 2025-08-11 ENCOUNTER — TREATMENT (OUTPATIENT)
Dept: PHYSICAL THERAPY | Facility: CLINIC | Age: 75
End: 2025-08-11
Payer: MEDICARE

## 2025-08-11 DIAGNOSIS — M51.361 DEGENERATION OF INTERVERTEBRAL DISC OF LUMBAR REGION WITH LOWER EXTREMITY PAIN: ICD-10-CM

## 2025-08-11 DIAGNOSIS — Z74.09 IMPAIRED FUNCTIONAL MOBILITY AND ACTIVITY TOLERANCE: ICD-10-CM

## 2025-08-11 DIAGNOSIS — M54.16 RADICULOPATHY, LUMBAR REGION: ICD-10-CM

## 2025-08-11 DIAGNOSIS — M54.41 ACUTE BILATERAL LOW BACK PAIN WITH BILATERAL SCIATICA: Primary | ICD-10-CM

## 2025-08-11 DIAGNOSIS — M54.42 ACUTE BILATERAL LOW BACK PAIN WITH BILATERAL SCIATICA: Primary | ICD-10-CM

## 2025-08-11 DIAGNOSIS — R26.2 DIFFICULTY WALKING: ICD-10-CM

## 2025-08-11 PROCEDURE — 25510000002 GADOBENATE DIMEGLUMINE 529 MG/ML SOLUTION: Performed by: NURSE PRACTITIONER

## 2025-08-11 PROCEDURE — 97140 MANUAL THERAPY 1/> REGIONS: CPT | Performed by: PHYSICAL THERAPIST

## 2025-08-11 PROCEDURE — 97110 THERAPEUTIC EXERCISES: CPT | Performed by: PHYSICAL THERAPIST

## 2025-08-11 PROCEDURE — 72158 MRI LUMBAR SPINE W/O & W/DYE: CPT

## 2025-08-11 PROCEDURE — 76014 MR SFTY IMPLT&/FB ASMT STF 1: CPT

## 2025-08-11 PROCEDURE — A9577 INJ MULTIHANCE: HCPCS | Performed by: NURSE PRACTITIONER

## 2025-08-11 RX ADMIN — GADOBENATE DIMEGLUMINE 20 ML: 529 INJECTION, SOLUTION INTRAVENOUS at 17:32

## 2025-08-12 ENCOUNTER — RESULTS FOLLOW-UP (OUTPATIENT)
Dept: ORTHOPEDIC SURGERY | Facility: CLINIC | Age: 75
End: 2025-08-12
Payer: MEDICARE

## 2025-08-12 DIAGNOSIS — M51.16 LUMBAR DISC HERNIATION WITH RADICULOPATHY: Primary | ICD-10-CM

## 2025-08-14 ENCOUNTER — TELEPHONE (OUTPATIENT)
Dept: PHYSICAL THERAPY | Facility: CLINIC | Age: 75
End: 2025-08-14

## 2025-08-14 ENCOUNTER — HOSPITAL ENCOUNTER (OUTPATIENT)
Facility: HOSPITAL | Age: 75
Discharge: HOME | End: 2025-08-14
Admitting: NURSE PRACTITIONER
Payer: MEDICARE

## 2025-08-14 ENCOUNTER — OFFICE VISIT (OUTPATIENT)
Dept: ORTHOPEDIC SURGERY | Facility: CLINIC | Age: 75
End: 2025-08-14
Payer: MEDICARE

## 2025-08-14 VITALS — BODY MASS INDEX: 38.19 KG/M2 | WEIGHT: 229.2 LBS | TEMPERATURE: 98 F | HEIGHT: 65 IN

## 2025-08-14 DIAGNOSIS — M25.562 CHRONIC PAIN OF LEFT KNEE: ICD-10-CM

## 2025-08-14 DIAGNOSIS — G89.29 CHRONIC PAIN OF LEFT KNEE: ICD-10-CM

## 2025-08-14 DIAGNOSIS — R52 PAIN: Primary | ICD-10-CM

## 2025-08-14 PROCEDURE — 73700 CT LOWER EXTREMITY W/O DYE: CPT

## 2025-08-14 RX ORDER — MELOXICAM 15 MG/1
7.5 TABLET ORAL DAILY PRN
Qty: 30 TABLET | Refills: 3 | Status: SHIPPED | OUTPATIENT
Start: 2025-08-14

## 2025-08-21 ENCOUNTER — TREATMENT (OUTPATIENT)
Dept: PHYSICAL THERAPY | Facility: CLINIC | Age: 75
End: 2025-08-21
Payer: MEDICARE

## 2025-08-21 DIAGNOSIS — R26.2 DIFFICULTY WALKING: ICD-10-CM

## 2025-08-21 DIAGNOSIS — M54.42 ACUTE BILATERAL LOW BACK PAIN WITH BILATERAL SCIATICA: Primary | ICD-10-CM

## 2025-08-21 DIAGNOSIS — M54.16 RADICULOPATHY, LUMBAR REGION: ICD-10-CM

## 2025-08-21 DIAGNOSIS — M54.41 ACUTE BILATERAL LOW BACK PAIN WITH BILATERAL SCIATICA: Primary | ICD-10-CM

## 2025-08-21 DIAGNOSIS — Z74.09 IMPAIRED FUNCTIONAL MOBILITY AND ACTIVITY TOLERANCE: ICD-10-CM

## 2025-08-28 ENCOUNTER — TREATMENT (OUTPATIENT)
Dept: PHYSICAL THERAPY | Facility: CLINIC | Age: 75
End: 2025-08-28
Payer: MEDICARE

## 2025-08-28 DIAGNOSIS — M54.16 RADICULOPATHY, LUMBAR REGION: ICD-10-CM

## 2025-08-28 DIAGNOSIS — Z74.09 IMPAIRED FUNCTIONAL MOBILITY AND ACTIVITY TOLERANCE: ICD-10-CM

## 2025-08-28 DIAGNOSIS — M54.42 ACUTE BILATERAL LOW BACK PAIN WITH BILATERAL SCIATICA: Primary | ICD-10-CM

## 2025-08-28 DIAGNOSIS — M54.41 ACUTE BILATERAL LOW BACK PAIN WITH BILATERAL SCIATICA: Primary | ICD-10-CM

## 2025-08-28 DIAGNOSIS — R26.2 DIFFICULTY WALKING: ICD-10-CM

## 2025-08-28 PROCEDURE — 97110 THERAPEUTIC EXERCISES: CPT | Performed by: PHYSICAL THERAPIST

## 2025-08-28 PROCEDURE — 97140 MANUAL THERAPY 1/> REGIONS: CPT | Performed by: PHYSICAL THERAPIST

## (undated) DEVICE — GLV SURG SENSICARE W/ALOE PF LF 8 STRL

## (undated) DEVICE — SOL NACL 0.9PCT 100ML SGL

## (undated) DEVICE — STERILE PATIENT PROTECTIVE PAD FOR IMP® KNEE POSITIONERS & COHESIVE WRAP (10 / CASE): Brand: DE MAYO KNEE POSITIONER®

## (undated) DEVICE — BNDG ELAS ELITE V/CLOSE 6IN 5YD LF STRL

## (undated) DEVICE — UNDERCAST PADDING: Brand: DEROYAL

## (undated) DEVICE — NDL SPINE 22G 31/2IN BLK

## (undated) DEVICE — GLV SURG SENSICARE W/ALOE PF LF 7.5 STRL

## (undated) DEVICE — APPL DURAPREP IODOPHOR APL 26ML

## (undated) DEVICE — GLV SURG SENSICARE MICRO PF LF 7 STRL

## (undated) DEVICE — PK KN TOTL 40

## (undated) DEVICE — GLV SURG SENSICARE PI PF LF 7 GRN STRL

## (undated) DEVICE — 3M™ IOBAN™ 2 ANTIMICROBIAL INCISE DRAPE 6640EZ: Brand: IOBAN™ 2

## (undated) DEVICE — MEDI-VAC YANKAUER SUCTION HANDLE W/BULBOUS TIP: Brand: CARDINAL HEALTH

## (undated) DEVICE — PREMIUM WET SKIN PREP TRAY: Brand: MEDLINE INDUSTRIES, INC.

## (undated) DEVICE — SUT VIC 1 CT1 36IN J947H

## (undated) DEVICE — KT DRN EVAC WND PVC PCH WTROC RND 10F400

## (undated) DEVICE — MAT FLR ABSORBENT LG 4FT 10 2.5FT

## (undated) DEVICE — DUAL CUT SAGITTAL BLADE

## (undated) DEVICE — SUT VIC 0 CT1 36IN J946H

## (undated) DEVICE — ZIPPERED TOGA, 2X LARGE: Brand: FLYTE, SURGICOOL

## (undated) DEVICE — PREP SOL POVIDONE/IODINE BT 4OZ

## (undated) DEVICE — GLV SURG PREMIERPRO ORTHO LTX PF SZ7.5 BRN

## (undated) DEVICE — STPLR SKIN VISISTAT WD 35CT